# Patient Record
Sex: MALE | Race: WHITE | Employment: OTHER | URBAN - METROPOLITAN AREA
[De-identification: names, ages, dates, MRNs, and addresses within clinical notes are randomized per-mention and may not be internally consistent; named-entity substitution may affect disease eponyms.]

---

## 2017-04-07 ENCOUNTER — TELEPHONE (OUTPATIENT)
Dept: CARDIOLOGY CLINIC | Age: 82
End: 2017-04-07

## 2017-08-25 ENCOUNTER — HOSPITAL ENCOUNTER (OUTPATIENT)
Dept: MRI IMAGING | Age: 82
Discharge: HOME | End: 2017-08-25
Attending: FAMILY MEDICINE | Admitting: FAMILY MEDICINE

## 2017-08-25 DIAGNOSIS — R41.3 MEMORY LOSS: ICD-10-CM

## 2017-08-25 LAB
CREAT SERPL-MCNC: 1 MG/DL (ref 0.8–1.3)
GFR AFRICAN AMERICAN: >60
GFR NON-AFRICAN AMERICAN: >60

## 2017-09-19 ENCOUNTER — HOSPITAL ENCOUNTER (OUTPATIENT)
Dept: MRI IMAGING | Age: 82
Discharge: HOME | End: 2017-09-19
Attending: FAMILY MEDICINE | Admitting: FAMILY MEDICINE

## 2017-09-19 DIAGNOSIS — R41.3 OTHER AMNESIA: ICD-10-CM

## 2017-09-19 DIAGNOSIS — R41.3 MEMORY LOSS: ICD-10-CM

## 2017-09-22 ENCOUNTER — OFFICE VISIT (OUTPATIENT)
Dept: NEUROLOGY | Age: 82
End: 2017-09-22

## 2017-09-22 VITALS
DIASTOLIC BLOOD PRESSURE: 78 MMHG | WEIGHT: 234 LBS | HEIGHT: 74 IN | BODY MASS INDEX: 30.03 KG/M2 | HEART RATE: 58 BPM | SYSTOLIC BLOOD PRESSURE: 124 MMHG

## 2017-09-22 DIAGNOSIS — G30.1 LATE ONSET ALZHEIMER'S DISEASE WITHOUT BEHAVIORAL DISTURBANCE (HCC): Primary | ICD-10-CM

## 2017-09-22 DIAGNOSIS — F02.80 LATE ONSET ALZHEIMER'S DISEASE WITHOUT BEHAVIORAL DISTURBANCE (HCC): Primary | ICD-10-CM

## 2017-09-22 PROCEDURE — 99205 OFFICE O/P NEW HI 60 MIN: CPT | Performed by: PSYCHIATRY & NEUROLOGY

## 2017-09-22 RX ORDER — MEMANTINE HYDROCHLORIDE 5 MG/1
TABLET ORAL
Qty: 60 TABLET | Refills: 1 | Status: SHIPPED | OUTPATIENT
Start: 2017-09-22 | End: 2021-06-17

## 2018-03-18 PROBLEM — N17.9 ACUTE RENAL FAILURE (ARF) (HCC): Status: ACTIVE | Noted: 2018-03-18

## 2018-04-04 ENCOUNTER — HOSPITAL ENCOUNTER (OUTPATIENT)
Dept: SURGERY | Age: 83
Discharge: HOME | End: 2018-04-04
Attending: FAMILY MEDICINE | Admitting: FAMILY MEDICINE

## 2018-04-04 VITALS
RESPIRATION RATE: 20 BRPM | HEART RATE: 66 BPM | WEIGHT: 233.25 LBS | TEMPERATURE: 97.4 F | BODY MASS INDEX: 29.95 KG/M2 | OXYGEN SATURATION: 94 % | SYSTOLIC BLOOD PRESSURE: 152 MMHG | DIASTOLIC BLOOD PRESSURE: 85 MMHG

## 2018-04-04 LAB
ANION GAP SERPL CALCULATED.3IONS-SCNC: 10 MMOL/L (ref 3–16)
BUN BLDV-MCNC: 20 MG/DL (ref 7–20)
CALCIUM SERPL-MCNC: 9.4 MG/DL (ref 8.3–10.6)
CHLORIDE BLD-SCNC: 105 MMOL/L (ref 99–110)
CO2: 27 MMOL/L (ref 21–32)
CREAT SERPL-MCNC: 1.1 MG/DL (ref 0.8–1.3)
GFR AFRICAN AMERICAN: >60
GFR NON-AFRICAN AMERICAN: >60
GLUCOSE BLD-MCNC: 111 MG/DL (ref 70–99)
POTASSIUM SERPL-SCNC: 3.6 MMOL/L (ref 3.5–5.1)
SODIUM BLD-SCNC: 142 MMOL/L (ref 136–145)

## 2018-04-04 PROCEDURE — 93010 ELECTROCARDIOGRAM REPORT: CPT | Performed by: INTERNAL MEDICINE

## 2018-04-04 RX ORDER — DIPHENHYDRAMINE HYDROCHLORIDE 50 MG/ML
12.5 INJECTION INTRAMUSCULAR; INTRAVENOUS
Status: ACTIVE | OUTPATIENT
Start: 2018-04-04 | End: 2018-04-04

## 2018-04-04 RX ORDER — SODIUM CHLORIDE 0.9 % (FLUSH) 0.9 %
10 SYRINGE (ML) INJECTION EVERY 12 HOURS SCHEDULED
Status: CANCELLED | OUTPATIENT
Start: 2018-04-04

## 2018-04-04 RX ORDER — LIDOCAINE HYDROCHLORIDE 10 MG/ML
0.5 INJECTION, SOLUTION EPIDURAL; INFILTRATION; INTRACAUDAL; PERINEURAL ONCE
Status: DISCONTINUED | OUTPATIENT
Start: 2018-04-04 | End: 2018-04-05 | Stop reason: HOSPADM

## 2018-04-04 RX ORDER — SODIUM CHLORIDE 9 MG/ML
INJECTION, SOLUTION INTRAVENOUS CONTINUOUS
Status: CANCELLED | OUTPATIENT
Start: 2018-04-04

## 2018-04-04 RX ORDER — FENTANYL CITRATE 50 UG/ML
25 INJECTION, SOLUTION INTRAMUSCULAR; INTRAVENOUS EVERY 5 MIN PRN
Status: DISCONTINUED | OUTPATIENT
Start: 2018-04-04 | End: 2018-04-05 | Stop reason: HOSPADM

## 2018-04-04 RX ORDER — SODIUM CHLORIDE 0.9 % (FLUSH) 0.9 %
10 SYRINGE (ML) INJECTION PRN
Status: CANCELLED | OUTPATIENT
Start: 2018-04-04

## 2018-04-04 RX ORDER — CEFAZOLIN SODIUM 2 G/100ML
2 INJECTION, SOLUTION INTRAVENOUS ONCE
Status: COMPLETED | OUTPATIENT
Start: 2018-04-04 | End: 2018-04-04

## 2018-04-04 RX ORDER — LIDOCAINE HYDROCHLORIDE 10 MG/ML
1 INJECTION, SOLUTION EPIDURAL; INFILTRATION; INTRACAUDAL; PERINEURAL
Status: CANCELLED | OUTPATIENT
Start: 2018-04-04 | End: 2018-04-04

## 2018-04-04 RX ORDER — FENTANYL CITRATE 50 UG/ML
50 INJECTION, SOLUTION INTRAMUSCULAR; INTRAVENOUS EVERY 5 MIN PRN
Status: DISCONTINUED | OUTPATIENT
Start: 2018-04-04 | End: 2018-04-05 | Stop reason: HOSPADM

## 2018-04-04 RX ORDER — SODIUM CHLORIDE, SODIUM LACTATE, POTASSIUM CHLORIDE, CALCIUM CHLORIDE 600; 310; 30; 20 MG/100ML; MG/100ML; MG/100ML; MG/100ML
INJECTION, SOLUTION INTRAVENOUS CONTINUOUS
Status: DISCONTINUED | OUTPATIENT
Start: 2018-04-04 | End: 2018-04-05 | Stop reason: HOSPADM

## 2018-04-04 RX ORDER — HYDROMORPHONE HCL 110MG/55ML
0.25 PATIENT CONTROLLED ANALGESIA SYRINGE INTRAVENOUS EVERY 5 MIN PRN
Status: DISCONTINUED | OUTPATIENT
Start: 2018-04-04 | End: 2018-04-05 | Stop reason: HOSPADM

## 2018-04-04 RX ORDER — HYDROMORPHONE HCL 110MG/55ML
0.5 PATIENT CONTROLLED ANALGESIA SYRINGE INTRAVENOUS EVERY 5 MIN PRN
Status: DISCONTINUED | OUTPATIENT
Start: 2018-04-04 | End: 2018-04-05 | Stop reason: HOSPADM

## 2018-04-04 RX ORDER — MEPERIDINE HYDROCHLORIDE 25 MG/ML
12.5 INJECTION INTRAMUSCULAR; INTRAVENOUS; SUBCUTANEOUS EVERY 5 MIN PRN
Status: DISCONTINUED | OUTPATIENT
Start: 2018-04-04 | End: 2018-04-05 | Stop reason: HOSPADM

## 2018-04-04 RX ORDER — HYDROCODONE BITARTRATE AND ACETAMINOPHEN 5; 325 MG/1; MG/1
2 TABLET ORAL PRN
Status: ACTIVE | OUTPATIENT
Start: 2018-04-04 | End: 2018-04-04

## 2018-04-04 RX ORDER — PROMETHAZINE HYDROCHLORIDE 25 MG/ML
6.25 INJECTION, SOLUTION INTRAMUSCULAR; INTRAVENOUS EVERY 30 MIN PRN
Status: DISCONTINUED | OUTPATIENT
Start: 2018-04-04 | End: 2018-04-05 | Stop reason: HOSPADM

## 2018-04-04 RX ORDER — HYDROCODONE BITARTRATE AND ACETAMINOPHEN 5; 325 MG/1; MG/1
1 TABLET ORAL PRN
Status: ACTIVE | OUTPATIENT
Start: 2018-04-04 | End: 2018-04-04

## 2018-04-04 RX ADMIN — CEFAZOLIN SODIUM 2 G: 2 INJECTION, SOLUTION INTRAVENOUS at 08:09

## 2018-04-04 RX ADMIN — SODIUM CHLORIDE, SODIUM LACTATE, POTASSIUM CHLORIDE, CALCIUM CHLORIDE: 600; 310; 30; 20 INJECTION, SOLUTION INTRAVENOUS at 07:57

## 2018-04-04 ASSESSMENT — PAIN SCALES - GENERAL
PAINLEVEL_OUTOF10: 0
PAINLEVEL_OUTOF10: 0

## 2018-04-04 ASSESSMENT — PAIN - FUNCTIONAL ASSESSMENT: PAIN_FUNCTIONAL_ASSESSMENT: 0-10

## 2018-04-05 LAB
EKG ATRIAL RATE: 66 BPM
EKG DIAGNOSIS: NORMAL
EKG P AXIS: 33 DEGREES
EKG P-R INTERVAL: 140 MS
EKG Q-T INTERVAL: 440 MS
EKG QRS DURATION: 80 MS
EKG QTC CALCULATION (BAZETT): 461 MS
EKG R AXIS: -15 DEGREES
EKG T AXIS: 270 DEGREES
EKG VENTRICULAR RATE: 66 BPM

## 2018-04-26 PROBLEM — N40.1 BENIGN PROSTATIC HYPERPLASIA (BPH) WITH POST-VOID DRIBBLING: Status: ACTIVE | Noted: 2018-04-26

## 2018-04-26 PROBLEM — N39.43 BENIGN PROSTATIC HYPERPLASIA (BPH) WITH POST-VOID DRIBBLING: Status: ACTIVE | Noted: 2018-04-26

## 2019-09-24 ENCOUNTER — HOSPITAL ENCOUNTER (EMERGENCY)
Age: 84
Discharge: HOME | End: 2019-09-24
Attending: EMERGENCY MEDICINE
Payer: MEDICARE

## 2019-09-24 ENCOUNTER — APPOINTMENT (OUTPATIENT)
Dept: GENERAL RADIOLOGY | Age: 84
End: 2019-09-24
Payer: MEDICARE

## 2019-09-24 VITALS
WEIGHT: 230 LBS | HEART RATE: 61 BPM | HEIGHT: 74 IN | TEMPERATURE: 97.4 F | OXYGEN SATURATION: 95 % | SYSTOLIC BLOOD PRESSURE: 154 MMHG | RESPIRATION RATE: 20 BRPM | DIASTOLIC BLOOD PRESSURE: 67 MMHG | BODY MASS INDEX: 29.52 KG/M2

## 2019-09-24 DIAGNOSIS — I95.9 HYPOTENSION, UNSPECIFIED HYPOTENSION TYPE: ICD-10-CM

## 2019-09-24 DIAGNOSIS — E86.0 DEHYDRATION: Primary | ICD-10-CM

## 2019-09-24 LAB
ALBUMIN SERPL-MCNC: 4 G/DL (ref 3.4–5)
ALP BLD-CCNC: 105 U/L (ref 40–129)
ALT SERPL-CCNC: 11 U/L (ref 10–40)
ANION GAP SERPL CALCULATED.3IONS-SCNC: 9 MMOL/L (ref 3–16)
AST SERPL-CCNC: 17 U/L (ref 15–37)
BASOPHILS ABSOLUTE: 0.1 K/UL (ref 0–0.2)
BASOPHILS RELATIVE PERCENT: 1.1 %
BILIRUB SERPL-MCNC: 0.6 MG/DL (ref 0–1)
BILIRUBIN DIRECT: <0.2 MG/DL (ref 0–0.3)
BILIRUBIN URINE: ABNORMAL
BILIRUBIN, INDIRECT: NORMAL MG/DL (ref 0–1)
BLOOD, URINE: NEGATIVE
BUN BLDV-MCNC: 17 MG/DL (ref 7–20)
CALCIUM SERPL-MCNC: 9.3 MG/DL (ref 8.3–10.6)
CHLORIDE BLD-SCNC: 108 MMOL/L (ref 99–110)
CLARITY: CLEAR
CO2: 25 MMOL/L (ref 21–32)
COLOR: YELLOW
CREAT SERPL-MCNC: 1.5 MG/DL (ref 0.8–1.3)
EOSINOPHILS ABSOLUTE: 0.4 K/UL (ref 0–0.6)
EOSINOPHILS RELATIVE PERCENT: 4.2 %
EPITHELIAL CELLS, UA: 1 /HPF (ref 0–5)
GFR AFRICAN AMERICAN: 54
GFR NON-AFRICAN AMERICAN: 45
GLUCOSE BLD-MCNC: 132 MG/DL (ref 70–99)
GLUCOSE URINE: NEGATIVE MG/DL
HCT VFR BLD CALC: 46.3 % (ref 40.5–52.5)
HEMOGLOBIN: 15.2 G/DL (ref 13.5–17.5)
HYALINE CASTS: 12 /LPF (ref 0–8)
KETONES, URINE: NEGATIVE MG/DL
LACTIC ACID: 1.2 MMOL/L (ref 0.4–2)
LEUKOCYTE ESTERASE, URINE: ABNORMAL
LYMPHOCYTES ABSOLUTE: 2.4 K/UL (ref 1–5.1)
LYMPHOCYTES RELATIVE PERCENT: 26.2 %
MCH RBC QN AUTO: 29.9 PG (ref 26–34)
MCHC RBC AUTO-ENTMCNC: 32.7 G/DL (ref 31–36)
MCV RBC AUTO: 91.4 FL (ref 80–100)
MICROSCOPIC EXAMINATION: YES
MONOCYTES ABSOLUTE: 0.9 K/UL (ref 0–1.3)
MONOCYTES RELATIVE PERCENT: 9.4 %
NEUTROPHILS ABSOLUTE: 5.4 K/UL (ref 1.7–7.7)
NEUTROPHILS RELATIVE PERCENT: 59.1 %
NITRITE, URINE: NEGATIVE
PDW BLD-RTO: 14.5 % (ref 12.4–15.4)
PH UA: 5.5 (ref 5–8)
PLATELET # BLD: 262 K/UL (ref 135–450)
PMV BLD AUTO: 8.7 FL (ref 5–10.5)
POTASSIUM SERPL-SCNC: 3.6 MMOL/L (ref 3.5–5.1)
PROTEIN UA: 30 MG/DL
RBC # BLD: 5.07 M/UL (ref 4.2–5.9)
RBC UA: 2 /HPF (ref 0–4)
SODIUM BLD-SCNC: 142 MMOL/L (ref 136–145)
SPECIFIC GRAVITY UA: 1.02 (ref 1–1.03)
TOTAL PROTEIN: 7 G/DL (ref 6.4–8.2)
TROPONIN: <0.01 NG/ML
URINE REFLEX TO CULTURE: YES
URINE TYPE: ABNORMAL
UROBILINOGEN, URINE: 0.2 E.U./DL
WBC # BLD: 9.1 K/UL (ref 4–11)
WBC UA: 6 /HPF (ref 0–5)

## 2019-09-24 PROCEDURE — 2580000003 HC RX 258: Performed by: EMERGENCY MEDICINE

## 2019-09-24 PROCEDURE — 87086 URINE CULTURE/COLONY COUNT: CPT

## 2019-09-24 PROCEDURE — 85025 COMPLETE CBC W/AUTO DIFF WBC: CPT

## 2019-09-24 PROCEDURE — 93005 ELECTROCARDIOGRAM TRACING: CPT | Performed by: EMERGENCY MEDICINE

## 2019-09-24 PROCEDURE — 96366 THER/PROPH/DIAG IV INF ADDON: CPT

## 2019-09-24 PROCEDURE — 96365 THER/PROPH/DIAG IV INF INIT: CPT

## 2019-09-24 PROCEDURE — 99285 EMERGENCY DEPT VISIT HI MDM: CPT

## 2019-09-24 PROCEDURE — 81001 URINALYSIS AUTO W/SCOPE: CPT

## 2019-09-24 PROCEDURE — 80076 HEPATIC FUNCTION PANEL: CPT

## 2019-09-24 PROCEDURE — 84484 ASSAY OF TROPONIN QUANT: CPT

## 2019-09-24 PROCEDURE — 83605 ASSAY OF LACTIC ACID: CPT

## 2019-09-24 PROCEDURE — 80048 BASIC METABOLIC PNL TOTAL CA: CPT

## 2019-09-24 PROCEDURE — 71046 X-RAY EXAM CHEST 2 VIEWS: CPT

## 2019-09-24 RX ORDER — OMEPRAZOLE 20 MG/1
20 CAPSULE, DELAYED RELEASE ORAL DAILY
COMMUNITY
End: 2021-06-17

## 2019-09-24 RX ORDER — TEMAZEPAM 15 MG/1
15 CAPSULE ORAL NIGHTLY PRN
COMMUNITY
End: 2021-06-17

## 2019-09-24 RX ORDER — SODIUM CHLORIDE, SODIUM LACTATE, POTASSIUM CHLORIDE, CALCIUM CHLORIDE 600; 310; 30; 20 MG/100ML; MG/100ML; MG/100ML; MG/100ML
1000 INJECTION, SOLUTION INTRAVENOUS ONCE
Status: COMPLETED | OUTPATIENT
Start: 2019-09-24 | End: 2019-09-24

## 2019-09-24 RX ORDER — FINASTERIDE 5 MG/1
5 TABLET, FILM COATED ORAL DAILY
COMMUNITY
End: 2021-06-17

## 2019-09-24 RX ADMIN — SODIUM CHLORIDE, POTASSIUM CHLORIDE, SODIUM LACTATE AND CALCIUM CHLORIDE 1000 ML: 600; 310; 30; 20 INJECTION, SOLUTION INTRAVENOUS at 17:44

## 2019-09-25 LAB
EKG ATRIAL RATE: 82 BPM
EKG DIAGNOSIS: NORMAL
EKG P AXIS: 14 DEGREES
EKG P-R INTERVAL: 128 MS
EKG Q-T INTERVAL: 404 MS
EKG QRS DURATION: 68 MS
EKG QTC CALCULATION (BAZETT): 472 MS
EKG R AXIS: -23 DEGREES
EKG T AXIS: -6 DEGREES
EKG VENTRICULAR RATE: 82 BPM

## 2019-09-25 PROCEDURE — 93010 ELECTROCARDIOGRAM REPORT: CPT | Performed by: INTERNAL MEDICINE

## 2019-09-26 LAB — URINE CULTURE, ROUTINE: NORMAL

## 2020-02-25 ENCOUNTER — OFFICE VISIT (OUTPATIENT)
Dept: ORTHOPEDIC SURGERY | Age: 85
End: 2020-02-25
Payer: MEDICARE

## 2020-02-25 VITALS
HEIGHT: 74 IN | HEART RATE: 81 BPM | WEIGHT: 230 LBS | BODY MASS INDEX: 29.52 KG/M2 | SYSTOLIC BLOOD PRESSURE: 103 MMHG | DIASTOLIC BLOOD PRESSURE: 66 MMHG

## 2020-02-25 PROCEDURE — G8427 DOCREV CUR MEDS BY ELIG CLIN: HCPCS | Performed by: ORTHOPAEDIC SURGERY

## 2020-02-25 PROCEDURE — G8484 FLU IMMUNIZE NO ADMIN: HCPCS | Performed by: ORTHOPAEDIC SURGERY

## 2020-02-25 PROCEDURE — 1123F ACP DISCUSS/DSCN MKR DOCD: CPT | Performed by: ORTHOPAEDIC SURGERY

## 2020-02-25 PROCEDURE — 99203 OFFICE O/P NEW LOW 30 MIN: CPT | Performed by: ORTHOPAEDIC SURGERY

## 2020-02-25 PROCEDURE — G8417 CALC BMI ABV UP PARAM F/U: HCPCS | Performed by: ORTHOPAEDIC SURGERY

## 2020-02-25 PROCEDURE — 1036F TOBACCO NON-USER: CPT | Performed by: ORTHOPAEDIC SURGERY

## 2020-02-25 PROCEDURE — 20610 DRAIN/INJ JOINT/BURSA W/O US: CPT | Performed by: ORTHOPAEDIC SURGERY

## 2020-02-25 PROCEDURE — 4040F PNEUMOC VAC/ADMIN/RCVD: CPT | Performed by: ORTHOPAEDIC SURGERY

## 2020-02-25 RX ORDER — LIDOCAINE HYDROCHLORIDE 10 MG/ML
8 INJECTION, SOLUTION INFILTRATION; PERINEURAL ONCE
Status: COMPLETED | OUTPATIENT
Start: 2020-02-25 | End: 2020-02-25

## 2020-02-25 RX ORDER — METHYLPREDNISOLONE ACETATE 40 MG/ML
80 INJECTION, SUSPENSION INTRA-ARTICULAR; INTRALESIONAL; INTRAMUSCULAR; SOFT TISSUE ONCE
Status: COMPLETED | OUTPATIENT
Start: 2020-02-25 | End: 2020-02-25

## 2020-02-25 RX ADMIN — METHYLPREDNISOLONE ACETATE 80 MG: 40 INJECTION, SUSPENSION INTRA-ARTICULAR; INTRALESIONAL; INTRAMUSCULAR; SOFT TISSUE at 14:41

## 2020-02-25 RX ADMIN — LIDOCAINE HYDROCHLORIDE 8 ML: 10 INJECTION, SOLUTION INFILTRATION; PERINEURAL at 14:41

## 2020-02-25 NOTE — PROGRESS NOTES
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 According to his daughter who is present, he has had multiple falls during that time, the last one approximately 4 weeks ago. However, does not remember one specific fall which caused shoulder pain. In addition he played both high school football, as well as  College football for [employer] and [employer] but again had no specific injury at that time. His pain is primarily in the left proximal humerus radiating to the left elbow. It is an intermittent ache with no pain at rest but pain up to 5 especially when reaching overhead. There is no associated numbness. He has had no previous treatment for the left shoulder. Pertinent items are noted in HPI  Review of systems reviewed from Patient History Form dated on 2/25/2020 and available in the patient's chart under the Media tab. The patient's past medical history, medications, allergies, family history, social history, HPI have been reviewed, dated, and recorded in the chart. Dr. Bob Childress were primarily in the [facility] region retiring when he was about 80years of age.     /66   Pulse 81   Ht 6' 2\" (1.88 m)   Wt 230 lb (104.3 kg)   BMI 29.53 kg/m²     PHYSICAL EXAMINATION    General Appearance: no acute distress, alert, oriented x 3, appropriate mood and affect    Atrophy: No  Ecchymosis: No   Errythemia: No  Swelling: No   Deformity: No  Scapular Winging: No  Palpation/Tenderness: no  Crepitus:   Neurovascular Status: intact    Pulses (0-4)   Radial    Ulnar   Right     Left 2+      Range of Motion: Degrees   Abduction External Internal Passive Abd   Right 140 80 60 Left 115 40 10       SpecialTest:   Impingement Ridge Fish Crossover Sign Speed Juliatarun Subacromial  Injection test SLAP T    DLST   Right    neg      Left    neg        Strength Rotator Cuff:           Normal=N    Weak=W     Antalgic=A    Supraspinatus  Subscapularis  Infraspinatus  Teres Minor    Right N N N N    Left Mild W N Mild W N       X-Ray Findings taken in Office: 3 views left shoulder read by myself show marked loss of left shoulder glenohumeral space with a large osteophyte off the inferior aspect of the humeral head. Humeral acromial space is normal at approximately 12 mm. There are cystic changes at the greater tuberosity. He has moderate arthritic changes at the acromioclavicular joint. No evidence of fracture or lytic disease. No evidence of dislocation. Impression:   1. Severe glenohumeral osteoarthritis of left shoulder. 2.  Probable tear of both supraspinatus and infraspinatus tendon. (Rotator cuff arthropathy)    Plan/Treatment:   1. Injection with cortisone was recommended into  Left shoulder. After discussing potential risks and benefits, the injection site was cleaned with alcohol, and 2 cc of 40 mg Depo Medrol combined with 8 cc of 1% Lidoocaine were injected into the subdeltoid bursa. 2.  He will start in-home physical therapy for strengthening and range of motion of his left shoulder. 3.  I did discuss prognosis. Because of his arthritis he would likely not regain his full motion. 4.  Further treatment and diagnostic options were discussed with the patient. If he is not satisfied with his progress he would consider returning to the office for a diagnostic ultrasound of the shoulder. Katelynn Portillo MD  2/25/2020    This dictation was done with Dragon dictation and may contain mechanical errors related to translation.

## 2021-06-17 ENCOUNTER — APPOINTMENT (OUTPATIENT)
Dept: GENERAL RADIOLOGY | Age: 86
End: 2021-06-17
Payer: MEDICARE

## 2021-06-17 ENCOUNTER — APPOINTMENT (OUTPATIENT)
Dept: MRI IMAGING | Age: 86
End: 2021-06-17
Payer: MEDICARE

## 2021-06-17 ENCOUNTER — HOSPITAL ENCOUNTER (OUTPATIENT)
Age: 86
Setting detail: OBSERVATION
Discharge: HOME | End: 2021-06-18
Attending: EMERGENCY MEDICINE | Admitting: EMERGENCY MEDICINE
Payer: MEDICARE

## 2021-06-17 ENCOUNTER — APPOINTMENT (OUTPATIENT)
Dept: CT IMAGING | Age: 86
End: 2021-06-17
Payer: MEDICARE

## 2021-06-17 DIAGNOSIS — R55 NEAR SYNCOPE: Primary | ICD-10-CM

## 2021-06-17 DIAGNOSIS — I10 ESSENTIAL HYPERTENSION: ICD-10-CM

## 2021-06-17 LAB
A/G RATIO: 1.2 (ref 1.1–2.2)
ALBUMIN SERPL-MCNC: 3.8 G/DL (ref 3.4–5)
ALP BLD-CCNC: 90 U/L (ref 40–129)
ALT SERPL-CCNC: 7 U/L (ref 10–40)
ANION GAP SERPL CALCULATED.3IONS-SCNC: 9 MMOL/L (ref 3–16)
AST SERPL-CCNC: 18 U/L (ref 15–37)
BASOPHILS ABSOLUTE: 0.1 K/UL (ref 0–0.2)
BASOPHILS RELATIVE PERCENT: 0.6 %
BILIRUB SERPL-MCNC: 0.4 MG/DL (ref 0–1)
BILIRUBIN URINE: NEGATIVE
BLOOD, URINE: NEGATIVE
BUN BLDV-MCNC: 19 MG/DL (ref 7–20)
CALCIUM SERPL-MCNC: 9.2 MG/DL (ref 8.3–10.6)
CHLORIDE BLD-SCNC: 104 MMOL/L (ref 99–110)
CLARITY: ABNORMAL
CO2: 26 MMOL/L (ref 21–32)
COLOR: YELLOW
CREAT SERPL-MCNC: 1.2 MG/DL (ref 0.8–1.3)
CRYSTALS, UA: ABNORMAL /HPF
EKG ATRIAL RATE: 82 BPM
EKG DIAGNOSIS: NORMAL
EKG P AXIS: 25 DEGREES
EKG P-R INTERVAL: 134 MS
EKG Q-T INTERVAL: 410 MS
EKG QRS DURATION: 68 MS
EKG QTC CALCULATION (BAZETT): 479 MS
EKG R AXIS: -23 DEGREES
EKG T AXIS: -17 DEGREES
EKG VENTRICULAR RATE: 82 BPM
EOSINOPHILS ABSOLUTE: 0.6 K/UL (ref 0–0.6)
EOSINOPHILS RELATIVE PERCENT: 6 %
EPITHELIAL CELLS, UA: 1 /HPF (ref 0–5)
GFR AFRICAN AMERICAN: >60
GFR NON-AFRICAN AMERICAN: 58
GLOBULIN: 3.1 G/DL
GLUCOSE BLD-MCNC: 118 MG/DL (ref 70–99)
GLUCOSE URINE: NEGATIVE MG/DL
HCT VFR BLD CALC: 44.8 % (ref 40.5–52.5)
HEMOGLOBIN: 15.1 G/DL (ref 13.5–17.5)
HYALINE CASTS: 1 /LPF (ref 0–8)
KETONES, URINE: NEGATIVE MG/DL
LEUKOCYTE ESTERASE, URINE: NEGATIVE
LYMPHOCYTES ABSOLUTE: 2.3 K/UL (ref 1–5.1)
LYMPHOCYTES RELATIVE PERCENT: 23.6 %
MCH RBC QN AUTO: 29.3 PG (ref 26–34)
MCHC RBC AUTO-ENTMCNC: 33.6 G/DL (ref 31–36)
MCV RBC AUTO: 87.3 FL (ref 80–100)
MICROSCOPIC EXAMINATION: YES
MONOCYTES ABSOLUTE: 1.2 K/UL (ref 0–1.3)
MONOCYTES RELATIVE PERCENT: 11.9 %
NEUTROPHILS ABSOLUTE: 5.7 K/UL (ref 1.7–7.7)
NEUTROPHILS RELATIVE PERCENT: 57.9 %
NITRITE, URINE: NEGATIVE
PDW BLD-RTO: 14.1 % (ref 12.4–15.4)
PH UA: 6 (ref 5–8)
PLATELET # BLD: 274 K/UL (ref 135–450)
PMV BLD AUTO: 7.9 FL (ref 5–10.5)
POTASSIUM SERPL-SCNC: 3.6 MMOL/L (ref 3.5–5.1)
PRO-BNP: 1012 PG/ML (ref 0–449)
PROTEIN UA: NEGATIVE MG/DL
RBC # BLD: 5.13 M/UL (ref 4.2–5.9)
RBC UA: 1 /HPF (ref 0–4)
SODIUM BLD-SCNC: 139 MMOL/L (ref 136–145)
SPECIFIC GRAVITY UA: 1.02 (ref 1–1.03)
TOTAL PROTEIN: 6.9 G/DL (ref 6.4–8.2)
TROPONIN: <0.01 NG/ML
URINE REFLEX TO CULTURE: ABNORMAL
URINE TYPE: ABNORMAL
UROBILINOGEN, URINE: 1 E.U./DL
WBC # BLD: 9.8 K/UL (ref 4–11)
WBC UA: 2 /HPF (ref 0–5)

## 2021-06-17 PROCEDURE — 71045 X-RAY EXAM CHEST 1 VIEW: CPT

## 2021-06-17 PROCEDURE — G0378 HOSPITAL OBSERVATION PER HR: HCPCS

## 2021-06-17 PROCEDURE — 94760 N-INVAS EAR/PLS OXIMETRY 1: CPT

## 2021-06-17 PROCEDURE — 96372 THER/PROPH/DIAG INJ SC/IM: CPT

## 2021-06-17 PROCEDURE — 70450 CT HEAD/BRAIN W/O DYE: CPT

## 2021-06-17 PROCEDURE — 2580000003 HC RX 258: Performed by: PHYSICIAN ASSISTANT

## 2021-06-17 PROCEDURE — 6360000002 HC RX W HCPCS: Performed by: HOSPITALIST

## 2021-06-17 PROCEDURE — 93010 ELECTROCARDIOGRAM REPORT: CPT | Performed by: INTERNAL MEDICINE

## 2021-06-17 PROCEDURE — 93005 ELECTROCARDIOGRAM TRACING: CPT | Performed by: EMERGENCY MEDICINE

## 2021-06-17 PROCEDURE — 85025 COMPLETE CBC W/AUTO DIFF WBC: CPT

## 2021-06-17 PROCEDURE — 99284 EMERGENCY DEPT VISIT MOD MDM: CPT

## 2021-06-17 PROCEDURE — 36415 COLL VENOUS BLD VENIPUNCTURE: CPT

## 2021-06-17 PROCEDURE — 96361 HYDRATE IV INFUSION ADD-ON: CPT

## 2021-06-17 PROCEDURE — 6370000000 HC RX 637 (ALT 250 FOR IP): Performed by: HOSPITALIST

## 2021-06-17 PROCEDURE — 2580000003 HC RX 258: Performed by: HOSPITALIST

## 2021-06-17 PROCEDURE — 81001 URINALYSIS AUTO W/SCOPE: CPT

## 2021-06-17 PROCEDURE — 96360 HYDRATION IV INFUSION INIT: CPT

## 2021-06-17 PROCEDURE — 80053 COMPREHEN METABOLIC PANEL: CPT

## 2021-06-17 PROCEDURE — 84484 ASSAY OF TROPONIN QUANT: CPT

## 2021-06-17 PROCEDURE — 83880 ASSAY OF NATRIURETIC PEPTIDE: CPT

## 2021-06-17 RX ORDER — POLYETHYLENE GLYCOL 3350 17 G/17G
17 POWDER, FOR SOLUTION ORAL DAILY PRN
Status: DISCONTINUED | OUTPATIENT
Start: 2021-06-17 | End: 2021-06-18 | Stop reason: HOSPADM

## 2021-06-17 RX ORDER — SODIUM CHLORIDE 0.9 % (FLUSH) 0.9 %
5-40 SYRINGE (ML) INJECTION EVERY 12 HOURS SCHEDULED
Status: DISCONTINUED | OUTPATIENT
Start: 2021-06-17 | End: 2021-06-18 | Stop reason: HOSPADM

## 2021-06-17 RX ORDER — ONDANSETRON 4 MG/1
4 TABLET, ORALLY DISINTEGRATING ORAL EVERY 8 HOURS PRN
Status: DISCONTINUED | OUTPATIENT
Start: 2021-06-17 | End: 2021-06-18 | Stop reason: HOSPADM

## 2021-06-17 RX ORDER — HYDRALAZINE HYDROCHLORIDE 20 MG/ML
10 INJECTION INTRAMUSCULAR; INTRAVENOUS EVERY 6 HOURS PRN
Status: DISCONTINUED | OUTPATIENT
Start: 2021-06-17 | End: 2021-06-18 | Stop reason: HOSPADM

## 2021-06-17 RX ORDER — ATORVASTATIN CALCIUM 40 MG/1
40 TABLET, FILM COATED ORAL DAILY
Status: DISCONTINUED | OUTPATIENT
Start: 2021-06-17 | End: 2021-06-18 | Stop reason: HOSPADM

## 2021-06-17 RX ORDER — ACETAMINOPHEN 650 MG/1
650 SUPPOSITORY RECTAL EVERY 6 HOURS PRN
Status: DISCONTINUED | OUTPATIENT
Start: 2021-06-17 | End: 2021-06-18 | Stop reason: HOSPADM

## 2021-06-17 RX ORDER — SODIUM CHLORIDE 0.9 % (FLUSH) 0.9 %
5-40 SYRINGE (ML) INJECTION PRN
Status: DISCONTINUED | OUTPATIENT
Start: 2021-06-17 | End: 2021-06-18 | Stop reason: HOSPADM

## 2021-06-17 RX ORDER — CLONIDINE HYDROCHLORIDE 0.1 MG/1
0.2 TABLET ORAL ONCE
Status: COMPLETED | OUTPATIENT
Start: 2021-06-17 | End: 2021-06-17

## 2021-06-17 RX ORDER — SODIUM CHLORIDE 9 MG/ML
25 INJECTION, SOLUTION INTRAVENOUS PRN
Status: DISCONTINUED | OUTPATIENT
Start: 2021-06-17 | End: 2021-06-18 | Stop reason: HOSPADM

## 2021-06-17 RX ORDER — ACETAMINOPHEN 325 MG/1
650 TABLET ORAL EVERY 6 HOURS PRN
Status: DISCONTINUED | OUTPATIENT
Start: 2021-06-17 | End: 2021-06-18 | Stop reason: HOSPADM

## 2021-06-17 RX ORDER — ONDANSETRON 2 MG/ML
4 INJECTION INTRAMUSCULAR; INTRAVENOUS EVERY 6 HOURS PRN
Status: DISCONTINUED | OUTPATIENT
Start: 2021-06-17 | End: 2021-06-18 | Stop reason: HOSPADM

## 2021-06-17 RX ORDER — 0.9 % SODIUM CHLORIDE 0.9 %
1000 INTRAVENOUS SOLUTION INTRAVENOUS ONCE
Status: COMPLETED | OUTPATIENT
Start: 2021-06-17 | End: 2021-06-17

## 2021-06-17 RX ADMIN — Medication 10 ML: at 20:56

## 2021-06-17 RX ADMIN — ENOXAPARIN SODIUM 40 MG: 40 INJECTION SUBCUTANEOUS at 20:56

## 2021-06-17 RX ADMIN — ATORVASTATIN CALCIUM 40 MG: 40 TABLET, FILM COATED ORAL at 20:56

## 2021-06-17 RX ADMIN — CLONIDINE HYDROCHLORIDE 0.2 MG: 0.1 TABLET ORAL at 18:39

## 2021-06-17 RX ADMIN — SODIUM CHLORIDE 1000 ML: 9 INJECTION, SOLUTION INTRAVENOUS at 13:59

## 2021-06-17 ASSESSMENT — PAIN SCALES - GENERAL: PAINLEVEL_OUTOF10: 0

## 2021-06-17 ASSESSMENT — ENCOUNTER SYMPTOMS
VOMITING: 0
SHORTNESS OF BREATH: 0
NAUSEA: 0
COUGH: 0
RHINORRHEA: 0
ABDOMINAL PAIN: 0
DIARRHEA: 0

## 2021-06-17 NOTE — ED PROVIDER NOTES
[facility]        Pt Name: Cuba Renner  MRN: [id number]  [place] 1935  Date of evaluation: 6/17/2021  Provider: Parth Saab PA-C  PCP: Veronica Esposito. Henry Bolanos DO  Note Started: 4:05 PM EDT        I have seen and evaluated this patient with my supervising physician Lakhwinder Singleton MD.    [address] [place] [address]       Chief Complaint   Patient presents with    Loss of Consciousness       HISTORY OF PRESENT ILLNESS   (Location, Timing/Onset, Context/Setting, Quality, Duration, Modifying Factors, Severity, Associated Signs and Symptoms)  Note limiting factors. Cuba Renner is a 81 y.o. male who presents to the emergency department today for eval patient for a syncopal episode. The patient has a history or short-term memory loss, history of hypertension, hyperlipidemia. The patient arrives to the ED he states that he is asymptomatic, although he is unsure exactly why he is here. Per EMS report the patient had 2 episodes of syncope. With the first episode of syncope the patient was sitting down, EMS was called, he came to, EMS left, and then came back again with a syncopal episode. There are no reports of any fall, head injury. No seizure-like activity. The patient arrives to the ED he states he is asymptomatic. He has no headaches. No visual changes. No numbness tingling or weakness. He has no chest pain, shortness of breath. No abdominal pain. No nausea, vomiting or diarrhea. No recent illnesses, no other complaints. Nursing Notes were all reviewed and agreed with or any disagreements were addressed in the HPI. REVIEW OF SYSTEMS    (2-9 systems for level 4, 10 or more for level 5)     Review of Systems   Constitutional: Negative for activity change, appetite change, chills and fever. HENT: Negative for congestion and rhinorrhea. Respiratory: Negative for cough and shortness of breath.     Cardiovascular: Negative for chest pain. Gastrointestinal: Negative for abdominal pain, diarrhea, nausea and vomiting. Genitourinary: Negative for difficulty urinating, dysuria and hematuria. Neurological: Positive for syncope and light-headedness. Negative for weakness and numbness. Positives and Pertinent negatives as per HPI. Except as noted above in the ROS, all other systems were reviewed and negative. PAST MEDICAL HISTORY     Past Medical History:   Diagnosis Date    Dementia ([address]     HTN (hypertension)     Hyperlipidemia     PONV (postoperative nausea and vomiting)          SURGICAL HISTORY     Past Surgical History:   Procedure Laterality Date    COLONOSCOPY      CYSTOSCOPY  04/04/2018    FLEXIBLE CYSTOSCOPY    EYE SURGERY Right 11/2014    Cataract removal    KNEE ARTHROSCOPY      TURP  04/26/2018         CURRENTMEDICATIONS       Previous Medications    ATORVASTATIN (LIPITOR) 20 MG TABLET    Take 1 tablet by mouth daily. ALLERGIES     Patient has no known allergies. FAMILYHISTORY       Family History   Problem Relation Age of Onset    Heart Disease Neg Hx     High Blood Pressure Neg Hx     High Cholesterol Neg Hx           SOCIAL HISTORY       Social History     Tobacco Use    Smoking status: Never Smoker    Smokeless tobacco: Never Used   Vaping Use    Vaping Use: Never used   Substance Use Topics    Alcohol use: Yes     Comment: occasional glass of wine    Drug use: No       SCREENINGS   NIH Stroke Scale  NIH Stroke Scale Assessed: Yes  Interval: Baseline  Level of Consciousness (1a. ): Alert  LOC Questions (1b. ):  Answers both correctly  LOC Commands (1c. ): Performs both tasks correctly  Best Gaze (2. ): Normal  Visual (3. ): No visual loss  Facial Palsy (4. ): Normal symmetrical movement  Motor Arm, Left (5a. ): No drift  Motor Arm, Right (5b. ): No drift  Motor Leg, Left (6a. ): No drift  Motor Leg, Right (6b. ): No drift  Limb Ataxia (7. ): Absent  Sensory (8. ): Normal  Best Language (9. ): No aphasia  Dysarthria (10. ): Normal  Extinction and Inattention (11): No abnormality  Total: 0         PHYSICAL EXAM    (up to 7 for level 4, 8 or more for level 5)     ED Triage Vitals   BP Temp Temp Source Pulse Resp SpO2 Height Weight   06/17/21 1345 06/17/21 1352 06/17/21 1352 06/17/21 1345 06/17/21 1345 06/17/21 1345 -- --   (!) 166/90 98.1 °F (36.7 °C) Oral 81 16 98 %         Physical Exam  Vitals and nursing note reviewed. Constitutional:       Appearance: He is well-developed. He is not diaphoretic. HENT:      Head: Normocephalic and atraumatic. Right Ear: External ear normal.      Left Ear: External ear normal.      Nose: Nose normal.   Eyes:      General:         Right eye: No discharge. Left eye: No discharge. Neck:      Trachea: No tracheal deviation. Cardiovascular:      Rate and Rhythm: Normal rate and regular rhythm. Pulmonary:      Effort: Pulmonary effort is normal. No respiratory distress. Breath sounds: Normal breath sounds. No wheezing or rales. Abdominal:      General: Bowel sounds are normal. There is no distension. Palpations: Abdomen is soft. Tenderness: There is no abdominal tenderness. There is no guarding. Musculoskeletal:         General: Normal range of motion. Cervical back: Normal range of motion and neck supple. Skin:     General: Skin is warm and dry. Neurological:      General: No focal deficit present. Mental Status: He is alert. Mental status is at baseline. GCS: GCS eye subscore is 4. GCS verbal subscore is 5. GCS motor subscore is 6. Cranial Nerves: Cranial nerves are intact. Sensory: Sensation is intact. Motor: Motor function is intact.    Psychiatric:         Behavior: Behavior normal.         DIAGNOSTIC RESULTS   LABS:    Labs Reviewed   COMPREHENSIVE METABOLIC PANEL - Abnormal; Notable for the following components:       Result Value    Glucose 118 (*)     GFR Non-African American 58 (*)     ALT 7 (*)     All other components within normal limits    Narrative:     Performed at:  [facility]  [address]  [place], [address]   Phone 21  - Abnormal; Notable for the following components:    Pro-BNP 1,012 (*)     All other components within normal limits    Narrative:     Performed at:  [facility]  [email or web address],  [place], [address]   Phone [phone]   CBC WITH AUTO DIFFERENTIAL    Narrative:     Performed at:  [facility]  [email or web address],  [place], [address]   Phone [phone]   TROPONIN    Narrative:     Performed at:  [facility]  [email or web address]  [place], [address]   Phone [phone]   URINE RT REFLEX TO CULTURE       All other labs were within normal range or not returned as of this dictation. EKG: All EKG's are interpreted by the Emergency Department Physician in the absence of a cardiologist.  Please see their note for interpretation of EKG. RADIOLOGY:   Non-plain film images such as CT, Ultrasound and MRI are read by the radiologist. Plain radiographic images are visualized and preliminarily interpreted by the ED Provider with the below findings:        Interpretation per the Radiologist below, if available at the time of this note:    CT HEAD WO CONTRAST   Final Result   No acute intracranial abnormality. XR CHEST PORTABLE   Final Result   No radiographic evidence of acute pulmonary disease. CT HEAD WO CONTRAST    Result Date: 6/17/2021  EXAMINATION: CT OF THE HEAD WITHOUT CONTRAST  6/17/2021 2:13 pm TECHNIQUE: CT of the head was performed without the administration of intravenous contrast. Dose modulation, iterative reconstruction, and/or weight based adjustment of the mA/kV was utilized to reduce the radiation dose to as low as reasonably achievable. COMPARISON: None. HISTORY: ORDERING SYSTEM PROVIDED HISTORY: syncope TECHNOLOGIST PROVIDED HISTORY: Reason for exam:->syncope Has a \"code stroke\" or \"stroke alert\" been called? ->No Decision Support Exception - unselect if not a suspected or confirmed emergency medical condition->Emergency Medical Condition (MA) Reason for Exam: syncope Acuity: Unknown Type of Exam: Unknown FINDINGS: BRAIN/VENTRICLES: There is no acute intracranial hemorrhage, mass effect or midline shift. No abnormal extra-axial fluid collection. The gray-white differentiation is maintained without evidence of an acute infarct. There is no evidence of hydrocephalus. There is moderate atrophy. There are chronic microvascular ischemic changes. ORBITS: The visualized portion of the orbits demonstrate no acute abnormality. SINUSES: There is mucosal thickening of the right maxillary spinous, bilateral ethmoid air cells and layering secretion in the right sphenoid sinus. The bilateral mastoid air cells demonstrate no acute abnormality. SOFT TISSUES/SKULL:  No acute abnormality of the visualized skull or soft tissues. No acute intracranial abnormality. XR CHEST PORTABLE    Result Date: 6/17/2021  EXAMINATION: ONE XRAY VIEW OF THE CHEST 6/17/2021 2:01 pm COMPARISON: Chest x-ray dated 09/24/2019 HISTORY: ORDERING SYSTEM PROVIDED HISTORY: SOB TECHNOLOGIST PROVIDED HISTORY: Reason for exam:->SOB Reason for Exam: Loss of Consciousness Acuity: Acute Type of Exam: Initial FINDINGS: HEART/MEDIASTINUM: The cardiomediastinal silhouette is within normal limits. PLEURA/LUNGS: There are no focal consolidations or pleural effusions. There is no appreciable pneumothorax. BONES/SOFT TISSUE: No acute abnormality. No radiographic evidence of acute pulmonary disease.            PROCEDURES   Unless otherwise noted below, none     Procedures    CRITICAL CARE TIME   N/A    CONSULTS:  None      EMERGENCY DEPARTMENT COURSE and DIFFERENTIAL DIAGNOSIS/MDM:   Vitals:    Vitals: SA (K-DUR;KLOR-CON M) 10 MEQ TABLET    Take 1 tablet by mouth three times a week. TAMSULOSIN (FLOMAX) 0.4 MG CAPSULE    Take 0.4 mg by mouth daily    TEMAZEPAM (RESTORIL) 15 MG CAPSULE    Take 15 mg by mouth nightly as needed for Sleep. THERAPEUTIC MULTIVITAMIN-MINERALS (THERAGRAN-M) TABLET    Take 1 tablet by mouth daily.                 (Please note that portions of this note were completed with a voice recognition program.  Efforts were made to edit the dictations but occasionally words are mis-transcribed.)    Mary Steiner PA-C (electronically signed)            Mary Steiner PA-C  06/17/21 3640

## 2021-06-17 NOTE — ED PROVIDER NOTES
I independently performed a history and physical on Evelia Lau. All diagnostic, treatment, and disposition decisions were made by myself in conjunction with the advanced practice provider. I have participated in the medical decision making and directed the treatment plan and disposition of the patient. For further details of Inez Bo emergency department encounter, please see the advanced practice provider's documentation. CHIEF COMPLAINT  Chief Complaint   Patient presents with    Loss of Consciousness     Briefly, Evelia Lau is a 81 y.o. male  who presents to the ED complaining of a staring/unresponsive episode where he may have lost consciousness, EMS came and evaluated him and were leaving when it happened again (pt refused transport initially). He feels normal now. He denies headache. Felt lightheaded before the episodes happened. No chest pain/discomfort or SOB before or after. No focal numbness weakness or tingling. 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 He has a diagnosis of \"short term memory loss\" for 2 years per daughter but not actually diagnosed with dementia per family. Has been off his namenda because it wasn't working according to daughter. Not anticoagulated. FOCUSED PHYSICAL EXAMINATION  BP (!) 178/105   Pulse 77   Temp 98.1 °F (36.7 °C) (Oral)   Resp 24   SpO2 96%    Focused physical examination notable for no acute distress, well-appearing, well-nourished, normal speech and mentation without obvious facial droop, no obvious rash. No obvious cranial nerve deficits on my initial exam. Normal FNF testing bilat, no ataxia/dysmetria. No aphasia, no dysarthria, normal strength and sensation x4 extremities, neg vert skew deviation testing, EOMI without nystagmus. He knows it is 2021, at the hospital, and his family's name. Recall 0 out of 3 words at 5 minutes.     The 12 lead EKG was interpreted by me as follows:  Rate: normal with a rate of 82  Rhythm: sinus  Axis: normal  Intervals: normal [place], narrow QRS, normal QTc  ST segments: no ST elevations or depressions  T waves: no abnormal inversions  Non-specific T wave changes: present  Prior EKG comparison: EKG dated 9/24/19 is not significantly different    MDM:  Diagnostic considerations included seizure, stroke, TIA, intracranial bleed, trauma, vasovagal reaction, orthostatic reaction/dehydration, medication side effect, intoxication, metabolic/electrolyte disturbance, blood sugar disturbance, cardiac dysrhythmia    ED course was notable for 2 near syncopal events in quick succession. His work-up in the ED is actually fairly benign, nonspecific EKG changes, no margie dysrhythmia. Head CT is negative. He has an NIH stroke scale score of 0, no focal symptoms by history or exam, and symptoms are essentially resolved. Patient is working memory loss which is evident on my exam is not new per the daughter at the bedside. No obvious metabolic or infectious process on his initial work-up to explain his symptoms but based on his age and recurrent events within a few minutes of each other, admission would be warranted for further telemetry monitoring and to consider further neurologic or cardiac testing. During the patient's ED course, the patient was given:  Medications   0.9 % sodium chloride bolus (1,000 mLs Intravenous New Bag 6/17/21 1140)        CLINICAL IMPRESSION  1. Near syncope    2. Essential hypertension        DISPOSITION  Diana Davis was admitted in fair condition. The plan is to admit to the hospital at this time under the hospitalist service. Dr. Georgiana Kebede accepted the patient and will take over the patient's care. This chart was created using Dragon dictation software. Efforts were made by me to ensure accuracy, however some errors may be present due to limitations of this technology.             Latasha Rosen MD  06/17/21 2503

## 2021-06-17 NOTE — H&P
HOSPITALISTS HISTORY AND PHYSICAL    6/17/2021 3:51 PM    Patient Information:  Martha Sherman is a 81 y.o. male [id number]  PCP:  Staci Urban. Shauna Tapia DO (Tel: [phone] )    Chief complaint:    Chief Complaint   Patient presents with    Loss of Consciousness        History of Present Illness:  Lucas Mitchell is a 81 y.o. male who presented with syncopal episode. Patient probably has during unresponsive episode where he may have loss conscious apparently patient could not determine if truly lost consciousness. When EMS arrived patient had another episode where he was crying and staring. Patient was back to normal within minutes. Patient denies any focal deficit just felt lightheaded at the time. No chest pain no shortness of breath no weakness numbness or tingling. REVIEW OF SYSTEMS:   Constitutional: Negative for fever,chills or night sweats  ENT: Negative for rhinorrhea, epistaxis, hoarseness, sore throat. Respiratory: Negative for shortness of breath,wheezing  Cardiovascular: Negative for chest pain, palpitations   Gastrointestinal: Negative for nausea, vomiting, diarrhea  Genitourinary: Negative for polyuria, dysuria   Hematologic/Lymphatic: Negative for bleeding tendency, easy bruising  Musculoskeletal: Negative for myalgias and arthralgias  Neurologic: Negative for confusion,dysarthria. Skin: Negative for itching,rash, good capillary refill. Psychiatric: Negative for depression,anxiety, agitation. Endocrine: Negative for polydipsia,polyuria,heat /cold intolerance. Past Medical History:   has a past medical history of Dementia ([address] HTN (hypertension), Hyperlipidemia, and PONV (postoperative nausea and vomiting). Past Surgical History:   has a past surgical history that includes Knee arthroscopy; Colonoscopy; eye surgery (Right, 11/2014); Cystoscopy (04/04/2018); and TURP (04/26/2018).      Medications:  No current facility-administered medications on file prior to encounter. Current Outpatient Medications on File Prior to Encounter   Medication Sig Dispense Refill    atorvastatin (LIPITOR) 20 MG tablet Take 1 tablet by mouth daily. (Patient taking differently: Take 40 mg by mouth daily ) 90 tablet 3       Allergies:  No Known Allergies     Social History:   reports that he has never smoked. He has never used smokeless tobacco. He reports current alcohol use. He reports that he does not use drugs. Family History:  Noncontributory to current symptoms. Physical Exam:  BP (!) 178/105   Pulse 77   Temp 98.1 °F (36.7 °C) (Oral)   Resp 24   SpO2 96%     General appearance:  Appears comfortable. Well nourished  Eyes: Sclera clear, pupils equal  ENT: Moist mucus membranes, no thrush. Trachea midline. Cardiovascular: Regular rhythm, normal S1, S2. No murmur, gallop, rub. No edema in lower extremities  Respiratory: Clear to auscultation bilaterally, no wheeze, good inspiratory effort  Gastrointestinal: Abdomen soft, non-tender, not distended, normal bowel sounds  Musculoskeletal: No cyanosis in digits, neck supple  Neurology: Cranial nerves grossly intact. Alert and oriented in time, place and person. No speech or motor deficits  Psychiatry: Appropriate affect.  Not agitated  Skin: Warm, dry, normal turgor, no rash    Labs:  CBC:   Lab Results   Component Value Date    WBC 9.8 06/17/2021    RBC 5.13 06/17/2021    HGB 15.1 06/17/2021    HCT 44.8 06/17/2021    MCV 87.3 06/17/2021    MCH 29.3 06/17/2021    MCHC 33.6 06/17/2021    RDW 14.1 06/17/2021     06/17/2021    MPV 7.9 06/17/2021     BMP:    Lab Results   Component Value Date     06/17/2021    K 3.6 06/17/2021    K 3.6 04/27/2018     06/17/2021    CO2 26 06/17/2021    BUN 19 06/17/2021    CREATININE 1.2 06/17/2021    CALCIUM 9.2 06/17/2021    GFRAA >60 06/17/2021    GFRAA >60 12/11/2009    LABGLOM 58 06/17/2021    GLUCOSE 118 06/17/2021       Chest Xray:   EKG:    I visualized CXR images and EKG strips      Problem List  Active Problems:    Syncope, near  Resolved Problems:    * No resolved hospital problems. *        Assessment/Plan:   Syncope  -Unclear etiology.  -2 episode. -EKG CT abdomen otherwise negative.  -We will admit for further evaluation  -We will be monitoring telemetry for any arrhythmias  -Get echocardiogram and carotid ultrasound  -MRI to rule out any acute stroke.   -Orthostatic vitals    Hypertension  -Pressure is high in the ER.  -We will have permissive hypertension for now  -Monitor closely        Sherry Camacho MD    6/17/2021 3:51 PM

## 2021-06-18 VITALS
WEIGHT: 228.2 LBS | HEIGHT: 74 IN | OXYGEN SATURATION: 98 % | BODY MASS INDEX: 29.29 KG/M2 | TEMPERATURE: 98.3 F | DIASTOLIC BLOOD PRESSURE: 92 MMHG | SYSTOLIC BLOOD PRESSURE: 175 MMHG | HEART RATE: 66 BPM | RESPIRATION RATE: 18 BRPM

## 2021-06-18 LAB
LV EF: 63 %
LVEF MODALITY: NORMAL

## 2021-06-18 PROCEDURE — 2580000003 HC RX 258: Performed by: HOSPITALIST

## 2021-06-18 PROCEDURE — G0378 HOSPITAL OBSERVATION PER HR: HCPCS

## 2021-06-18 PROCEDURE — 93306 TTE W/DOPPLER COMPLETE: CPT

## 2021-06-18 PROCEDURE — 93880 EXTRACRANIAL BILAT STUDY: CPT

## 2021-06-18 PROCEDURE — 99204 OFFICE O/P NEW MOD 45 MIN: CPT | Performed by: PSYCHIATRY & NEUROLOGY

## 2021-06-18 PROCEDURE — 96374 THER/PROPH/DIAG INJ IV PUSH: CPT

## 2021-06-18 PROCEDURE — 6360000002 HC RX W HCPCS: Performed by: HOSPITALIST

## 2021-06-18 RX ORDER — ATENOLOL 25 MG/1
25 TABLET ORAL DAILY
Qty: 30 TABLET | Refills: 0 | Status: ON HOLD | OUTPATIENT
Start: 2021-06-18 | End: 2021-07-16 | Stop reason: SDUPTHER

## 2021-06-18 RX ADMIN — Medication 10 ML: at 10:10

## 2021-06-18 RX ADMIN — HYDRALAZINE HYDROCHLORIDE 10 MG: 20 INJECTION INTRAMUSCULAR; INTRAVENOUS at 12:39

## 2021-06-18 ASSESSMENT — PAIN SCALES - GENERAL
PAINLEVEL_OUTOF10: 0
PAINLEVEL_OUTOF10: 0

## 2021-06-18 NOTE — CONSULTS
In patient Neurology consult        [facility] Neurology      Ajay Alfonso MD      Bill Vieirajose m  1935    Date of Service: 6/18/2021    Referring Physician: Darby Salamanca MD      Reason for the consult and CC: Acute syncope    History was obtained from chart review and discussion with the patient's family. The patient is not a good historian. HPI:   The patient is a 81y.o.  years old male with history of dementia, hypertension hyperlipidemia who was admitted to the hospital yesterday with acute LOC. Onset was several hours prior to admission at home. Description was several minutes of staring and not responding to questions. Degree was moderate. Duration was minutes. No witnessed seizure, tongue biting or bladder incontinence or postictal state. No aura or warning. No other relieving or aggravating factors. No recent change in medications. No head trauma or fever. Patient came to the ED where he was back to his baseline. Initial CT head showed no acute findings. He was admitted. Today he denies any new symptoms. He was not able to tolerate MRI and is refusing another attempt of MRI at this point. No focal weakness or chest pain today. Other review of system was unremarkable.       Family History   Problem Relation Age of Onset    Heart Disease Neg Hx     High Blood Pressure Neg Hx     High Cholesterol Neg Hx      Past Surgical History:   Procedure Laterality Date    COLONOSCOPY      CYSTOSCOPY  04/04/2018    FLEXIBLE CYSTOSCOPY    EYE SURGERY Right 11/2014    Cataract removal    KNEE ARTHROSCOPY      TURP  04/26/2018        Past Medical History:   Diagnosis Date    Dementia ([address]     HTN (hypertension)     Hyperlipidemia     PONV (postoperative nausea and vomiting)      Social History     Tobacco Use    Smoking status: Never Smoker    Smokeless tobacco: Never Used   Vaping Use    Vaping Use: Never used   Substance Use Topics    Alcohol use: Yes     Comment: occasional glass of wine    Drug use: No     No Known Allergies  Current Facility-Administered Medications   Medication Dose Route Frequency Provider Last Rate Last Admin    atorvastatin (LIPITOR) tablet 40 mg  40 mg Oral Daily Ryann Nielsen MD   40 mg at 06/17/21 2056    sodium chloride flush 0.9 % injection 5-40 mL  5-40 mL Intravenous 2 times per day Grady Snow MD   10 mL at 06/18/21 1010    sodium chloride flush 0.9 % injection 5-40 mL  5-40 mL Intravenous PRN Ryann Nielsen MD        0.9 % sodium chloride infusion  25 mL Intravenous PRN Ryann Nielsen MD        enoxaparin (LOVENOX) injection 40 mg  40 mg Subcutaneous Nightly Ryann Nielsen MD   40 mg at 06/17/21 2056    ondansetron (ZOFRAN-ODT) disintegrating tablet 4 mg  4 mg Oral Q8H PRN Ryann Nielsen MD        Or    ondansetron (ZOFRAN) injection 4 mg  4 mg Intravenous Q6H PRN Ryann Nielsen MD        polyethylene glycol (GLYCOLAX) packet 17 g  17 g Oral Daily PRN Grady Snow MD        acetaminophen (TYLENOL) tablet 650 mg  650 mg Oral Q6H PRN Ryann Nielsen MD        Or   Airam Curl acetaminophen (TYLENOL) suppository 650 mg  650 mg Rectal Q6H PRN Ryann Nielsen MD        hydrALAZINE (APRESOLINE) injection 10 mg  10 mg Intravenous Q6H PRN Grady Snow MD           ROS : A 10-14 system review of constitutional, cardiovascular, respiratory, eyes, musculoskeletal, endocrine, GI, ENT, skin, hematological, genitourinary, psychiatric and neurologic systems was obtained and updated today and is unremarkable except as mentioned in my HPI      Exam:     Constitutional:   Vitals:    06/18/21 0015 06/18/21 0410 06/18/21 0742 06/18/21 1000   BP: (!) 169/96 (!) 162/93  (!) 174/89   Pulse: 60 65  57   Resp: 20 20 18   Temp: 98 °F (36.7 °C) 98 °F (36.7 °C)  97.8 °F (36.6 °C)   TempSrc: Oral Oral  Oral   SpO2: 96% 98%  97%   Weight:   228 lb 3.2 oz (103.5 kg)    Height:           General appearance:  Normal development and appear in no acute distress.    Eye:  Fundus of the eye: Funduscopic examination cannot be performed due to COVID19 restrictions and precautions. Neck: supple  Cardiovascular: No lower leg edema with good pulsation. Mental Status:   AAO x2  Disorientated to problem  Poor immediate recall  Intact remote memory  Poor fund of knowledge  Hard of hearing  Language is fluent  Cranial Nerves:   II: Visual fields: Full. Pupils: equal, round, reactive to light  III,IV,VI: Extra Ocular Movements are intact. No nystagmus  V: Facial sensation is intact   VII: Facial strength and movements: intact and symmetric  VIII: Hearing: I hard of hearing  IX: Palate elevation is symmetric  XI: Shoulder shrug is intact  XII: Tongue movements are normal  Musculoskeletal: 5/5 in all 4 extremities. Tone: Normal tone. Reflexes: 2+ in the upper extremity and 2+ in the lower extremity   Planters: flexor bilaterally. Coordination: No tremors or dysmetria sensation: normal to all modalities in both arms and legs. Gait/Posture: Not tested for cooperation    Data:  LABS:   Lab Results   Component Value Date     06/17/2021    K 3.6 06/17/2021    K 3.6 04/27/2018     06/17/2021    CO2 26 06/17/2021    BUN 19 06/17/2021    CREATININE 1.2 06/17/2021    GFRAA >60 06/17/2021    GFRAA >60 12/11/2009    LABGLOM 58 06/17/2021    GLUCOSE 118 06/17/2021    PHOS 2.5 03/19/2018    MG 2.00 03/19/2018    CALCIUM 9.2 06/17/2021     Lab Results   Component Value Date    WBC 9.8 06/17/2021    RBC 5.13 06/17/2021    HGB 15.1 06/17/2021    HCT 44.8 06/17/2021    MCV 87.3 06/17/2021    RDW 14.1 06/17/2021     06/17/2021   No results found for: INR, PROTIME    Neuroimaging were independently reviewed by me and discussed results with the patient and his family  Reviewed notes from different physicians  Reviewed lab and blood testing    Impression:  Acute syncope. So far no clear etiology. Less likely seizure or vascular.   Exam today is nonfocal.  No need to repeat MRI at this point as the patient is refusing another attempt. Hypertension, not controlled  Chronic cognitive impairment, dementia, severe. Hyperlipidemia    Recommendation:  Agree with vascular study  PT OT  Hydration  Telemetry  Echo  Blood pressure monitor  Resume home blood pressure medications  Statin  Aspirin  Discussed risk of fall and injury with his family today  Can be discharged once medically stable  If symptoms recur, would recommend prolonged cardiac monitoring  Nothing to add from neurology  We will sign off        Thank you for referring such patient. If you have any questions regarding my consult note, please don't hesitate to call me. Ghanshyam Acuna MD  [phone]    This dictation was generated by voice recognition computer software.  Although all attempts are made to edit the dictation for accuracy, there may be errors in the  transcription that are not intended

## 2021-06-18 NOTE — PROGRESS NOTES
After pt unable to complete MRI due to claustrophobia, pt and daughter questioning what time pt will be dc'ed today. Pt and daughter not receptive to medication to help pt through MRI.

## 2021-06-18 NOTE — DISCHARGE SUMMARY
Pt discharged to home with family. Paperwork explained and given to pt daughter. Pt signed and copy was given to them and the other placed in chart. IV and telel removed. Pt and daughter were told that a wheelchair will be brought to them and they walked out anyway. May have MRI as outpatient.

## 2021-06-18 NOTE — PLAN OF CARE
Problem: Falls - Risk of:  Goal: Will remain free from falls  Description: Will remain free from falls  6/18/2021 0512 by Sampson Vela RN  Note: Patient free from harm. ID bands on, bed in lowest position, call light in reach. Patient instructed to call for help if needed. Patient educated on ambulation and safety.     6/17/2021 1819 by Jayson Eduardo RN  Outcome: Ongoing  Goal: Absence of physical injury  Description: Absence of physical injury  6/17/2021 1819 by Jayson Eduardo RN  Outcome: Ongoing

## 2021-06-19 NOTE — DISCHARGE SUMMARY
[facility] DISCHARGE SUMMARY    Patient Demographics    Patient. Cj Murphy  Date of Birth. 1935  MRN. [id number]     Primary care provider. Buddy Bolanos. Lisha Gonzalez  (Tel: [phone])    Admit date: 6/17/2021    Discharge date (blank if same as Note Date): 6/18/2021  Note Date: 6/19/2021     Reason for Hospitalization. Chief Complaint   Patient presents with    Loss of Consciousness           [facility] Course. Syncope  Unclear etiology could be secondary to uncontrolled hypertension acute stroke was ruled out echo and carotid were negative-  -patient was resumed on his home dose medication. We will by neurology.  -Patient was discharged stable home with daughter    Consults. IP CONSULT TO NEUROLOGY    Physical examination on discharge day. BP (!) 175/92   Pulse 66   Temp 98.3 °F (36.8 °C) (Oral)   Resp 18   Ht 6' 2\" (1.88 m)   Wt 228 lb 3.2 oz (103.5 kg)   SpO2 98%   BMI 29.30 kg/m²   General appearance. Alert. Looks comfortable. HEENT. Sclera clear. Moist mucus membranes. Cardiovascular. Regular rate and rhythm, normal S1, S2. No murmur. Respiratory. Not using accessory muscles. Clear to auscultation bilaterally, no wheeze. Gastrointestinal. Abdomen soft, non-tender, not distended, normal bowel sounds  Neurology. Facial symmetry. No speech deficits. Moving all extremities equally. Extremities. No edema in lower extremities. Skin. Warm, dry, normal turgor    Condition at time of discharge stable     Medication instructions provided to patient at discharge. Medication List      CHANGE how you take these medications    atorvastatin 20 MG tablet  Commonly known as: LIPITOR  Take 1 tablet by mouth daily.   What changed: how much to take        CONTINUE taking these medications    atenolol 25 MG tablet  Commonly known as: TENORMIN  Take 1 tablet by mouth daily Where to Get Your Medications      These medications were sent to [facility] [address], [address]  [address] [place] [address] Pkw[address], [address]    Phone: [phone]   · atenolol 25 MG tablet         Discharge recommendations given to patient. Follow Up. pcp in 1 week   Disposition. home  Activity. activity as tolerated  Diet: No diet orders on file      Spent 45  minutes in discharge process.     Signed:  Darby Salamanca MD     6/19/2021 4:29 PM

## 2021-06-23 ENCOUNTER — HOSPITAL ENCOUNTER (INPATIENT)
Age: 86
LOS: 23 days | Discharge: HOME HEALTH | DRG: 056 | End: 2021-07-16
Attending: PHYSICAL MEDICINE & REHABILITATION | Admitting: PHYSICAL MEDICINE & REHABILITATION
Payer: MEDICARE

## 2021-06-23 PROBLEM — I63.9 ACUTE EMBOLIC STROKE (HCC): Status: ACTIVE | Noted: 2021-06-23

## 2021-06-23 LAB
GLUCOSE BLD-MCNC: 109 MG/DL (ref 70–99)
PERFORMED ON: ABNORMAL

## 2021-06-23 PROCEDURE — 1280000000 HC REHAB R&B

## 2021-06-23 PROCEDURE — 6370000000 HC RX 637 (ALT 250 FOR IP): Performed by: PHYSICAL MEDICINE & REHABILITATION

## 2021-06-23 RX ORDER — FINASTERIDE 5 MG/1
5 TABLET, FILM COATED ORAL DAILY
Status: DISCONTINUED | OUTPATIENT
Start: 2021-06-24 | End: 2021-07-16 | Stop reason: HOSPADM

## 2021-06-23 RX ORDER — DIPHENHYDRAMINE HCL 25 MG
25 TABLET ORAL EVERY 6 HOURS PRN
Status: DISCONTINUED | OUTPATIENT
Start: 2021-06-23 | End: 2021-07-16 | Stop reason: HOSPADM

## 2021-06-23 RX ORDER — ATENOLOL 50 MG/1
25 TABLET ORAL DAILY
Status: DISCONTINUED | OUTPATIENT
Start: 2021-06-24 | End: 2021-07-16 | Stop reason: HOSPADM

## 2021-06-23 RX ORDER — HYDRALAZINE HYDROCHLORIDE 25 MG/1
50 TABLET, FILM COATED ORAL EVERY 8 HOURS PRN
Status: DISCONTINUED | OUTPATIENT
Start: 2021-06-23 | End: 2021-07-16 | Stop reason: HOSPADM

## 2021-06-23 RX ORDER — ONDANSETRON 4 MG/1
4 TABLET, ORALLY DISINTEGRATING ORAL EVERY 8 HOURS PRN
Status: DISCONTINUED | OUTPATIENT
Start: 2021-06-23 | End: 2021-07-16 | Stop reason: HOSPADM

## 2021-06-23 RX ORDER — ACETAMINOPHEN 325 MG/1
650 TABLET ORAL EVERY 4 HOURS PRN
Status: DISCONTINUED | OUTPATIENT
Start: 2021-06-23 | End: 2021-07-16 | Stop reason: HOSPADM

## 2021-06-23 RX ORDER — TRAZODONE HYDROCHLORIDE 50 MG/1
50 TABLET ORAL NIGHTLY PRN
Status: DISCONTINUED | OUTPATIENT
Start: 2021-06-23 | End: 2021-07-16 | Stop reason: HOSPADM

## 2021-06-23 RX ORDER — ATORVASTATIN CALCIUM 40 MG/1
40 TABLET, FILM COATED ORAL NIGHTLY
Status: DISCONTINUED | OUTPATIENT
Start: 2021-06-23 | End: 2021-07-16 | Stop reason: HOSPADM

## 2021-06-23 RX ORDER — ASPIRIN 81 MG/1
81 TABLET, CHEWABLE ORAL DAILY
Status: DISCONTINUED | OUTPATIENT
Start: 2021-06-24 | End: 2021-07-16 | Stop reason: HOSPADM

## 2021-06-23 RX ORDER — CLOPIDOGREL BISULFATE 75 MG/1
75 TABLET ORAL DAILY
Status: COMPLETED | OUTPATIENT
Start: 2021-06-24 | End: 2021-07-10

## 2021-06-23 RX ORDER — TRAMADOL HYDROCHLORIDE 50 MG/1
50 TABLET ORAL EVERY 6 HOURS PRN
Status: DISCONTINUED | OUTPATIENT
Start: 2021-06-23 | End: 2021-07-16 | Stop reason: HOSPADM

## 2021-06-23 RX ORDER — TAMSULOSIN HYDROCHLORIDE 0.4 MG/1
0.4 CAPSULE ORAL DAILY
Status: DISCONTINUED | OUTPATIENT
Start: 2021-06-24 | End: 2021-07-16 | Stop reason: HOSPADM

## 2021-06-23 RX ADMIN — ATORVASTATIN CALCIUM 40 MG: 40 TABLET, FILM COATED ORAL at 21:42

## 2021-06-23 ASSESSMENT — PAIN SCALES - GENERAL
PAINLEVEL_OUTOF10: 0
PAINLEVEL_OUTOF10: 0

## 2021-06-23 NOTE — PLAN OF CARE
Pt arrived to floor to room 4910  via stretcher from [address] accompanied by EMT. Pt un  able to ambulate or out of bed until evaluated by physical therapy. Admission and assessment complete. See doc flowsheet and admission tab. POC and education initiated and reviewed with patient. Denied further questions at this time. Educated on use of call light, phone, bed and floor policies. Call light with in reach, will continue to monitor.

## 2021-06-23 NOTE — PLAN OF CARE
4 Eyes Skin Assessment     NAME:  Jayson Glover  YOB: 1935  MEDICAL RECORD NUMBER:  [id number]    The patient is being assess for  Admission    I agree that 2 RN's have performed a thorough Head to Toe Skin Assessment on the patient. ALL assessment sites listed below have been assessed. Areas assessed by both nurses:    Head, Face, Ears, Shoulders, Back, Chest, Arms, Elbows, Hands, Sacrum. Buttock, Coccyx, Ischium and Legs. Feet and Heels        Does the Patient have a Wound?  No noted wound(s)       [place] Prevention initiated:  Yes   Wound Care Orders initiated:  No    Pressure Injury (Stage 3,4, Unstageable, DTI, NWPT, and Complex wounds) if present place consult order under [de-identified] NA    New and Established Ostomies if present place consult order under : No      Nurse 1 eSignature: Electronically signed by Sowmya Carrera RN on 6/23/21 at 4:25 PM EDT    **SHARE this note so that the co-signing nurse is able to place an eSignature**    Nurse 2 eSignature: Electronically signed by Alia Mendoza RN on 6/23/21 at 6:51 PM EDT

## 2021-06-23 NOTE — PLAN OF CARE
Problem: Falls - Risk of:  Goal: Will remain free from falls  Description: Will remain free from falls  6/23/2021 1606 by Rhiannon Murrieta RN  Outcome: Ongoing  6/23/2021 1604 by Rhiannon Murrieta RN  Outcome: Ongoing  Goal: Absence of physical injury  Description: Absence of physical injury  6/23/2021 1606 by Rhiannon Murrieta RN  Outcome: Ongoing  6/23/2021 1604 by Rhiannon Murrieta RN  Outcome: Ongoing

## 2021-06-23 NOTE — PLAN OF CARE
ARU PATIENT TREATMENT PLAN  [address], [address] [place] [address] Drive  [phone]      Nila Howell    : 1935  Acct #: [de-identified]  MRN: [id number]  PHYSICIAN:  Pooja Garcia MD  Primary Problem    Patient Active Problem List   Diagnosis    Essential hypertension    Hyperlipidemia    Palpitations    Family history of premature CAD    Shoulder pain    Knee pain    Late onset Alzheimer's disease without behavioral disturbance ([address]    Acute renal failure [facility]) [facility]    Benign prostatic hyperplasia (BPH) with post-void dribbling    Near syncope    Dementia due to Alzheimer's disease ([facility]    Acute embolic stroke ([facility]       Rehabilitation Diagnosis:      Right frontal and parietal watershed infarcts: ASA, statin. Plavix for 21 days. PT/OT/SLP. Event monitor mentioned in discharge notes but no monitor noted to be with the patient today. We will follow up and ensure placement given his breathing episodes below.     Intermittent tachypnea: HR decreased during episodes. Monitor     CAD: ASA, statin      HTN: atenolol      Dementia: SLP     Urinary retention: flomax, proscar. Recently required cm placement    ADMIT DATE:2021    Patient Goals: To go home. Admitting Impairments: Stroke - Left Body Involvement (Right Brain)  Activities: Impaired Eating, Hygiene, Toileting, Bathing, Dressing, Bed Mobility, Transfers, Ambulation, Stairs, and Endurance. Participation: Prior to admission, patient was living at home with family, was independent with all mobility and activities, is an active .      CARE PLAN     NURSING:  Nila Howell while on this unit will:  [x] Be continent of bowel and bladder     [x] Have an adequate number of bowel movements  [x] Urinate with no urinary retention >300ml in bladder  [] Complete bladder protocol with cm removal  [x] Maintain O2 SATs at _93__%  [x] Have pain managed while on ARU       [] Be pain free by discharge   [x] Have no skin breakdown while on ARU  [] Have improved skin integrity via wound measurements  [] Have no signs/symptoms of infection at the wound site  [x] Be free from injury during hospitalization   [x] Complete education with patient/family with understanding demonstrated for:  [] Adjustment   [] Other:     Nursing Interventions will include:  [x] bowel/bladder training   [] education for medical assistive devices   [x] medication education   [x] O2 saturation management   [x] energy conservation   [x] stress management techniques   [x] fall prevention   [x] alarms protocol   [] seating and positioning   [] skin/wound care   [x] pressure relief instruction   [] dressing changes     [] infection protection   [x] DVT prophylaxis  [x] assistance with in room safety with transfers to bed, toilet, wheelchair, shower   [x] bathroom activities and hygiene  [] Other:    Patient/Caregiver Education for:  [x] Disease/sustained injury/management     [x] Medication Use  [] Surgical intervention  [x] Safety  [x] Body mechanics and or joint protection  [] Health maintenance     [] Other:     PHYSICAL THERAPY:  Goals:                  Short term goals  Time Frame for Short term goals: 1-2 weeks  Short term goal 1: Pt will perform all bed mobility with maxA of 1  Short term goal 2: Pt will perform SPT with maxA of 1  Short term goal 3: Pt will ambulate 8' with LRAD with maxA of 1  Short term goal 4: Pt will negotiate 1 step with LRAD and maxA of 1            Long term goals  Time Frame for Long term goals : 2-3 weeks  Long term goal 1: Pt will perform all bed mobility with CGA  Long term goal 2: Pt will perform all transfers with CGA  Long term goal 3: Pt will ambulate 48' with LRAD CGA  Long term goal 4: Pt will negotiate 12 steps with kapil  These goals were reviewed with this patient at the time of assessment and Toni Luna is in agreement.      Plan of Care: Pt to be seen 5 out of 7 days per week per ARU protocol ( 60 minutes with PT)                  Current Treatment Recommendations: Strengthening, Transfer Training, Endurance Training, Neuromuscular Re-education, Patient/Caregiver Education & Training, ROM, Wheelchair Mobility Training, Manual Therapy - Soft Tissue Mobilization, Equipment Evaluation, Education, & procurement, Balance Training, Gait Training, Functional Mobility Training, Stair training, Safety Education & Training, Positioning    OCCUPATIONAL THERAPY:  Goals:             Short term goals  Time Frame for Short term goals: 1-2 weeks  Short term goal 1: Functional transfer for ADL completion w/ assist x1  Short term goal 2: Bathing w/ assist x1  Short term goal 3: LB dressing w/ assist x1  Short term goal 4: Toileting w/ assist x1  Short term goal 5: UB dressing w/ Min A :  Long term goals  Time Frame for Long term goals : 3-4 weeks  Long term goal 1: Functional transfer for ADL completion w/ supervision  Long term goal 2: Bathing w/ supervision  Long term goal 3: LB dressing w/ supervision  Long term goal 4: Toileting w/ supervision  Long term goal 5: UB dressing w/ supervision : These goals were reviewed with this patient at the time of assessment and Evelia Lau is in agreement    Plan of Care:  Pt to be seen 5 out of 7 days per week per ARU protocol (60 minutes with OT)  Patient Education: Pt is not an independent learner    SPEECH THERAPY: Goals will be left blank if speech is not following this patient. Goals:             Short-term Goals  Goal 1: Pt will tolerate recommended diet and advanced trials with use of compensatory swallow strategies provided with < mod cues with no overt clinical s/s of aspiration or noted difficulty. Short-term Goals  Goal 1: Pt will respond and/or initiate action upon verbal prompt within 3 seconds in 4 out of 5 opportunities without repetition.   Goal 2: Pt will use visual aids/ strategies to state orientation to time, place, situation with 100% accuracy across 3 consecutive sessions. Goal 3: Pt will improve oral motor function, articulatory precision and breath support in connected speech via graded tasks to >80% acc with use of speech strategies. Goal 4: Pt will improve verbal initiation and thought organization for extended utterances (i.e. related to personal hx/ recall of recent events, etc.) >75% acc. Goal 5: Patient will complete fx problem solving tasks and demonstrate insight into limitations and impact on daily functioning with >75% acc, min cues. Plan of Care:  Pt to be seen 5 out of 7 days per week per ARU protocol (60 minutes with SLP)    Therapy Treatments will include:  [x]  therapeutic exercises    [x]  gait training     [x]  neuromuscular re-ed                            [x]  transfer training             [x] community reintegration    [x] bed mobility                          [x]  w/c mobility and training  [x]  self care    [x]home mgmt    [x]  cognitive training            []  energy conservation        [x]  dysphagia tx    [x]  speech/language/communication therapy   [x]  group therapy    [x]  patient/family education    [] Other:    CASE MANAGEMENT:  Goals:   Assist patient/family with discharge planning, patient/family counseling, and coordination with insurance during ARU stay. Baron Russo will be seen a minimum of 3 hours of therapy per day, a minimum of 5 out of 7 days per week  (please see above for specific treatment plan per PT/OT/SLP). [] In this rare instance due to the nature of this patient's medical involvement, this patient will be seen 15 hours per week (900 minutes within a 7 day period). In addition, dietician/nutritionist may monitor calorie count as well as intake and collaboratively work with SLP on dietary upgrades. Neuropsychology/Psychology may evaluate and provide necessary support.     Medical issues being managed closely and that require 24 hour availability of a

## 2021-06-23 NOTE — PLAN OF CARE
Patient encourage to use call light with any needs. Patient states understanding, call light in reach, bed alarm on. Will continue to monitor.  Pain meds will be given for c/o pain and pt will be reassessed for decrease level of pain

## 2021-06-24 LAB
ANION GAP SERPL CALCULATED.3IONS-SCNC: 10 MMOL/L (ref 3–16)
BUN BLDV-MCNC: 21 MG/DL (ref 7–20)
CALCIUM SERPL-MCNC: 9 MG/DL (ref 8.3–10.6)
CHLORIDE BLD-SCNC: 105 MMOL/L (ref 99–110)
CO2: 24 MMOL/L (ref 21–32)
CREAT SERPL-MCNC: 1 MG/DL (ref 0.8–1.3)
GFR AFRICAN AMERICAN: >60
GFR NON-AFRICAN AMERICAN: >60
GLUCOSE BLD-MCNC: 115 MG/DL (ref 70–99)
HCT VFR BLD CALC: 40.6 % (ref 40.5–52.5)
HEMOGLOBIN: 13.6 G/DL (ref 13.5–17.5)
MCH RBC QN AUTO: 29.3 PG (ref 26–34)
MCHC RBC AUTO-ENTMCNC: 33.4 G/DL (ref 31–36)
MCV RBC AUTO: 87.7 FL (ref 80–100)
PDW BLD-RTO: 14.3 % (ref 12.4–15.4)
PLATELET # BLD: 271 K/UL (ref 135–450)
PMV BLD AUTO: 8.9 FL (ref 5–10.5)
POTASSIUM SERPL-SCNC: 3.8 MMOL/L (ref 3.5–5.1)
RBC # BLD: 4.63 M/UL (ref 4.2–5.9)
SODIUM BLD-SCNC: 139 MMOL/L (ref 136–145)
WBC # BLD: 12.5 K/UL (ref 4–11)

## 2021-06-24 PROCEDURE — 97530 THERAPEUTIC ACTIVITIES: CPT

## 2021-06-24 PROCEDURE — 36415 COLL VENOUS BLD VENIPUNCTURE: CPT

## 2021-06-24 PROCEDURE — 92523 SPEECH SOUND LANG COMPREHEN: CPT

## 2021-06-24 PROCEDURE — 6370000000 HC RX 637 (ALT 250 FOR IP): Performed by: PHYSICAL MEDICINE & REHABILITATION

## 2021-06-24 PROCEDURE — 92610 EVALUATE SWALLOWING FUNCTION: CPT

## 2021-06-24 PROCEDURE — 97535 SELF CARE MNGMENT TRAINING: CPT

## 2021-06-24 PROCEDURE — 1280000000 HC REHAB R&B

## 2021-06-24 PROCEDURE — 92526 ORAL FUNCTION THERAPY: CPT

## 2021-06-24 PROCEDURE — 6360000002 HC RX W HCPCS: Performed by: PHYSICAL MEDICINE & REHABILITATION

## 2021-06-24 PROCEDURE — 85027 COMPLETE CBC AUTOMATED: CPT

## 2021-06-24 PROCEDURE — 97162 PT EVAL MOD COMPLEX 30 MIN: CPT

## 2021-06-24 PROCEDURE — 97167 OT EVAL HIGH COMPLEX 60 MIN: CPT

## 2021-06-24 PROCEDURE — 80048 BASIC METABOLIC PNL TOTAL CA: CPT

## 2021-06-24 RX ADMIN — ASPIRIN 81 MG: 81 TABLET, CHEWABLE ORAL at 10:06

## 2021-06-24 RX ADMIN — ENOXAPARIN SODIUM 40 MG: 40 INJECTION SUBCUTANEOUS at 10:08

## 2021-06-24 RX ADMIN — CLOPIDOGREL BISULFATE 75 MG: 75 TABLET ORAL at 10:06

## 2021-06-24 RX ADMIN — ATENOLOL 25 MG: 50 TABLET ORAL at 10:06

## 2021-06-24 RX ADMIN — ATORVASTATIN CALCIUM 40 MG: 40 TABLET, FILM COATED ORAL at 20:16

## 2021-06-24 RX ADMIN — FINASTERIDE 5 MG: 5 TABLET, FILM COATED ORAL at 10:06

## 2021-06-24 RX ADMIN — TAMSULOSIN HYDROCHLORIDE 0.4 MG: 0.4 CAPSULE ORAL at 10:07

## 2021-06-24 ASSESSMENT — PAIN SCALES - GENERAL: PAINLEVEL_OUTOF10: 0

## 2021-06-24 NOTE — PLAN OF CARE
Problem: Falls - Risk of:  Goal: Will remain free from falls  Description: Will remain free from falls  6/24/2021 1241 by Mikael Gould RN  Outcome: Ongoing  Note: Pt will be free from falls , pt aware of ARU policy on falls, will call for needs/assistance. Fall risk precautions in place, call light in reach, bed in lowest position, 2/2 bed rails up, bed wheels locked, bed side table within reach, bed alarm on, will continue to monitor. Problem: Falls - Risk of:  Goal: Absence of physical injury  Description: Absence of physical injury  Outcome: Ongoing  Note: No injuries this shift. Will continue to assess and monitor. Problem: Skin Integrity:  Goal: Absence of new skin breakdown  Description: Absence of new skin breakdown  Outcome: Ongoing  Note: No new skin breakdown identified. Problem: Injury - Risk of, Physical Injury:  Goal: Will remain free from falls  Description: Will remain free from falls  6/24/2021 1241 by Mikael Gould RN  Outcome: Ongoing  Note: Pt will be free from falls , pt aware of ARU policy on falls, will call for needs/assistance. Fall risk precautions in place, call light in reach, bed in lowest position, 2/2 bed rails up, bed wheels locked, bed side table within reach, bed alarm on, will continue to monitor. Problem: Injury - Risk of, Physical Injury:  Goal: Absence of physical injury  Description: Absence of physical injury  Outcome: Ongoing  Note: No injuries this shift. Will continue to assess and monitor. Problem: Mood - Altered:  Goal: Mood stable  Description: Mood stable  Outcome: Ongoing  Note: Patient is calm and cooperative. Problem: IP BOWEL ELIMINATION  Goal: LTG - patient will have regular and routine bowel evacuation  Outcome: Ongoing  Note: Patient has ileostomy.

## 2021-06-24 NOTE — DISCHARGE INSTR - COC
Continuity of Care Form    Patient Name: Anirudh Rinaldi   :  1935  MRN:  [id number]    Admit date:  2021  Discharge date:  21    Code Status Order: Full Code   Advance Directives:   Advance Care Flowsheet Documentation       Date/Time Healthcare Directive Type of Healthcare Directive Copy in [address] Agent's Name Healthcare Agent's Phone Number    [phone]  Yes, patient has an advance directive for healthcare treatment -- -- -- -- --            Admitting Physician:  Florencio Ramirez MD  PCP: Javed Morfin DO    Discharging Nurse:  ERROL Thomas, [facility] Unit/Room#: UKL-4910/7642-89  Discharging Unit Phone Number: [phone]    Emergency Contact:   Extended Emergency Contact Information  Primary Emergency Contact: Mae Strong PREBEKAH [address] Phone: [phone]  Relation: Child    Past Surgical History:  Past Surgical History:   Procedure Laterality Date    COLONOSCOPY      CYSTOSCOPY  2018    FLEXIBLE CYSTOSCOPY    EYE SURGERY Right 2014    Cataract removal    KNEE ARTHROSCOPY      TURP  2018       Immunization History:   Immunization History   Administered Date(s) Administered    Influenza, MDCK Quadv, IM, PF (Flucelvax 4 yrs and older) 2018       Active Problems:  Patient Active Problem List   Diagnosis Code    Essential hypertension I10    Hyperlipidemia E78.5    Palpitations R00.2    Family history of premature CAD Z82.49    Shoulder pain M25.519    Knee pain M25.569    Late onset Alzheimer's disease without behavioral disturbance ([address] G30.1, F02.80    Acute renal failure (ARF) ([address] N17.9    Benign prostatic hyperplasia (BPH) with post-void dribbling N40.1, N39.43    Near syncope R55    Dementia due to Alzheimer's disease ([address] G30.9, N51.14    Acute embolic stroke ([address] O49.9       Isolation/Infection:   Isolation            No Isolation          Patient Infection Status       None to display Nurse Assessment:  Last Vital Signs: /75   Pulse 73   Temp 99.2 °F (37.3 °C) (Oral)   Resp 18   Ht 6' 2\" (1.88 m)   Wt 226 lb 8 oz (102.7 kg)   SpO2 94%   BMI 29.08 kg/m²     Last documented pain score (0-10 scale): Pain Level: 0  Last Weight:   Wt Readings from Last 1 Encounters:   06/23/21 226 lb 8 oz (102.7 kg)     Mental Status:  alert and simple answers appropriately when ask.  poor initiation, poor insight, poor problemsolving    IV Access:  - None    Nursing Mobility/ADLs:  Walking   Dependent  Transfer  Dependent  Bathing  Dependent  Dressing  Dependent  Toileting  Dependent  Feeding  Assisted  Med Admin  Assisted  Med Delivery   whole and with puree    Wound Care Documentation and Therapy:        Elimination:  Continence:   · Bowel: Yes. Incontinent at times. · Bladder: No.  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 1:1 supervision with meal.    Routes of Feeding: Oral  Liquids: Nectar Thick Liquids  Daily Fluid Restriction: no  Last Modified Barium Swallow with Video (Video Swallowing Test): done on 6/28/2021/. Treatments at the Time of Hospital Discharge:   Respiratory Treatments: none  Oxygen Therapy:  is not on home oxygen therapy.   Ventilator:    - No ventilator support    Rehab Therapies: Physical Therapy, Occupational Therapy and Speech/Language Therapy  Weight Bearing Status/Restrictions: No weight bearing restirctions  Other Medical Equipment (for information only, NOT a DME order):  See MD order  Other Treatments: no    Patient's personal belongings (please select all that are sent with patient):  None    RN SIGNATURE:  Electronically signed by Miky Walsh RN on 7/16/21 at 10:18 AM EDT    CASE MANAGEMENT/SOCIAL WORK SECTION    Inpatient Status Date: 6/23/2021    Readmission Risk Assessment Score:  Readmission Risk              Risk of Unplanned Readmission:  9           Discharging to Facility/ Agency   · Name: [facility]  · Phone: [phone]  · Fax: [phone]       / signature: Electronically signed by WING Galloway on 7/15/2021 at 4:41 PM    PHYSICIAN SECTION    Prognosis: Fair    Condition at Discharge: Stable    Rehab Potential (if transferring to Rehab): Fair    Recommended Labs or Other Treatments After Discharge: PT/OT/SLP/RN/aide/MSW    Physician Certification: I certify the above information and transfer of Liana Postal  is necessary for the continuing treatment of the diagnosis listed and that he requires Home Care for greater 30 days.      Update Admission H&P: No change in H&P    PHYSICIAN SIGNATURE:  Electronically signed by Joyce Urbina MD on 7/16/21 at 8:19 AM EDT

## 2021-06-24 NOTE — PROGRESS NOTES
Physical Therapy    Facility/Department: [facility] ACUTE REHAB UNIT  Initial Assessment    NAME: Jacinda Chahal  : 1935  MRN: [id number]    Date of Service: 2021    Discharge Recommendations:  24 hour supervision or assist, Home with Home health PT, S Level 3   PT Equipment Recommendations  Equipment Needed: Yes  Other: TBD with progress    Assessment   Body structures, Functions, Activity limitations: Decreased functional mobility ; Decreased balance;Decreased coordination;Decreased endurance;Decreased strength;Decreased posture  Assessment: Pt presents with decreased balance, proprioception, coordination, and overall functional mobility related to recent CVA. Pt will benefit from skilled PT services to address the above stated deficits and promote safe return to the home environment. Treatment Diagnosis: decreased balance, coordination, and functional mobiilty  Prognosis: Fair  Decision Making: Medium Complexity  PT Education: Goals;PT Role;Plan of Care;Transfer Training;Orientation  Patient Education: Pt will require reinforcement  Barriers to Learning: cognition  REQUIRES PT FOLLOW UP: Yes  Activity Tolerance  Activity Tolerance: Patient limited by fatigue;Patient limited by endurance; Patient limited by cognitive status       Patient Diagnosis(es): CVA. has a past medical history of Dementia ([address] HTN (hypertension), Hyperlipidemia, and PONV (postoperative nausea and vomiting). has a past surgical history that includes Knee arthroscopy; Colonoscopy; eye surgery (Right, 2014); Cystoscopy (2018); and TURP (2018). Restrictions  Restrictions/Precautions  Restrictions/Precautions: Fall Risk, Modified Diet (soft and bite sized, no straws, thins)  Required Braces or Orthoses?: No  Position Activity Restriction  Other position/activity restrictions: 81year old male with a history of CAD, dementia, PVD who was admitted to [facility] on  with left-sided weakness.   He was recently evaluated at this facility for an episode of syncope. Work-up revealed a right watershed ischemic stroke in the right frontal and parietal regions. He was initiated on Plavix in addition to aspirin and statin. A1c 5.3. LDL 88. Echo was unremarkable for an embolic source. He was evaluated by therapy and suggested to continue in an inpatient setting prior to returning home. He has no current complaints today. He is currently sitting up with therapy and having intermittent episodes of increased respirations with decreased heart rate. Vision/Hearing  Vision: Within Functional Limits  Hearing: Within functional limits     Subjective  General  Chart Reviewed: Yes  Patient assessed for rehabilitation services?: Yes  Additional Pertinent Hx: dementia, HTN, DM  Response To Previous Treatment: Not applicable  Family / Caregiver Present: No  Referring Practitioner: Dr Juana Del Angel  Referral Date : 06/23/21  Diagnosis: CVA  Follows Commands: Impaired  Subjective  Subjective: Pt in bed on arrival.  Agreeable to therapy session. Pain Screening  Patient Currently in Pain: Denies  Vital Signs  Patient Currently in Pain: Denies       Orientation  Orientation  Overall Orientation Status: Impaired  Orientation Level: Disoriented to situation;Oriented to time;Oriented to place; Disoriented to person (able to state hospital, but unaware of which one)  Social/Functional History  Social/Functional History  Lives With: Family (per OSH chart, pt lives with grandson)  Type of Home: House  Home Layout: Two level, Performs ADL's on one level  Home Access: Stairs to enter with rails  Entrance Stairs - Number of Steps: 7 RYAN, pt's family considering installing a ramp  Bathroom Shower/Tub: Walk-in shower  Bathroom Toilet: Standard  Bathroom Equipment: Shower chair  Receives Help From: Family  ADL Assistance: Independent  Homemaking Assistance: Needs assistance (daughter completes cooking and cleaning)  Homemaking Responsibilities: No  Ambulation Assistance: Independent  Transfer Assistance: Independent  Active : Yes  Mode of Transportation: Car  Occupation: Retired  Type of occupation: orthopedic surgeon  Leisure & Hobbies: woodworking  IADL Comments: Family reports that pt is able to heat up meals in microwave-family checks in on him periodically throughout the day. Family manages cleaning, laundry, finances. Intermittent confusion at baseline  Additional Comments: Pt's daughter reports that at baseline he ambulates w/o device. Pt is able to navigate 14 stairs w/ a rail independently. Pt does not require assistance w/ dressing or bathing. Pt has assistance for iADL tasks d/t dementia at baseline. Cognition   Cognition  Overall Cognitive Status: Exceptions  Arousal/Alertness: Delayed responses to stimuli  Following Commands: Inconsistently follows commands  Attention Span: Difficulty dividing attention  Memory: Decreased recall of biographical Information;Decreased short term memory;Decreased long term memory;Decreased recall of recent events  Safety Judgement: Decreased awareness of need for safety;Decreased awareness of need for assistance  Problem Solving: Decreased awareness of errors;Assistance required to identify errors made;Assistance required to correct errors made  Insights: Not aware of deficits  Initiation: Requires cues for all  Sequencing: Requires cues for all    Objective     Observation/Palpation  Posture: Poor  Body Mechanics: Forward flexed posture, rounded shoulders.  Heavy posterior lean, L lateral push    PROM RLE (degrees)  RLE PROM: WFL  AROM RLE (degrees)  RLE AROM: WFL  PROM LLE (degrees)  LLE PROM: WFL  AROM LLE (degrees)  LLE AROM : WFL  Strength RLE  Comment: unable to formally assess due to cognition  Strength LLE  Comment: unable to formally assess due to cognition  Tone RLE  RLE Tone:  (resistance to movement, but able to break for full ROM)  Tone LLE  LLE Tone:  (resistance to movement, but able to break for full ROM)  Motor Control  Gross Motor?: Exceptions  Comments: unable to formally assess due to decreased cognition  Sensation  Overall Sensation Status:  (unable to assess due to cognition)  Bed mobility  Rolling to Left: Dependent/Total (maxA of 2)  Rolling to Right: Dependent/Total (maxA of 2)  Supine to Sit: Dependent/Total (maxA of 2)  Sit to Supine: Dependent/Total (maxA of 3)  Transfers  Sit to Stand: Dependent/Total (maxA of 2)  Stand to sit: Dependent/Total (maxA of 2)  Bed to Chair: Dependent/Total (maxi-move)  Lateral Transfers: Dependent/Total (maxi-move)  Car Transfer: Unable to assess (not safe at this time)  Ambulation  Ambulation?: No (not safe at this time)  Stairs/Curb  Stairs?: No (not safe at this time)     Balance  Posture: Poor  Sitting - Static: Poor  Sitting - Dynamic: Poor  Standing - Static: Poor  Standing - Dynamic: Poor  Comments: Pt required maxA to sit EOB with L lateral lean as well as L lateral push noted as well as posterior lean with cervical flexion and rounded shoulders. Pt was able to initiate cervical extension, but unable to maintain. Plan   Plan  Times per week: 60 minutes a day 5 days a week  Current Treatment Recommendations: Strengthening, Transfer Training, Endurance Training, Neuromuscular Re-education, Patient/Caregiver Education & Training, ROM, Wheelchair Mobility Training, Manual Therapy - Soft Tissue Mobilization, Equipment Evaluation, Education, & procurement, Balance Training, Gait Training, Functional Mobility Training, Stair training, Safety Education & Training, Positioning  Safety Devices  Type of devices:  All fall risk precautions in place, Call light within reach, Chair alarm in place, Left in chair    G-Code       OutComes Score                                                  AM-PAC Score             Goals  Short term goals  Time Frame for Short term goals: 1-2 weeks  Short term goal 1: Pt will perform all bed mobility with maxA of 1  Short term goal 2: Pt will perform SPT with maxA of 1  Short term goal 3: Pt will ambulate 8' with LRAD with maxA of 1  Short term goal 4: Pt will negotiate 1 step with LRAD and maxA of 1  Long term goals  Time Frame for Long term goals : 2-3 weeks  Long term goal 1: Pt will perform all bed mobility with CGA  Long term goal 2: Pt will perform all transfers with CGA  Long term goal 3: Pt will ambulate 48' with LRAD CGA  Long term goal 4: Pt will negotiate 12 steps with kapil  Patient Goals   Patient goals : pt unable to assess       Therapy Time   Individual Concurrent Group Co-treatment   Time In       0730   Time Out       0830   Minutes       60        Timed Code Treatment Minutes:  45    Total Treatment Minutes:  [address] [place] [id number]

## 2021-06-24 NOTE — PROGRESS NOTES
Admission Period/Goal QM Codes for Chiquis Hopson. QM Admit Code Goal Code   Eating 3 6   Oral Hygiene 1 6   Toileting Hygiene 1 4   Shower/Bathing 1 4   UB Dressing 2 6   LB Dressing 1 5   Putting on/off Footwear 1 5   Rolling Left and Right 1 4   Sit To Lying 1 4   Lying to Sitting on Bedside 1 4   Sit to Stand 1 4   Chair/Bed to Chair Transfer 1 4   Toilet Transfers 1 -   Car Transfers 88 4   Walk 10 Feet 88 4   Walk 50 Feet with Two Turns 88 4   Walk 150 Feet 88 4   Walk 10 Feet on Uneven Surfaces 88 4   1 Step (Curb) 88 4   4 Steps 88 4   12 Steps 88 4   Picking up Object from Floor 88 4   Wheel 50 Feet with 2 Turns 9 -   Type - -   Wheel 150 Feet 9 -   Type - -       Following discussion at the Quality Measure Huddle, the above codes were determined to be the patient's usual performance at admission.     OT:  Luis River OTR/L, MOT #[id number], 6/29/21, 1139     PT:  NICOLE ZuñigaT [id number] 6/28/2021 1241     RN:  Alxe Cobian RN BSN 6/26/21 @ 1708     ST:  Quoc Malone [facility]-SLP 6/28/2021 1150     :  Padmini Mora PT, [place] [id number]  6/29/21  2:59 PM

## 2021-06-24 NOTE — PLAN OF CARE
Problem: Falls - Risk of:  Goal: Will remain free from falls  Description: Will remain free from falls  6/23/2021 2258 by Jayla Fuller RN  Outcome: Ongoing   Education Provided as followed:  \"Family/Patient made aware of the tailored interventions falls plan using the TIPS TOOL.    Problem: Injury - Risk of, Physical Injury:  Goal: Will remain free from falls  Description: Will remain free from falls  6/23/2021 2258 by Jayla Fuller RN  Outcome: Ongoing

## 2021-06-24 NOTE — PROGRESS NOTES
Occupational Therapy   Occupational Therapy Initial Assessment  Date: 2021   Patient Name: Eloisa Issa  MRN: [id number]     : 1935    Date of Service: 2021    Discharge Recommendations:  Continue to assess pending progress, Patient would benefit from continued therapy after discharge  OT Equipment Recommendations  Equipment Needed: No  Other: Continue to assess pending pt progress    Assessment   Performance deficits / Impairments: Decreased functional mobility ; Decreased endurance;Decreased coordination;Decreased posture;Decreased ADL status; Decreased ROM; Decreased strength;Decreased balance;Decreased vision/visual deficit; Decreased high-level IADLs;Decreased safe awareness;Decreased cognition;Decreased fine motor control  Assessment: 81 y.o. male presents w/ the above deficits which are impacting his occupational performance. Pt would benefit from continued therapy during inpatient stay in order to maximize safety and independence upon discharge. Treatment Diagnosis: Multiple performance deficits s/p CVA  Prognosis: Fair  Decision Making: High Complexity  OT Education: OT Role;Plan of Care;ADL Adaptive Strategies;Transfer Training  Patient Education: Pt is not an independent learner  Barriers to Learning: Cognition, lethargy  REQUIRES OT FOLLOW UP: Yes  Activity Tolerance  Activity Tolerance: Patient Tolerated treatment well;Treatment limited secondary to decreased cognition  Safety Devices  Safety Devices in place: Yes  Type of devices: Nurse notified; Left in chair;Chair alarm in place;Call light within reach           Patient Diagnosis(es): CVA   has a past medical history of Dementia ([facility] HTN (hypertension), Hyperlipidemia, and PONV (postoperative nausea and vomiting). has a past surgical history that includes Knee arthroscopy; Colonoscopy; eye surgery (Right, 2014); Cystoscopy (2018); and TURP (2018).     Treatment Diagnosis: Multiple performance deficits s/p cleaning)  Homemaking Responsibilities: No  Ambulation Assistance: Independent  Transfer Assistance: Independent  Active : Yes  Mode of Transportation: Car  Occupation: Retired  Type of occupation: orthopedic surgeon  Leisure & Hobbies: woodworking  IADL Comments: Family reports that pt is able to heat up meals in microwave-family checks in on him periodically throughout the day. Family manages cleaning, laundry, finances. Intermittent confusion at baseline  Additional Comments: Pt's daughter reports that at baseline he ambulates w/o device. Pt is able to navigate 14 stairs w/ a rail independently. Pt does not require assistance w/ dressing or bathing. Pt has assistance for iADL tasks d/t dementia at baseline. Objective   Vision: Within Functional Limits  Hearing: Within functional limits    Orientation  Overall Orientation Status: Impaired  Orientation Level: Oriented to place;Oriented to person;Disoriented to time;Disoriented to situation  Observation/Palpation  Posture: Poor  Body Mechanics: Forward flexed posture, rounded shoulders. Heavy posterior lean, L lateral push  Balance  Sitting Balance: Maximum assistance (Posterior lean/L lateral push)  Standing Balance: Dependent/Total  Standing Balance  Comment: Unable to achieve full stance w/ Max A X2  ADL  Grooming: Setup;Minimal assistance (oral care while seated EOB)  UE Bathing: Setup;Maximum assistance  LE Bathing: Setup;Maximum assistance  UE Dressing: Setup;Maximum assistance  LE Dressing: Setup;Dependent/Total  Toileting: Setup;Dependent/Total  Additional Comments: Pt incontinent of urine upon arrival. Doffed soiled brief/donned pants/underwear while rolling L/R in bed. Completed sponge bathing seated EOB. Second person assist present for sitting balance (pt w/ significant posterior lean/L push).  Use of sukhwinder to transfer pt to chair  Coordination  Movements Are Fluid And Coordinated: No  Coordination and Movement description: Right UE;Left UE;Decreased accuracy; Decreased speed;Gross motor impairments; Fine motor impairments  Quality of Movement Other  Comment: Tone versus motor planning. Pt resistive w/ some movement.  Intermittently able to follow commands to use LUE-appears to be difficulty w/ directions/motor planning versus hemiparesis     Bed mobility  Rolling to Left: Dependent/Total  Rolling to Right: Dependent/Total  Supine to Sit: Dependent/Total  Sit to Supine: Dependent/Total  Transfers  Sit to stand: Dependent/Total  Stand to sit: Dependent/Total  Transfer Comments: Unable to achieve full stance w/ Max A x2     Cognition  Overall Cognitive Status: Exceptions  Arousal/Alertness: Delayed responses to stimuli  Following Commands: Inconsistently follows commands  Attention Span: Difficulty dividing attention  Memory: Decreased recall of biographical Information;Decreased short term memory;Decreased long term memory;Decreased recall of recent events  Safety Judgement: Decreased awareness of need for safety;Decreased awareness of need for assistance  Problem Solving: Decreased awareness of errors;Assistance required to identify errors made;Assistance required to correct errors made  Insights: Not aware of deficits  Initiation: Requires cues for all  Sequencing: Requires cues for all  Cognition Comment: Increased processing time for verbal commands, increased lethargy this date  Perception  Overall Perceptual Status: Impaired  Unilateral Attention: Cues to maintain midline in sitting;Cues to attend to left side of body  Initiation: Hand over hand to initiate tasks  Motor Planning: Hand over hand to sequence tasks  Perseveration: Perseverates during ADLs     Sensation  Overall Sensation Status:  (unable to assess due to cognition)        LUE AROM (degrees)  LUE AROM : WFL  RUE AROM (degrees)  RUE AROM : WFL  LUE Strength  Gross LUE Strength: WFL  RUE Strength  Gross RUE Strength: WFL      Plan   Plan  Times per week: 60 min; 5-7x  Times per day: Daily  Current Treatment Recommendations: Strengthening, Patient/Caregiver Education & Training, Home Management Training, Equipment Evaluation, Education, & procurement, Balance Training, Neuromuscular Re-education, Functional Mobility Training, Endurance Training, Safety Education & Training, Self-Care / ADL      Goals  Short term goals  Time Frame for Short term goals: 1-2 weeks  Short term goal 1: Functional transfer for ADL completion w/ assist x1  Short term goal 2: Bathing w/ assist x1  Short term goal 3: LB dressing w/ assist x1  Short term goal 4: Toileting w/ assist x1  Short term goal 5: UB dressing w/ Min A  Long term goals  Time Frame for Long term goals : 3-4 weeks  Long term goal 1: Functional transfer for ADL completion w/ supervision  Long term goal 2: Bathing w/ supervision  Long term goal 3: LB dressing w/ supervision  Long term goal 4: Toileting w/ supervision  Long term goal 5: UB dressing w/ supervision  Patient Goals   Patient goals :  To go home       Therapy Time   Individual Concurrent Group Co-treatment   Time In 5504     374   Time Out 1100     830   Minutes 15     60        Timed Code Treatment Minutes:   60    Total Treatment Minutes:  [address] [facility], OT   [place] OTR/[place], [place] #[id number]

## 2021-06-24 NOTE — H&P
Patient: Jack Poe  [id number]  Date: 6/24/2021      Chief Complaint: Left sided weakness    History of Present Illness/Hospital Course:  81year old male with a history of CAD, dementia, PVD who was admitted to [facility] on 6/19 with left-sided weakness. He was recently evaluated at this facility for an episode of syncope. Work-up revealed a right watershed ischemic stroke in the right frontal and parietal regions. He was initiated on Plavix in addition to aspirin and statin. A1c 5.3. LDL 88. Echo was unremarkable for an embolic source. He was evaluated by therapy and suggested to continue in an inpatient setting prior to returning home. He has no current complaints today. He is currently sitting up with therapy and having intermittent episodes of increased respirations with decreased heart rate. Prior Level of Function:  Independent    Current Level of Function:  Pending     has a past medical history of Dementia ([facility] [address] HTN (hypertension), Hyperlipidemia, and PONV (postoperative nausea and vomiting). has a past surgical history that includes Knee arthroscopy; Colonoscopy; eye surgery (Right, 11/2014); Cystoscopy (04/04/2018); and TURP (04/26/2018). reports that he has never smoked. He has never used smokeless tobacco. He reports current alcohol use. He reports that he does not use drugs. family history is not contributory    Allergies: Patient has no known allergies.     Current Facility-Administered Medications   Medication Dose Route Frequency Provider Last Rate Last Admin    acetaminophen (TYLENOL) tablet 650 mg  650 mg Oral Q4H PRN Jc Bowen MD        enoxaparin (LOVENOX) injection 40 mg  40 mg Subcutaneous Daily Jc Bowen MD        aspirin chewable tablet 81 mg  81 mg Oral Daily Jc Bowen MD        atenolol (TENORMIN) tablet 25 mg  25 mg Oral Daily Jc Bowen MD        atorvastatin (LIPITOR) tablet 40 mg  40 mg Oral [facility]: negative for hallucinations, behavioral problems, confusion and agitation. All pertinent positives are noted in the HPI. Physical Examination:  Vitals:   Patient Vitals for the past 24 hrs:   BP Temp Temp src Pulse Resp SpO2 Height Weight   06/23/21 2000 113/75 99.2 °F (37.3 °C) Oral 73 18 94 %     06/23/21 1510 135/70 98.1 °F (36.7 °C) Oral 68 18  6' 2\" (1.88 m) 226 lb 8 oz (102.7 kg)   06/23/21 1456 135/70   68       06/23/21 1435    68       06/23/21 1400 135/70 98.1 °F (36.7 °C) Oral 68 18        Psych: Stable mood, normal judgement, normal affect   Const: No distress  Eyes: Conjunctiva noninjected, no icterus noted; pupils equal, round. HENT: Atraumatic, normocephalic; Oral mucosa moist  Neck: Trachea midline, neck supple. No thyromegaly noted  CV: Regular rate and Irregular rhythm, no murmur rub or gallop noted  Resp: Lungs clear to auscultation bilaterally, no rales wheezes or ronchi, no retractions. Respirations intermittently labored and then resolved. GI: Soft, nontender, nondistended. Normal bowel sounds. No palpable masses. Neuro: Alert, oriented to person, appropriate. Higher level cognitive deficits. No cranial nerve deficits appreciated. Sensation intact to light touch. Motor examination reveals: . Reflexes normal and symmetric. Skin: Normal temperature and turgor  MSK: No joint abnormalities noted. Ext: No significant edema appreciated. No varicosities.     Lab Results   Component Value Date    WBC 12.5 (H) 06/24/2021    HGB 13.6 06/24/2021    HCT 40.6 06/24/2021    MCV 87.7 06/24/2021     06/24/2021     No results found for: INR, PROTIME  Lab Results   Component Value Date    CREATININE 1.0 06/24/2021    BUN 21 (H) 06/24/2021     06/24/2021    K 3.8 06/24/2021     06/24/2021    CO2 24 06/24/2021     Lab Results   Component Value Date    ALT 7 (L) 06/17/2021    AST 18 06/17/2021    ALKPHOS 90 06/17/2021    BILITOT 0.4 2021       Most recent echocardiogram revealed   Transesophageal Echocardiogram Report  Name: Kj Johnson MD  : 1935 Age: 81 yrs  EPI: [id number] Gender: Male  Accession Number: [id number]  Order Number: [id number]  Account Number: [[id number]  Patient Location: [facility]  Study Date: 2021 10:35 AM    Summary:    The left ventricular wall motion is normal.  Left ventricular systolic function is normal. (LVEF>/=55%)  Overall left ventricular ejection fraction is estimated to be 55-60%. No left atrial clot detected. The mitral valve leaflets are mildly thickened in appearance. There is mild mitral regurgitation. The Aortic Valve leaflets appear sclerotic. Moderate atherosclerotic plaque(s) in the ascending aorta. A bubble study was performed. Reason for Study: Z01.89 Evaluate for Cardio-Embolic Source. Most recent imaging studies revealed   2021 11:39 AM EDT    Formatting of this note might be different from the original.  HISTORY: Neuro deficit(s), subacute LEFT LEG/ ARM WEAKNESS  COMPARISON: 2021   TECHNIQUE: Multiplanar multisequence MR images of the brain  NOTE: If there are questions about the content of this report, please contact [address] [place] [address] radiology by calling [phone]    FINDINGS:  BRAIN PARENCHYMA: Acute infarct is seen in a watershed distribution in the right frontal and parietal convexities. Moderate periventricular T2 hyperintensity is present. VENTRICLES/SULCI: Unremarkable. No hydrocephalus or midline shift  EXTRA-AXIAL SPACES: Unremarkable  FLOW VOIDS: Unremarkable. Normal signal flow voids in the larger intracranial vessels  PITUITARY: Unremarkable   PARANASAL SINUSES/MASTOIDS: Mild pansinus mucosal thickening  BONES: Unremarkable  OTHER: None     IMPRESSION      Acute right hemispheric watershed infarcts  Moderate Periventricular Chronic Small Vessel Ischemic Change     The above laboratory data have been reviewed.      The above imaging data have been reviewed. The above medical testing have been reviewed. Body mass index is 29.08 kg/m². Barriers to Discharge: Decreased cognition, left hemiparesis, decreased safety, decreased endurance, ADLs    POST ADMISSION PHYSICIAN EVALUATION  The patient has agreed to being admitted to our comprehensive inpatient  rehabilitation facility consisting of at least 180 minutes of therapy a day,  5 out of 7 days a week. The patient/family has a good understanding of our discharge process. The  patient has potential to make improvement and is in need of at least two of  the following multidisciplinary therapies including but not limited to  physical, occupational, respiratory, and speech, nutritional services, wound care, and prosthetics and orthotics. Given the patients complex condition  and risk of further medical complications, rehabilitation services cannot be  safely provided at a lower level of care such as a skilled nursing facility. I have compared the patients medical and functional status at the time of the  preadmission screening and the same on this date, and there are no significant changes except as documented below in the assessment and plan. By signing this document, I acknowledge that I have personally performed a  full physical examination on this patient within 24 hours of admission to  this inpatient rehabilitation facility and have determined the patient to be  able to tolerate the above course of treatment at an intensive level for a  reasonable period of time. I will be completing a detailed individualized  Plan of Care for this patient by day four of the patients stay based upon the  Preadmission Screen, this Post-Admission Evaluation, and the therapy  evaluations. Assessment and Plan:  Right frontal and parietal watershed infarcts: ASA, statin. Plavix for 21 days. PT/OT/SLP. Event monitor mentioned in discharge notes but no monitor noted to be with the patient today.   We will follow up and ensure placement given his breathing episodes below. Intermittent tachypnea: HR decreased during episodes. Monitor    CAD: ASA, statin     HTN: atenolol     Dementia: SLP    Urinary retention: flomax, proscar. Recently required cm placement    Bowels: Per protocol  Bladder: Per protocol   Sleep: Trazodone provided prn. Pain: tylenol, tramadol   DVT PPx: Lovenox   ELOS: 17-21    Quinten Jacome MD 6/24/2021, 8:34 AM     * This document was created using dictation software. While all precautions were taken to ensure accuracy, errors may have occurred. Please disregard any typographical errors.

## 2021-06-24 NOTE — PROGRESS NOTES
Facility/Department: [facility] ACUTE REHAB UNIT  Initial Assessment    Patient: Alex Payne   : 1935   MRN: [id number]      Evaluation Date: 2021      Medical Diagnosis Information:  has Essential hypertension; Hyperlipidemia; Palpitations; Family history of premature CAD; Shoulder pain; Knee pain; Late onset Alzheimer's disease without behavioral disturbance ([address] Acute renal failure (ARF) ([facility] Benign prostatic hyperplasia (BPH) with post-void dribbling; Near syncope; Dementia due to Alzheimer's disease ([facility] and Acute embolic stroke ([facility] on their problem list.                                      ARU H&P: 81year old male with a history of CAD, dementia, PVD who was admitted to [facility] on  with left-sided weakness. He was recently evaluated at this facility for an episode of syncope. Work-up revealed a right watershed ischemic stroke in the right frontal and parietal regions. He was initiated on Plavix in addition to aspirin and statin. A1c 5.3. LDL 88. Echo was unremarkable for an embolic source. He was evaluated by therapy and suggested to continue in an inpatient setting prior to returning home. He has no current complaints today. He is currently sitting up with therapy and having intermittent episodes of increased respirations with decreased heart rate. Recent Chest xray completed  OSH: No significant abnormality      Recent MRI Brain completed 21 at OSH: Acute right hemispheric watershed infarcts    Moderate Periventricular Chronic Small Vessel Ischemic Change       History/Prior Level of Function:   Living Status: Per pt's daughter Omid Carson) pt lives with his grandson (22years old). Esmer Li reports plan is for her and her family to move into his place to provide more assistance.    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 Vs reduced processing speed []Hearing aids  R/L  Vision:    [] WFL       []Visual deficits      [] Glasses  - Pt denies visual deficits or using readers     PAIN:  Pain Scale: Pt denied   Type: []Constant   []Intermittent  []Radiating []Localized []other:  Pain Intervention:                DYSPHAGIA BEDSIDE SWALLOW EVALUATION     Prior Dysphagia History: Per pt's daughter report Ean His) pt has been more choked up with eating/ drinking since CVA. Pt's daughter does not remember speech therapy seeing him in at OSH.  Denied any previous swallowing difficulties     Patient Complaint: Pt denies difficulty     Patient Positioning: Upright in chair    Respiratory Status:   [x]Room Air (due to tachypnea took pt's O2 stats during evaluation -- pt's O2 stats 96% with eating and 94% SPO2 following meal and at end of evaluation with resting HR between 79-84/min    []O2 via nasal cannula   []Other:    Current Diet:   Current Liquid:     [x]Regular    []Dysphagia III/Soft & Bite-Sized     []Dysphagia II/Minced & Moist    []Dysphagia I/Pureed  []NPO   [x]Thin  []Nectar thick/Mildly thick   []Honey thick/Mildly thick   []NPO  []Butler Water  []Other:       Dentition:  [x]Adequate  []Dentures Top  []Dentures Bottom  []Missing Many Teeth  []Edentulous  []Other:    Baseline Vocal Quality:  []Normal  [x]Dysphonic   []Aphonic   [x]Hoarse  [x]Wet  []Weak  []Other:    Volitional Cough:  []Strong  []Weak  []Wet  []Absent  []Congested  [x]Other: Did not follow directions to elicit     Volitional Swallow:   []Absent   []Delayed     []Adequate [x]Required use of drink  / pt not consistently following directions (unable to determine if due to pt being unable to produce vs inability to comprehend and initiate)     Oral Mechanism Exam:  []WFL [x]Mild   [x] Moderate  []Severe  [x]To be assessed -- unable to fully be assessed due to pt's inability to consistently follow commands and minimal/ reduced effort in completing directions    []Labial ROM   []Labial Symmetry   []Labial Strength   []Labial Sensation   []Labial Coordination    []Lingual ROM  []Lingual Symmetry  [x]Lingual Strength reduced on both sides    []Lingual Sensation    []Lingual Coordination    []Velum    []Mandible  []Gag    Oral Phase: []WFL [x]Mild   [x] Moderate  []Severe  []To be assessed   [x]Impaired Mastication: prolonged/ reduced mastication    []Spillage Left:   []Spillage Right:  []Pocketing Left:   []Pocketing Right:   [x]Decreased Anterior to Posterior Transit:   [x]Suspected Premature Bolus Loss:   []Lingual/Palatal Residue:   []Other:     Pharyngeal Phase: (suspected) []WFL [x]Mild   [x] Moderate  []Severe  [x]To be assessed -- would benefit from a completion of an instrumental assessment    [x]Delayed Swallow:   [x]Decreased Laryngeal Elevation: via manual palpation    []Absent Swallow:  [x]Wet Vocal Quality: ongoing with and without po (did not clear with cued coughs/ reswallows)    []Throat Clearing-Immediate:   [x]Throat Clearing-Delayed: following thins via straw in succession with pt producing wet vocal quality and delayed throat clear/ coughing with cued phonation    []Cough-Immediate:   [x]Cough-Delayed: following thins via straw in succession with pt producing wet vocal quality and delayed coughing with cued phonation   []Change in Vital Signs:  [x]Other: intermittent post belching noted following trials       Dysphagia Impressions:  SLP assisted pt with breakfast meal set up due to L hemiparesis.  Pt impulsive with taking large consecutive bites of food prior to clearing oral cavity requiring verbal/ tactile cues (of SLP placing fork down in between bites). Pt continued with increased rate but was able to improve self monitoring with ongoing verbal cues. Pt's vocal quality rough, gravely, wet at baseline without po. Unable to assess change in vocal quality throughout assessment due to baseline dysphonic voice. Pt presents with mild-moderate oropharyngeal dysphagia characterized by reduced bolus control, prolonged mastication, suspected premature bolus loss to pharynx, varying delays of swallow initiation with reduced hyolaryngeal excursion via manual palpation. Pt with tachypnea throughout assessment with intermittent rapid breathing prior to, during and following po solids. Pt's SPO2 remained at mid-upper 90's throughout assessment. Thins via straw in succession revealed increase in swallow delay with pt producing further wet vocal quality and delayed throat clear and cough with cued phonation. Pt appeared with improved tolerance of thins via cup (consumed 4+ oz of cold/ hot liquids via cup) in combination with other textures with no overt clinical s/s of aspiration, however, silent aspiration cannot conclusively be ruled out at bedside. Recommend diet downgrade at this time with 1:1 feed to encourage use of compensatory swallow strategies and closely monitor diet and to hold NPO pending swallow re-evaluation/ completion of MBS if difficulty is noted . Pt would benefit from completion of instrumental assessment to further assess the pharyngeal phase of the swallow in the future pending ongoing evaluation. Addend Note: Following lunch meal PT reported increased coughing coming from pt's room. RN reported pt with occasional coughing throughout the day. Attempted to see pt to further assess swallow fx at bedside, however, pt politely declined any offered po.  Notifed RN to keep a close eye on pt during dinner meal/ make pt a 1:1 feed and to hold pt NPO if difficulty is noted pending swallow reassessment. Eating Assistance:  []Independent  []Setup or clean-up assistance   [] Supervision or touching assistance   [] Partial or moderate assistance   [x] Substantial or maximal assistance (for safety with compensatory swallow strategies)  [] Dependent     Recommended Diet and Intervention:  Diet Solids Recommendation:   Liquid Consistency Recommendation:   Recommended form of Meds:   []Regular    [x]Dysphagia III/Soft & Bite-Sized     []Dysphagia II/Minced & Moist    []Dysphagia I/Pureed  []NPO   [x]Thin (NO STRAWS)   []Nectar thick/Mildly thick   []Honey thick/Mildly thick   []NPO  []Butler Water  []Other:   []Whole in water  [x]Meds in puree  []Meds crushed in puree  []Via alternative means of nutrition  []Other:       Dysphagia Therapeutic Intervention:  []  Bolus control Exercises  []  Oral Motor Exercises  []  Killian Gauss Water Protocol  []  Thermal Stimulation  []  Oral Care    []  Vital Stim/NMES  []  Laryngeal Exercises  []  Patient/Family Education  []  Pharyngeal Exercises  []  Therapeutic PO trials with SLP  []  Diet tolerance monitoring  []  Other:     Compensatory Swallowing Strategies:  []  Alternate Solids/Liquids  []  No straws   [] Lingual sweeps  []  Check for pocketing of food L []  Assist feed  [] Eat/feed Slowly  []  Check for pocketing of food R []  Chin tuck   [] Remain upright 30-45 min  []  Effortful Swallows   []  External pacing  [] Total feed  []  Liquids by spoon only  []  Small bites/sips  [] Other:   []  Upright as possible with all PO []  Swallow 2 times per bite/sip    SHORT TERM DYSPHAGIA GOALS  Short-term Goals  Goal 1: Pt will tolerate recommended diet and advanced trials with use of compensatory swallow strategies provided with < mod cues with no overt clinical s/s of aspiration or noted difficulty.     SPEECH LANGUAGE COGNITIVE ASSESSMENT:     Patient complaint: When asked what the stroke impacted pt stated \"the back of my brain\" when asked to determine what he was having difficulty with he said coming up with certain words. Denied changes in speech. Hx provided by Family: All pt's hx provided by daughter Carlos Fair). Pt's daughter reported pt was not formally dx with dementia but short term memory loss ~ 3 years ago. Reports he was taking medication for his memory but they stopped it due to lethargy and hallucinations. Pt's daughter reports pt's memory has gotten worse in the last 1.5 years and they would have to repeat a lot of information to him. She reported he stopped driving at that time due to getting lost. Pt's daughter reports he has leveled off and hasn't gotten any worse. Pt's daughter reports his voice/ memory/ cognition seemed about the same (maybe a little worse since the CVA) but it was hard to tell because he had intermittent reduced alertness in acute hospital. Pt's other daughter that was visiting today reported change in mental status from a few days ago to now (MD aware).      Oral Mechanism Exam:  []WFL [x]Mild   [x] Moderate  []Severe  [x]To be assessed -- unable to fully be assessed due to pt's inability to consistently follow commands and minimal/ reduced effort in completing directions    []Labial ROM   []Labial Symmetry   []Labial Strength   []Labial Sensation   []Labial Coordination    []Lingual ROM  []Lingual Symmetry  [x]Lingual Strength reduced on both sides    []Lingual Sensation    []Lingual Coordination    []Velum    []Mandible  []Gag    COMPREHENSION  Auditory Comprehension: []WFL []Mild   [x] Moderate  [x]Severe  []To be assessed   []Yes/No Questions   []Basic questions   []Complex Questions   []One Step Commands   [x]Two Step Commands   [x]Multi-Step Commands   [x]Complex/Abstract Commands    []Common Pictures/Objects   []L/R Discrimination  [x]Conversation  []Other: significantly delayed processing speed/ required ongoing repetition of basic questions/ commands     Reading Comprehension: [x]WFL []Mild   [] Moderate []Severe  [x]To be assessed   []Not assessed / pt does not participate in functional reading   []Scanning/Tracking Impairment   []Words Impairment:  [x]Phrase Impairment: WFL for 3 functional sentences / will continue to further assess   []Paragraph Impairment:     EXPRESSION  Primary Mode of Expression: Verbal   Verbal Expression: []WFL []Mild   [x] Moderate  []Severe  []To be assessed   [x]Initiation   []Repetition   []Automatic Speech   []Confrontation   []Convergent   []Divergent   []Responsive   [x]Conversation -- minimal during evaluation   [x]Other: requires further assessment / limited due to minimal verbal output during evaluation      Written Expression: []WFL []Mild   [] Moderate  []Severe  [x]To be assessed   []Not assessed; pt does not participate in functional writing  Dominant Hand: []Left   [x] Right   []Legibility Impairment:    []Dictation Impairment:    []Trace Impairment:         []Copy Impairment:        []Self-formulation Impairment:         Pragmatics/Social Functioning: []WFL []Mild   [x] Moderate  []Severe  []To be assessed   [x]Eye Contact    []Topic Maintenance    []Turn Taking   [x]Affect - flat      [x]Monotone  []Emotionally labile   []Other:      MOTOR SPEECH  Motor Speech: []WFL []Mild   [x] Moderate  []Severe  []To be assessed   []Apraxia   [x]Dysarthria   [x]Intelligibility: at the short phrase / sentence level      VOICE  Voice: []WFL []Mild   [x] Moderate  []Severe  []To be assessed   [x]Breath Support   []Aphonic   []Breathy   [x]Harsh     [x]Hoarse  []High pitched for age and sex   []Hypernasal Resonance   []Hyponasal Resonance   []Low pitch for age and sex  []Spastic   []Weak   [x]Dysphonic - wet vocal quality    [x]Vocal Intensity - low/ gravely    Maximum Phonation Time: < 1 second hold    Conversational Loudness: Reduced with tachypnea    - Reduced breath support - counted to 10 on one breath (rapid rate with reduced precision)   - Reading fx phrases (3 short phrases) - poor Situations   []Routine Tasks   [x]Unable to self-monitor and self-correct Consistently    [x]Insight   [x]Impulsive   [x]Flexibility of Thought  []Comment:    ADDITIONAL ASSESSMENT:  Attempted to complete [facility] Mental Status (SLUMS) Examination with pt (unable to complete due to reduced initiation/ poor verbal responsiveness/ difficulty and time constraints). Will complete in its entirety as pt is able to tolerate. Orientation 0/3 ABHINAV > 1 day, year did not improve with f/2, disoriented to state [facility])    Working Memory 0/5 x3 Unable to say after provided list of 5, required segmentation of each word    Basic Calculations 1/3 Required repetition of the word problems    Divergent Naming 0/3 Pt named 2/15+ animals in 1 minute   Short term memory DNT    Mental Manipulation DNT    Executive function task (clock drawing) DNT    Auditory comprehension of 1 step directions DNT    Auditory comprehension of short detailed paragraph DNT      Unable to complete scoring due to not completing entirety of cognitive screen. - 2 step body commands - 1/2   - 3 step body commands - 0/2     Impressions   Speech Therapy Diagnosis  Cognitive Diagnosis: Pt presents with severe cognitive linguistic deficits due to reduced orienation, reduced processing speed, reduced insight into current situation, reduced immediate/ working/ short term memory with fx problem solving difficulty. Per chart review pt with baseline dementia, did not complete IADLs at home prior to CVA but was able to be safely left alone with daily check ins from family. Aphasia Diagnosis: Significantly reduced processing speed/ difficulty following multi-step commands. Unable to discern if due to pt being [place] vs reduced auditory comprehension/ initiation, requires further assessment. Pt c/o word finding diffiuclty, pt with minimal verbal output during evaluation, requires ongoing assessment for goal generation.   Speech Diagnosis: Moderate dysarthria/ dysphonia, pt with intermittent periods of tachypnea and reduced breath support impacting intelligibility at the short phrase/ sentence level with reduced articulatory precision, pt with hoarse, gravely wet vocal quality     Treatment Diagnosis:  Speech-Language  []Expressive Aphasia  []Mild   [] Moderate  []Severe    []Receptive Aphasia  []Mild   [] Moderate  []Severe    [x]Word Finding Difficulty []Mild   [] Moderate  []Severe   - Requires further assessment   [x]Dysarthria   []Mild   [x] Moderate  []Severe   - Due to reduced breath support/ tachypnea   []Apraxia of Speech  []Mild   [] Moderate  []Severe   []Dysfluency   []Mild   [] Moderate  []Severe   []Other:   []Mild   [] Moderate  []Severe    Voice  [x]Dysphonia   []Mild   [x] Moderate  []Severe    []Aphonia   []Mild   [] Moderate  []Severe    []Hypophonia   []Mild   [] Moderate  []Severe    []Other:   []Mild   [] Moderate  []Severe    Cognitive-Linguistic  [x]Cognitive-Linguistic Deficits []Mild   [] Moderate  []Severe     []Suspected; unable to fully assess at this time   Dysphagia  [x]Oral Dysphagia  [x]Mild   [x] Moderate  []Severe    [x]Pharyngeal Dysphagia [x]Mild   [x] Moderate  []Severe    []Other:   []Mild   [] Moderate  []Severe      Prognosis/Rehab Potential:      []Excellent   [x]Good    [x]Fair   []Poor    Tolerance of evaluation/treatment:    []Excellent   []Good    [x]Fair   []Poor    PLAN:    SLP treatment warranted:    [x] Yes  [] No      Frequency/Duration: 60 minutes/day; 5 days per week, as tolerated, until goals met, or discharged from ARU:    Barriers to/and or personal factors that will affect rehab potential:              []Age              []Motivation/Lack of Motivation                        [x]Co-Morbidities              [x]Cognitive Function, education/learning barriers              []Environmental, home barriers              [x]Physical limitations   []past ST/medical experience  []Pain   [x]other: Reduced initiation Discharge Recommendations:   [] Home  [] Home with 24/7 supervision/assistance  [] Home with intermittent supervision   [] Timothy Royal   [x] TBD     EDUCATION:   Provided education regarding role of SLP, results of assessment, recommendations and general speech pathology plan of care. [] Pt verbalized understanding and agreement   [] Pt requires ongoing learning   [x] No evidence of comprehension     GOALS:    Short Term Speech/Language/Cognitive Goals:   Short-term Goals  Goal 1: Pt will respond and/or initiate action upon verbal prompt within 3 seconds in 4 out of 5 opportunities without repetition. Goal 2: Pt will use visual aids/ strategies to state orientation to time, place, situation with 100% accuracy across 3 consecutive sessions. Goal 3: Pt will improve oral motor function, articulatory precision and breath support in connected speech via graded tasks to >80% acc with use of speech strategies. Goal 4: Pt will improve verbal initiation and thought organization for extended utterances (i.e. related to personal hx/ recall of recent events, etc.) >75% acc. Goal 5: Patient will complete fx problem solving tasks and demonstrate insight into limitations and impact on daily functioning with >75% acc, min cues. If patient discharges prior to next visit, this note will serve as discharge. Time:   Time In 0830   Time Out  0930   Time Code 0   Total Minutes 60       Electronically signed by:    Avni Isaac [facility] #[id number] 6/24/2021 3:13 PM  Speech-Language Pathologist

## 2021-06-24 NOTE — PROGRESS NOTES
Nutrition Assessment     Type and Reason for Visit: Initial, Consult (New ARU admission)    Nutrition Recommendations/Plan:   Diet consistency per SLP     Nutrition Assessment:  Pt admitted s/p stroke. Currently receives Cardiac diet with po intake 51-75% of meals consumed thus far. SLP evaluating for swallowing concerns. Pt has hx dementia & is unable to recall any wt changes. No wt hx available. Skin is intact. Pt is at low risk for nutrition compromise @ this time. Malnutrition Assessment:  Malnutrition Status: No malnutrition    Nutrition Related Findings: LBM 6/23; no edema noted      Current Nutrition Therapies:    ADULT DIET; Regular; Low Fat/Low Chol/High Fiber/AMIE    Anthropometric Measures:  · Height: 6' 2\" (188 cm)  · Current Body Wt: 226 lb (102.5 kg)   · BMI: 29    Nutrition Diagnosis:   No nutrition diagnosis at this time    Nutrition Interventions:   Food and/or Nutrient Delivery:  Continue Current Diet  Nutrition Education/Counseling:  Education not indicated   Coordination of Nutrition Care:  Continue to monitor while inpatient, Swallow Evaluation    Goals:  po intake greater than 50% of meals       Nutrition Monitoring and Evaluation:   Behavioral-Environmental Outcomes:  None Identified   Food/Nutrient Intake Outcomes:  Food and Nutrient Intake  Physical Signs/Symptoms Outcomes:  Chewing or Swallowing, Weight     Discharge Planning:     Too soon to determine     Electronically signed by Jake Velez RD, LD on 6/24/21 at 8:59 AM EDT    Contact: [phone]

## 2021-06-25 PROCEDURE — 6370000000 HC RX 637 (ALT 250 FOR IP): Performed by: PHYSICAL MEDICINE & REHABILITATION

## 2021-06-25 PROCEDURE — 6360000002 HC RX W HCPCS: Performed by: PHYSICAL MEDICINE & REHABILITATION

## 2021-06-25 PROCEDURE — 92526 ORAL FUNCTION THERAPY: CPT

## 2021-06-25 PROCEDURE — 97530 THERAPEUTIC ACTIVITIES: CPT

## 2021-06-25 PROCEDURE — 1280000000 HC REHAB R&B

## 2021-06-25 PROCEDURE — 51798 US URINE CAPACITY MEASURE: CPT

## 2021-06-25 PROCEDURE — 97129 THER IVNTJ 1ST 15 MIN: CPT

## 2021-06-25 PROCEDURE — 97130 THER IVNTJ EA ADDL 15 MIN: CPT

## 2021-06-25 RX ADMIN — FINASTERIDE 5 MG: 5 TABLET, FILM COATED ORAL at 10:30

## 2021-06-25 RX ADMIN — TAMSULOSIN HYDROCHLORIDE 0.4 MG: 0.4 CAPSULE ORAL at 10:30

## 2021-06-25 RX ADMIN — TRAMADOL HYDROCHLORIDE 50 MG: 50 TABLET, FILM COATED ORAL at 21:14

## 2021-06-25 RX ADMIN — ASPIRIN 81 MG: 81 TABLET, CHEWABLE ORAL at 10:29

## 2021-06-25 RX ADMIN — TRAZODONE HYDROCHLORIDE 50 MG: 50 TABLET ORAL at 21:13

## 2021-06-25 RX ADMIN — CLOPIDOGREL BISULFATE 75 MG: 75 TABLET ORAL at 10:29

## 2021-06-25 RX ADMIN — ATORVASTATIN CALCIUM 40 MG: 40 TABLET, FILM COATED ORAL at 21:01

## 2021-06-25 RX ADMIN — ENOXAPARIN SODIUM 40 MG: 40 INJECTION SUBCUTANEOUS at 10:31

## 2021-06-25 RX ADMIN — ATENOLOL 25 MG: 50 TABLET ORAL at 10:30

## 2021-06-25 ASSESSMENT — PAIN SCALES - GENERAL
PAINLEVEL_OUTOF10: 0
PAINLEVEL_OUTOF10: 0
PAINLEVEL_OUTOF10: 7
PAINLEVEL_OUTOF10: 0

## 2021-06-25 ASSESSMENT — PAIN DESCRIPTION - LOCATION: LOCATION: PENIS

## 2021-06-25 ASSESSMENT — PAIN DESCRIPTION - PAIN TYPE: TYPE: ACUTE PAIN

## 2021-06-25 ASSESSMENT — PAIN DESCRIPTION - ONSET: ONSET: SUDDEN

## 2021-06-25 NOTE — CARE COORDINATION
Social Work Admission Assessment    Objective:  Spoke with patient's daughter and patient's sister to complete initial assessment and review role of  in rehab process. Patient was asleep. Oriented family to unit. Family states understanding of this. Current Home Situation:  Patient lives at home and adult grandson lives with patient. Patient's daughter RACHNA and her family ( and 4 children) are in the process of moving in with the patient to give patient support and care. Patient's daughter is a [address]. Pt's plans re:  Return to work/school/volunteer:  Patient is a retired orthopedic surgeon. Accessibility to community resources/transportation:  None. Patient was independent with ADLs and IADLs prior to hospitalization. Patient would like [facility] to be his provider. /Patient's family will provide transportation at discharge. Has pt experienced a recent loss or signigicant life event that would impact their care or ability to participate? _x_No  __Yes - Explain    Has pt ever been treated for emotional disorders? _x_No  __Yes--How does that affect current situation:    How does pt and family cope with stressful events and this hospitalization? Usually kelsey well. Special Problem Areas:  Patient will need a ramp when discharged. Discharge Plan: To home with needed supports. · Patient's PCP is Fuad Gomez. Patient last saw PCP 4 months ago. · Patient's uses [facility] mail order pharmacy and [facility]. · Patient has not had the COVID-19 Vaccine and does not want it. Impression/Plan:  Kandis Ahn (patient )is a 81year old male that has been admitted to ARU. Provided patient's daughter with this SW's contact information and made daughter aware of the team conference process. SW will continue to follow for support and discharge planning.      Electronically signed by WING Castañeda on 6/25/2021 at 4:05 PM

## 2021-06-25 NOTE — PROGRESS NOTES
Per OT, pt unable to move left side as well as yesterday and leaning to left more today. MD notified, orders received for UA.

## 2021-06-25 NOTE — PROGRESS NOTES
ACUTE REHAB UNIT  SPEECH/LANGUAGE PATHOLOGY      [x] Daily  [] Weekly Care Conference Note  [] Discharge    Patient:Reno Blackwell     :1935  BFS:[id number]  Room #: RWV-1576/1465-03   Rehab Dx/Hx: Acute embolic stroke [facility]) [M03.3]  Date of Admit: 2021      Precautions: falls and aspirations  Home situation: Per pt's daughter Zach Baron) pt lives with his grandson (22years old). Marilin Alonzo reports plan is for her and her family to move into his place to provide more assistance. ST Dx: Mild-moderate oropharyngeal dysphagia; Severe cognitive linguistic deficits; Moderate dysarthria / dysphonia    Daily Treatment Info:   Date: 2021     Tx session 1  Martínez Terrell M.S. [id number] [facility] session 2  Ziggy Purvis MA CCC-SLP   Pain Denied  None reported     Subjective     Pt initially in bed upon SLP entry to room. RN/PCA assisted to transfer pt to chair. Pt was alert throughout session but demonstrated minimal verbal responses. Reduced initiation noted. Pt sitting upright in chair at bedside. Inconsistent DRE during session, benefited from min verbal cues to maintain DRE. Mod-max cues for verbal responsiveness. Objective:  Goals     Pt will tolerate recommended diet and advanced trials with use of compensatory swallow strategies provided with < mod cues with no overt clinical s/s of aspiration or noted difficulty. Pt seen sitting upright in chair with breakfast meal (chopped sausage links, scrambled eggs, yogurt, and thin liquids via cup). SLP initially fed pt, but pt was then able to feed self when prompted. Pt demonstrates impulsive feeding behaviors--taking a bite of food in his mouth immediately after another bite, not clearing mouth between bites, gulping successive drinks of liquids, and taking large size bites. Pt was minimally responsive to verbal cues to reduce impulsivity and to utilize swallow strategies/precautions.  Despite impulsivity and large bite sizes, pt was able to [facility] use of clock, pt able to state ABHIANV, month, date, and year given verbal/visual cues. SLP provided education for use of external orientation aids, including clock and white board. - Pt verbalized ABHINAV given f:2  - Pt stated Elly Gregorio as December, did not improve given f:2  - Pt verbalized hospital name given visual cue from hospital handout  - When prompted to verbalize situation, pt did not respond despite choice and yes/no cues     Pt will improve oral motor function, articulatory precision and breath support in connected speech via graded tasks to >80% acc with use of speech strategies. Goal not targeted this session. Did not target this session, limited verbal expression. Pt will improve verbal initiation and thought organization for extended utterances (i.e. related to personal hx/ recall of recent events, etc.) >75% acc. Pt able to state name and  independently when asked without need for initiation cues. Pt responded to 2/3 [facility] questions re: education/career given one repetition of question. Verbalization of personal history with multimodality cues and question format hierarchy (open-ended, choice, yes/no):  - Pt with no response re: family  - Pt with no response re: former profession  - Pt verbalized \"[place]\" during discussion of living situation     Patient will complete fx problem solving tasks and demonstrate insight into limitations and impact on daily functioning with >75% acc, min cues. Goal not targeted this session.    Functional sorting and sequencing using playing cards:  - Pt correctly sorted cards by color given MOD initiation cues with extended response time  - Pt sequenced cards in chronological order given MOD initiation cues fading to MIN     Other areas targeted:  SLP provided MOD cues and environmental modifications (e.g., turning off TV, clearing bedside table, closing door) for improved sustained and selective attention     Education:   Educated pt re: role of SLP, rationale 7/16/21    Type of Total Treatment Minutes   Session 1   Session 2   Time In 0800 0900   Time Out 0830 0930   Timed Code Minutes  15 30   Individual Treatment Minutes  30 30   Co-Treatment Minutes      Group Treatment Minutes      Concurrent Treatment Minutes        TOTAL DAILY MINUTES:  60    Electronically Signed by     Treatment session #1:  HONG Perea Pathologist  6/25/2021 1:41 PM     Treatment session #2:  Guerline Munson Pathologist  Ileana [phone]

## 2021-06-25 NOTE — PROGRESS NOTES
Occupational Therapy  Facility/Department: [facility] ACUTE REHAB UNIT  Daily Treatment Note  NAME: Kaylee Rodgers  : 1935  MRN: [id number]    Date of Service: 2021    Discharge Recommendations:  Continue to assess pending progress, Patient would benefit from continued therapy after discharge  OT Equipment Recommendations  Equipment Needed: No  Other: Continue to assess pending pt progress    Assessment   Performance deficits / Impairments: Decreased functional mobility ; Decreased endurance;Decreased coordination;Decreased posture;Decreased ADL status; Decreased ROM; Decreased strength;Decreased balance;Decreased vision/visual deficit; Decreased high-level IADLs;Decreased safe awareness;Decreased cognition;Decreased fine motor control  Assessment: 81 y.o. male presents w/ the above deficits which are impacting his occupational performance. Pt would benefit from continued therapy during inpatient stay in order to maximize safety and independence upon discharge. Treatment Diagnosis: Multiple performance deficits s/p CVA  OT Education: OT Role;Plan of Care;ADL Adaptive Strategies;Transfer Training  Patient Education: Pt is not an independent learner  Barriers to Learning: Cognition, lethargy  REQUIRES OT FOLLOW UP: Yes  Activity Tolerance  Activity Tolerance: Treatment limited secondary to decreased cognition  Activity Tolerance: Pt w/ significant pushing behavior/rigidity during session significantly limiting pt's ability to complete therapy  Safety Devices  Safety Devices in place: Yes  Type of devices: Nurse notified; Left in chair;Chair alarm in place;Call light within reach         Patient Diagnosis(es): CVA      has a past medical history of Dementia ([facility] [address] HTN (hypertension), Hyperlipidemia, and PONV (postoperative nausea and vomiting). has a past surgical history that includes Knee arthroscopy; Colonoscopy; eye surgery (Right, 2014);  Cystoscopy (2018); and TURP (04/26/2018). Restrictions  Restrictions/Precautions  Restrictions/Precautions: Fall Risk, Modified Diet  Required Braces or Orthoses?: No  Position Activity Restriction  Other position/activity restrictions: 81year old male with a history of CAD, dementia, PVD who was admitted to [facility] on 6/19 with left-sided weakness. He was recently evaluated at this facility for an episode of syncope. Work-up revealed a right watershed ischemic stroke in the right frontal and parietal regions. He was initiated on Plavix in addition to aspirin and statin. A1c 5.3. LDL 88. Echo was unremarkable for an embolic source. He was evaluated by therapy and suggested to continue in an inpatient setting prior to returning home. He has no current complaints today. He is currently sitting up with therapy and having intermittent episodes of increased respirations with decreased heart rate.     Subjective   General  Chart Reviewed: Yes  Patient assessed for rehabilitation services?: Yes  Response to previous treatment: Patient with no complaints from previous session  Family / Caregiver Present: No  Diagnosis: CVA  Subjective  Subjective: Pt seated in recliner upon entry, increased lethargy, pushing behavior and rigidity  Vital Signs  Patient Currently in Pain: No     Objective    ADL  UE Dressing: Dependent/Total     Balance  Sitting Balance: Dependent/Total  Standing Balance: Dependent/Total  Standing Balance  Time: ~60 seconds total  Activity: 2 partial stands in parallel bars, pt w/ significant pushing behavior     Transfers  Sit to stand: Dependent/Total  Stand to sit: Dependent/Total  Transfer Comments: Dependent x2      Cognition  Overall Cognitive Status: Exceptions  Arousal/Alertness: Delayed responses to stimuli  Following Commands: Inconsistently follows commands  Attention Span: Difficulty dividing attention  Memory: Decreased recall of biographical Information;Decreased short term memory;Decreased long term memory;Decreased recall of recent events  Safety Judgement: Decreased awareness of need for safety;Decreased awareness of need for assistance  Problem Solving: Decreased awareness of errors;Assistance required to identify errors made;Assistance required to correct errors made  Insights: Not aware of deficits  Initiation: Requires cues for all  Sequencing: Requires cues for all  Cognition Comment: Increased processing time for verbal commands, increased lethargy this date      Additional activity: Attempted standing frame this date w/ assist x4-pt w/ significant pushing behavior and unable to achieve full stance. Completed weight shifting for midline orientation, pt able to shift w/ a visual cue but once tactile cue was introduced pt began pushing again. Pt completed dynamic cone reaching x20 reps, pt w/ decreased visual scanning speed throughout, CGA for reaching. Pt seated supported in chair during task d/t poor sitting balance.         Plan   Plan  Times per week: 60 min; 5-7x  Times per day: Daily  Current Treatment Recommendations: Strengthening, Patient/Caregiver Education & Training, Home Management Training, Equipment Evaluation, Education, & procurement, Balance Training, Neuromuscular Re-education, Functional Mobility Training, Endurance Training, Safety Education & Training, Self-Care / ADL    Goals  Short term goals  Time Frame for Short term goals: 1-2 weeks  Short term goal 1: Functional transfer for ADL completion w/ assist x1  Short term goal 2: Bathing w/ assist x1  Short term goal 3: LB dressing w/ assist x1  Short term goal 4: Toileting w/ assist x1  Short term goal 5: UB dressing w/ Min A  Long term goals  Time Frame for Long term goals : 3-4 weeks  Long term goal 1: Functional transfer for ADL completion w/ supervision  Long term goal 2: Bathing w/ supervision  Long term goal 3: LB dressing w/ supervision  Long term goal 4: Toileting w/ supervision  Long term goal 5: UB dressing w/ supervision  Patient Goals   Patient goals : To go home       Therapy Time   Individual Concurrent Group Co-treatment   Time In       0930   Time Out       1030   Minutes       60       . Timed Code Treatment Minutes:   60    Total Treatment Minutes:  [facility], [address] [place] [address] [address] [facility], [address] [place] [address] #[id number]

## 2021-06-25 NOTE — PROGRESS NOTES
Physical Therapy  Facility/Department: [facility] ACUTE REHAB UNIT  Daily Treatment Note  NAME: Jacinda Chahal  : 1935  MRN: [id number]    Date of Service: 2021    Discharge Recommendations:  24 hour supervision or assist, Home with Home health PT, S Level 3   PT Equipment Recommendations  Equipment Needed: Yes  Other: TBD with progress    Assessment   Body structures, Functions, Activity limitations: Decreased functional mobility ; Decreased balance;Decreased coordination;Decreased endurance;Decreased strength;Decreased posture  Assessment: Pt with increased balance deficits from previous date with increased L push and L lean. Pt with improvement in midline posture with target reaching. Pt will continue to benefit from skilled PT services to address deficits and promote return to baseline. Treatment Diagnosis: decreased balance, coordination, and functional mobiilty  Prognosis: Fair  PT Education: Goals;PT Role;Plan of Care;Transfer Training;Orientation  Patient Education: Pt will require reinforcement  Barriers to Learning: cognition  REQUIRES PT FOLLOW UP: Yes  Activity Tolerance  Activity Tolerance: Patient limited by fatigue;Patient limited by endurance; Patient limited by cognitive status     Patient Diagnosis(es): CVA. has a past medical history of Dementia ([facility] [address] HTN (hypertension), Hyperlipidemia, and PONV (postoperative nausea and vomiting). has a past surgical history that includes Knee arthroscopy; Colonoscopy; eye surgery (Right, 2014); Cystoscopy (2018); and TURP (2018). Restrictions  Restrictions/Precautions  Restrictions/Precautions: Fall Risk, Modified Diet (soft and bite sized, thins, no straws)  Required Braces or Orthoses?: No  Position Activity Restriction  Other position/activity restrictions: 81year old male with a history of CAD, dementia, PVD who was admitted to [facility] on  with left-sided weakness.   He was recently evaluated at this facility for an episode of syncope. Work-up revealed a right watershed ischemic stroke in the right frontal and parietal regions. He was initiated on Plavix in addition to aspirin and statin. A1c 5.3. LDL 88. Echo was unremarkable for an embolic source. He was evaluated by therapy and suggested to continue in an inpatient setting prior to returning home. He has no current complaints today. He is currently sitting up with therapy and having intermittent episodes of increased respirations with decreased heart rate. Subjective   General  Chart Reviewed: Yes  Additional Pertinent Hx: dementia, HTN, DM  Response To Previous Treatment: Patient with no complaints from previous session. Family / Caregiver Present: No  Subjective  Subjective: Pt in recliner on arrival.  Agreeable to therapy session. Pain Screening  Patient Currently in Pain: Denies  Vital Signs  Patient Currently in Pain: Denies       Orientation     Cognition      Objective      Transfers  Sit to Stand: Dependent/Total (maxA of 2 + Aaron of 1 in parallel bars with B knee block)  Stand to sit: Dependent/Total (maxA of 2 in parallel bars)  Lateral Transfers: Dependent/Total (maxi hubert for recliner to w/c transfer)  Ambulation  Ambulation?: No  Stairs/Curb  Stairs?: No     Balance  Posture: Poor  Sitting - Static: Poor  Sitting - Dynamic: Poor  Standing - Static: Poor  Standing - Dynamic: Poor  Comments: Pt with increased L and anterior lean in all positions this date with L push compared to  previous sessions. Comment: Performed transfer from recliner to w/c as documented above. Performed standing in parallel bars X 3 reps as documented above. Pt able to maintain standing ~20-30 seconds each with assist of 3. Pt with extension of LUE with L lean in standing and sitting. Attempted standing frame without success due to decreased trunk control and pushing behavior. Performed forward reaching with min-modA of 2.   Performed target reaching with RUE with improved initiation of R lean to target. Decreased command following in the anterior R location. Pt with decreased activation of RUE or RLE. RN notified. Pt remained in w/c with alarm on and all needs in reach. L arm trough applied to assist with midline posture. G-Code     OutComes Score                                                     AM-PAC Score             Goals  Short term goals  Time Frame for Short term goals: 1-2 weeks  Short term goal 1: Pt will perform all bed mobility with maxA of 1  Short term goal 2: Pt will perform SPT with maxA of 1  Short term goal 3: Pt will ambulate 8' with LRAD with maxA of 1  Short term goal 4: Pt will negotiate 1 step with LRAD and maxA of 1  Long term goals  Time Frame for Long term goals : 2-3 weeks  Long term goal 1: Pt will perform all bed mobility with CGA  Long term goal 2: Pt will perform all transfers with CGA  Long term goal 3: Pt will ambulate 48' with LRAD CGA  Long term goal 4: Pt will negotiate 12 steps with kapil  Patient Goals   Patient goals : pt unable to assess    Plan    Plan  Times per week: 60 minutes a day 5 days a week  Current Treatment Recommendations: Strengthening, Transfer Training, Endurance Training, Neuromuscular Re-education, Patient/Caregiver Education & Training, ROM, Wheelchair Mobility Training, Manual Therapy - Soft Tissue Mobilization, Equipment Evaluation, Education, & procurement, Balance Training, Gait Training, Functional Mobility Training, Stair training, Safety Education & Training, Positioning  Safety Devices  Type of devices:  All fall risk precautions in place, Call light within reach, Chair alarm in place, Left in chair, Telesitter in use  Restraints  Initially in place: No     Therapy Time   Individual Concurrent Group Co-treatment   Time In       0930   Time Out       1030   Minutes       60        Timed Code Treatment Minutes:  60    Total Treatment Minutes:  [address] Amalia Romero, PT, DPT [id number]

## 2021-06-25 NOTE — PROGRESS NOTES
Hakeem Burnette  6/25/2021  [id number]    Chief Complaint: Acute embolic stroke ([facility] [address]    Subjective:   Resting in bed this am. No acute events overnight. No current complaints. Continues to have intermittent episodes of labored breathing. ROS: No CP, SOB, dyspnea    Objective:  Patient Vitals for the past 24 hrs:   BP Temp Temp src Pulse Resp SpO2 Height   06/24/21 2013 (!) 155/76 99 °F (37.2 °C) Oral 69 16 95 %    06/24/21 0949 138/79 98.5 °F (36.9 °C) Oral 75 16 94 %    06/24/21 0853       6' 2\" (1.88 m)     Gen: No distress, pleasant. HEENT: Normocephalic, atraumatic. CV: Regular rate and rhythm. No MRG   Resp: No respiratory distress. CTAB. Respirations intermittently labored and then resolved. Abd: Soft, nontender   Ext: No edema. Neuro: Alert, oriented, appropriately interactive. Higher level cognitive deficits. Laboratory data: Available via EMR. Therapy progress:  PT  Position Activity Restriction  Other position/activity restrictions: 81year old male with a history of CAD, dementia, PVD who was admitted to [facility] on 6/19 with left-sided weakness. He was recently evaluated at this facility for an episode of syncope. Work-up revealed a right watershed ischemic stroke in the right frontal and parietal regions. He was initiated on Plavix in addition to aspirin and statin. A1c 5.3. LDL 88. Echo was unremarkable for an embolic source. He was evaluated by therapy and suggested to continue in an inpatient setting prior to returning home. He has no current complaints today. He is currently sitting up with therapy and having intermittent episodes of increased respirations with decreased heart rate.   Objective     Sit to Stand: Dependent/Total (maxA of 2)  Stand to sit: Dependent/Total (maxA of 2)  Bed to Chair: Dependent/Total (maxi-move)     OT  PT Equipment Recommendations  Equipment Needed: Yes  Other: TBD with progress     Assessment        SLP Body mass index is 29.08 kg/m². Assessment:  Patient Active Problem List   Diagnosis    Essential hypertension    Hyperlipidemia    Palpitations    Family history of premature CAD    Shoulder pain    Knee pain    Late onset Alzheimer's disease without behavioral disturbance ([facility] [address]    Acute renal failure (ARF) (HCC)    Benign prostatic hyperplasia (BPH) with post-void dribbling    Near syncope    Dementia due to Alzheimer's disease ([facility] [address]    Acute embolic stroke ([facility] [address]       Plan:   Right frontal and parietal watershed infarcts: ASA, statin. Plavix for 21 days. PT/OT/SLP. Event monitor mentioned in discharge notes but no monitor noted to be with the patient today. We will follow up and ensure placement given his breathing episodes below.     Intermittent tachypnea: HR decreased during episodes. Monitor     CAD: ASA, statin      HTN: atenolol      Dementia: SLP     Urinary retention: flomax, proscar. Recently required cm placement     Bowels: Per protocol  Bladder: Per protocol   Sleep: Trazodone provided prn. Pain: tylenol, tramadol   DVT PPx: Lovenox   ELOS: 17-21    Electronically signed by KELECHI Ríos CNP on 6/25/2021 at 8:06 AM    * This document was created using dictation software. While all precautions were taken to ensure accuracy, errors may have occurred. Please disregard any typographical errors. This patient has been seen, examined, and discussed with the nurse practitioner. I performed a face to face encounter with this patient on the above date. This note has been altered to reflect my own examination findings, impression, and recommendations.      Electronically signed by Denae Humphreys MD on 6/25/2021 at 3:19 PM

## 2021-06-26 LAB
BILIRUBIN URINE: ABNORMAL
BLOOD, URINE: ABNORMAL
CLARITY: ABNORMAL
COLOR: YELLOW
EPITHELIAL CELLS, UA: 5 /HPF (ref 0–5)
GLUCOSE URINE: NEGATIVE MG/DL
HYALINE CASTS: 13 /LPF (ref 0–8)
KETONES, URINE: NEGATIVE MG/DL
LEUKOCYTE ESTERASE, URINE: ABNORMAL
MICROSCOPIC EXAMINATION: YES
NITRITE, URINE: NEGATIVE
PH UA: 5.5 (ref 5–8)
PROTEIN UA: ABNORMAL MG/DL
RBC UA: ABNORMAL /HPF (ref 0–4)
SPECIFIC GRAVITY UA: 1.02 (ref 1–1.03)
URINE REFLEX TO CULTURE: YES
URINE TYPE: ABNORMAL
UROBILINOGEN, URINE: 1 E.U./DL
WBC UA: 60 /HPF (ref 0–5)

## 2021-06-26 PROCEDURE — 81001 URINALYSIS AUTO W/SCOPE: CPT

## 2021-06-26 PROCEDURE — 97129 THER IVNTJ 1ST 15 MIN: CPT

## 2021-06-26 PROCEDURE — 97530 THERAPEUTIC ACTIVITIES: CPT

## 2021-06-26 PROCEDURE — 6370000000 HC RX 637 (ALT 250 FOR IP): Performed by: PHYSICAL MEDICINE & REHABILITATION

## 2021-06-26 PROCEDURE — 97130 THER IVNTJ EA ADDL 15 MIN: CPT

## 2021-06-26 PROCEDURE — 92526 ORAL FUNCTION THERAPY: CPT

## 2021-06-26 PROCEDURE — 87086 URINE CULTURE/COLONY COUNT: CPT

## 2021-06-26 PROCEDURE — 6360000002 HC RX W HCPCS: Performed by: PHYSICAL MEDICINE & REHABILITATION

## 2021-06-26 PROCEDURE — 97535 SELF CARE MNGMENT TRAINING: CPT

## 2021-06-26 PROCEDURE — 97112 NEUROMUSCULAR REEDUCATION: CPT

## 2021-06-26 PROCEDURE — 1280000000 HC REHAB R&B

## 2021-06-26 RX ORDER — NITROFURANTOIN 25; 75 MG/1; MG/1
100 CAPSULE ORAL EVERY 12 HOURS SCHEDULED
Status: DISCONTINUED | OUTPATIENT
Start: 2021-06-26 | End: 2021-06-27

## 2021-06-26 RX ADMIN — ASPIRIN 81 MG: 81 TABLET, CHEWABLE ORAL at 08:49

## 2021-06-26 RX ADMIN — CLOPIDOGREL BISULFATE 75 MG: 75 TABLET ORAL at 08:50

## 2021-06-26 RX ADMIN — ENOXAPARIN SODIUM 40 MG: 40 INJECTION SUBCUTANEOUS at 08:50

## 2021-06-26 RX ADMIN — ACETAMINOPHEN 650 MG: 325 TABLET ORAL at 17:02

## 2021-06-26 RX ADMIN — TAMSULOSIN HYDROCHLORIDE 0.4 MG: 0.4 CAPSULE ORAL at 08:50

## 2021-06-26 RX ADMIN — ATORVASTATIN CALCIUM 40 MG: 40 TABLET, FILM COATED ORAL at 20:15

## 2021-06-26 RX ADMIN — ATENOLOL 25 MG: 50 TABLET ORAL at 08:50

## 2021-06-26 RX ADMIN — NITROFURANTOIN (MONOHYDRATE/MACROCRYSTALS) 100 MG: 75; 25 CAPSULE ORAL at 20:15

## 2021-06-26 RX ADMIN — FINASTERIDE 5 MG: 5 TABLET, FILM COATED ORAL at 08:50

## 2021-06-26 ASSESSMENT — PAIN SCALES - GENERAL
PAINLEVEL_OUTOF10: 0

## 2021-06-26 NOTE — PLAN OF CARE
Problem: Falls - Risk of:  Goal: Will remain free from falls  Description: Will remain free from falls  6/26/2021 1117 by Clotilde Logan RN  Outcome: Ongoing  6/26/2021 0451 by Sabrina Bledsoe RN  Outcome: Ongoing  Note: Education Provided as followed:  Family/Patient made aware of the tailored interventions falls plan using the TIPS TOOL.    Goal: Absence of physical injury  Description: Absence of physical injury  6/26/2021 1117 by Clotilde Logan RN  Outcome: Ongoing  6/26/2021 0451 by Sabrina Bledsoe RN  Outcome: Ongoing

## 2021-06-26 NOTE — PLAN OF CARE
Enlarged prostate, attempted to cath w/ both coude & red rubber catheter & was unable (for UA). Random bladder scan 214. Incontinent of urine. Does not use call light purposefully. Delayed answers & reactions to commands (moved R extremities when asked to move L). Did help roll when he needed to be changed (to the L, unable to roll to R). Took ultram & trazadone when offered. Unsure how much of cognition is from dementia & how much is new from stroke      Problem: Falls - Risk of:  Goal: Will remain free from falls  Description: Will remain free from falls  Outcome: Ongoing  Note: Education Provided as followed:  Family/Patient made aware of the tailored interventions falls plan using the TIPS TOOL. Goal: Absence of physical injury  Description: Absence of physical injury  Outcome: Ongoing     Problem: Skin Integrity:  Goal: Will show no infection signs and symptoms  Description: Will show no infection signs and symptoms  Outcome: Ongoing  Goal: Absence of new skin breakdown  Description: Absence of new skin breakdown  Outcome: Ongoing     Problem: Confusion - Acute:  Goal: Absence of continued neurological deterioration signs and symptoms  Description: Absence of continued neurological deterioration signs and symptoms  Outcome: Ongoing  Goal: Mental status will be restored to baseline  Description: Mental status will be restored to baseline  Outcome: Ongoing     Problem: Discharge Planning:  Goal: Ability to perform activities of daily living will improve  Description: Ability to perform activities of daily living will improve  Outcome: Ongoing  Goal: Participates in care planning  Description: Participates in care planning  Outcome: Ongoing     Problem: Injury - Risk of, Physical Injury:  Goal: Will remain free from falls  Description: Will remain free from falls  Outcome: Ongoing  Note: Education Provided as followed:  Family/Patient made aware of the tailored interventions falls plan using the TIPS TOOL. Goal: Absence of physical injury  Description: Absence of physical injury  Outcome: Ongoing     Problem: Mood - Altered:  Goal: Mood stable  Description: Mood stable  Outcome: Ongoing  Goal: Absence of abusive behavior  Description: Absence of abusive behavior  Outcome: Ongoing  Goal: Verbalizations of feeling emotionally comfortable while being cared for will increase  Description: Verbalizations of feeling emotionally comfortable while being cared for will increase  Outcome: Ongoing     Problem: Psychomotor Activity - Altered:  Goal: Absence of psychomotor disturbance signs and symptoms  Description: Absence of psychomotor disturbance signs and symptoms  Outcome: Ongoing     Problem: Sensory Perception - Impaired:  Goal: Demonstrations of improved sensory functioning will increase  Description: Demonstrations of improved sensory functioning will increase  Outcome: Ongoing  Goal: Decrease in sensory misperception frequency  Description: Decrease in sensory misperception frequency  Outcome: Ongoing  Goal: Able to refrain from responding to false sensory perceptions  Description: Able to refrain from responding to false sensory perceptions  Outcome: Ongoing  Goal: Demonstrates accurate environmental perceptions  Description: Demonstrates accurate environmental perceptions  Outcome: Ongoing  Goal: Able to distinguish between reality-based and nonreality-based thinking  Description: Able to distinguish between reality-based and nonreality-based thinking  Outcome: Ongoing  Goal: Able to interrupt nonreality-based thinking  Description: Able to interrupt nonreality-based thinking  Outcome: Ongoing     Problem: Sleep Pattern Disturbance:  Goal: Appears well-rested  Description: Appears well-rested  Outcome: Ongoing     Problem: IP BLADDER/VOIDING  Goal: LTG - Patient will utilize adaptive techniques/equipment to complete bladder elimination  Outcome: Ongoing  Goal: STG - Patient demonstrates no accidents  Outcome: Ongoing  Goal: STG - Patient will state signs and symptoms of UTI  Outcome: Ongoing  Goal: STG - patient will be able to empty bladder  Outcome: Ongoing     Problem: IP BOWEL ELIMINATION  Goal: LTG - patient will have regular and routine bowel evacuation  Outcome: Ongoing     Problem: Neurological  Goal: Maximum potential motor/sensory/cognitive function  Outcome: Ongoing

## 2021-06-26 NOTE — PROGRESS NOTES
ACUTE REHAB UNIT  SPEECH/LANGUAGE PATHOLOGY      [x] Daily  [] Weekly Care Conference Note  [] Discharge    Patient:Reno Reynolds     :1935  RZR:[id number]  Room #: HNQ-6930/2431-18   Rehab Dx/Hx: Acute embolic stroke [facility]) [F73.1]  Date of Admit: 2021      Precautions: falls and aspirations  Home situation: Per pt's daughter Omid Carson) pt lives with his grandson (22years old). Esmer Li reports plan is for her and her family to move into his place to provide more assistance. ST Dx: Mild-moderate oropharyngeal dysphagia; Severe cognitive linguistic deficits; Moderate dysarthria / dysphonia    Daily Treatment Info:   Date: 2021     Tx session 1   Tx session 2     Pain Pt did not report. Pt did not report. Subjective     Pt sitting upright in chair with breakfast tray upon SLP arrival. Pt alert and cooperative. Pt sitting upright in chair upon SLP arrival. Pt alert and cooperative. Pt with increased initiation of responses this date. Consistent wet vocal quality noted throughout session however food/drink not provided prior to or during session. Objective:  Goals     Pt will tolerate recommended diet and advanced trials with use of compensatory swallow strategies provided with < mod cues with no overt clinical s/s of aspiration or noted difficulty. Pt sitting upright in chair with breakfast meal however not initiating opening lid to begin meal.     SLP assisted with set-up of meal (eggs, cut-up sausage and yogurt) however pt able to feed self independently. Pt seen taking large continuous bites of eggs and sausage requiring MOD verbal and tactile cues to pace self. Dysphagia III/soft solids revealed mild oral stasis with SLP providing MIN verbal cues for use of liquid wash for clearance. No overt s/s of aspiration/penetration noted with trials of soft solids. Pt noted to have a wet vocal quality throughout trials of puree.  Again, pt requiring MOD verbal and tactile cues to pace self. Episodes of wet vocal quality noted with trials of puree. Verbal cues for hard throat clear/reswallow for clearance however only successful in 2/6 attempts. A wet vocal quality also assessed with thin liquids requiring verbal cues for use of hard throat clear/reswallow. Goal not targeted this session. Pt will respond and/or initiate action upon verbal prompt within 3 seconds in 4 out of 5 opportunities without repetition. Reduced initiation with breakfast meal prior to set-up requiring SLP to provide set-up for pt this date. Pt initiated responses for meal set-up (salt/pepper, etc) in 3/5 opportunities d/t delay in responses    Increase in initiation of responses noted this date however no spontaneous speech assessed   Multi-step directions followed within 3 seconds without repetition:   - 1 step: 6/6   - 2 step: 5/6  - 3 step: 3/6   Pt will use visual aids/ strategies to state orientation to time, place, situation with 100% accuracy across 3 consecutive sessions. With independent use of visual aids (digital clock), pt oriented to:   -person  -date  -ABHINAV  -month  -year    Disoriented to:   -place   -situation With independent use of visual aids, pt oriented to:   -person  -date  -ABHINAV  -month  -year    Disoriented to:   -place   -situation     Pt will improve oral motor function, articulatory precision and breath support in connected speech via graded tasks to >80% acc with use of speech strategies. Goal not targeted this session. Goal not targeted this session. Pt will improve verbal initiation and thought organization for extended utterances (i.e. related to personal hx/ recall of recent events, etc.) >75% acc.    Pt responded to 7/10 [facility]- questions re: education/career/family/hobbies given one repetition of question.   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 When asked if pt had a stroke, pt reported that he did not. Pt initiating responses to SLP questions however demonstrating reduced recall of recent events. Patient will complete fx problem solving tasks and demonstrate insight into limitations and impact on daily functioning with >75% acc, min cues. Pt noted to not ask for help when yogurt container fell into lap and pt was unable to move it back to table  - SLP encouraged pt to ask for help after educating on changes/need for help following stroke   - Pt noted to ask for help with meal throughout session given MOD verbal cues  Goal not targeted this session. Other areas targeted:  Convergent naming task  - Pt provided with three items and asked to determine category  - Delayed responses noted  - 5/10 (50%) independent increased to 10/10 (100%) given (f/2)    Education:   Educated pt re: role of SLP, deficits from stroke, rationale for tx tasks, swallow strategies/precautions, and plan of care. No evidence of learning. Education re: role of SLP, rationale for intervention, POC. No evidence of learning   Safety Devices: [x] Call light within reach  [x] Chair alarm activated  [] Bed alarm activated  [x] Other: camera in room [x] Call light within reach  [x] Chair alarm activated  [] Bed alarm activated  [x] Other: camera in room     Assessment:   Speech Therapy Diagnosis  Cognitive Diagnosis: Pt presents with severe cognitive linguistic deficits due to reduced orienation, reduced processing speed, reduced insight into current situation, reduced immediate/ working/ short term memory with fx problem solving difficulty. Per chart review pt with baseline dementia, did not complete IADLs at home prior to CVA but was able to be safely left alone with daily check ins from family. Aphasia Diagnosis: Significantly reduced processing speed/ difficulty following multi-step commands.  Unable to discern if due to pt being Habematolel vs reduced auditory comprehension/ initiation, requires further assessment. Pt c/o word finding diffiuclty, pt with minimal verbal output during evaluation, requires ongoing assessment for goal generation. Speech Diagnosis: Moderate dysarthria/ dysphonia, pt with intermittent periods of tachypnea and reduced breath support impacting intelligibility at the short phrase/ sentence level with reduced articulatory precision, pt with hoarse, gravely wet vocal quality    Current Diet Order: ADULT DIET; Dysphagia - Soft and Bite Sized;  No Drinking Straws            Plan:     Frequency:  5days/week   60 minutes/day  Discharge Recommendations:   Barriers: Cognitive deficit  Discharge Recommendations:  [] Home independently  [] Home with assistance []  24 hour supervision  [] SNF [x] Other: TBD  Continued SLP Treatment:  [x] Yes [] No [] TBD based on progress while on ARU [] Vital Stim indicated [] Other:   Estimated discharge date: 7/16/21    Type of Total Treatment Minutes   Session 1   Session 2   Time In 0800 1000   Time Out 0830 1030   Timed Code Minutes  15 30   Individual Treatment Minutes  30 30   Co-Treatment Minutes      Group Treatment Minutes      Concurrent Treatment Minutes        TOTAL DAILY MINUTES:  60    Electronically Signed by     Troy Lacy M.A. CF-SLP  Speech-Language Pathologist- Clinical Fellow  6/26/2021  1:13 PM

## 2021-06-26 NOTE — PROGRESS NOTES
Occupational Therapy  Facility/Department: [facility] ACUTE REHAB UNIT  Daily Treatment Note  NAME: Juarez Turcios  : 1935  MRN: [id number]    Date of Service: 2021    Discharge Recommendations:  Continue to assess pending progress, Patient would benefit from continued therapy after discharge  OT Equipment Recommendations  Equipment Needed: No  Other: Continue to assess pending pt progress    Assessment   Performance deficits / Impairments: Decreased functional mobility ; Decreased endurance;Decreased coordination;Decreased posture;Decreased ADL status; Decreased ROM; Decreased strength;Decreased balance;Decreased vision/visual deficit; Decreased high-level IADLs;Decreased safe awareness;Decreased cognition;Decreased fine motor control  Assessment: 81 y.o. male presents w/ the above deficits which are impacting his occupational performance. Pt would benefit from continued therapy during inpatient stay in order to maximize safety and independence upon discharge. Treatment Diagnosis: Multiple performance deficits s/p CVA  Prognosis: Fair  Decision Making: High Complexity  OT Education: OT Role;Plan of Care;ADL Adaptive Strategies;Transfer Training;Orientation  Patient Education: Pt is not an independent learner  Barriers to Learning: Cognition  REQUIRES OT FOLLOW UP: Yes  Activity Tolerance  Activity Tolerance: Patient Tolerated treatment well;Patient limited by fatigue  Safety Devices  Safety Devices in place: Yes  Type of devices: All fall risk precautions in place;Call light within reach; Chair alarm in place;Gait belt;Patient at risk for falls; Left in chair;Nurse notified         Patient Diagnosis(es): CVA     has a past medical history of Dementia ([facility] [address] HTN (hypertension), Hyperlipidemia, and PONV (postoperative nausea and vomiting). has a past surgical history that includes Knee arthroscopy; Colonoscopy; eye surgery (Right, 2014);  Cystoscopy (2018); and TURP (04/26/2018). Restrictions  Restrictions/Precautions  Restrictions/Precautions: Fall Risk, Modified Diet  Required Braces or Orthoses?: No  Position Activity Restriction  Other position/activity restrictions: 81year old male with a history of CAD, dementia, PVD who was admitted to [facility] on 6/19 with left-sided weakness. He was recently evaluated at this facility for an episode of syncope. Work-up revealed a right watershed ischemic stroke in the right frontal and parietal regions. He was initiated on Plavix in addition to aspirin and statin. A1c 5.3. LDL 88. Echo was unremarkable for an embolic source. He was evaluated by therapy and suggested to continue in an inpatient setting prior to returning home. He has no current complaints today. He is currently sitting up with therapy and having intermittent episodes of increased respirations with decreased heart rate. Subjective   General  Chart Reviewed: Yes  Patient assessed for rehabilitation services?: Yes  Response to previous treatment: Patient with no complaints from previous session  Family / Caregiver Present: No  Diagnosis: CVA  Subjective  Subjective: Pt seated in recliner upon entry, agreeable to OT/PT co-treatment  Vital Signs  Patient Currently in Pain: Denies   Orientation  Orientation  Overall Orientation Status: Impaired  Orientation Level: Oriented to place;Oriented to person;Disoriented to time;Disoriented to situation  Objective    ADL  Grooming: Setup;Minimal assistance; Increased time to complete;Verbal cueing (oral care/shaving seated in w/c at sink, cuing for initiating and sequencing tasks throughout)  Additional Comments: Pt declined further ADL completion        Balance  Sitting Balance: Maximum assistance (varied between CGA-max A, significant verbal and tactile cuing provided to achieve midline seated in recliner chair/w/c), significant L lateral lean-pt unable to self correct  Standing Balance: Dependent/Total  Standing Balance  Time: ~2 minutes  Activity: functional transfers, stance in parallel bars  Comment: able to achieve full stance this date, Max A of 2, sit <> stand x2 performed to bas-- static stance/lateral weight shifts performed, use of dowel gricel on the R UE to facility weight shifting     Transfers  Sit to stand: 2 Person assistance;Dependent/Total  Stand to sit: 2 Person assistance;Dependent/Total  Transfer Comments: initial sit to stand performed from recliner chair for LB dressing-Max A + min A of another, further transfers max A of 2, recliner chair <> w/c     Cognition  Overall Cognitive Status: Exceptions  Arousal/Alertness: Delayed responses to stimuli  Following Commands: Follows one step commands with repetition; Follows one step commands with increased time  Attention Span: Attends with cues to redirect  Memory: Decreased recall of biographical Information;Decreased short term memory;Decreased long term memory;Decreased recall of recent events  Safety Judgement: Decreased awareness of need for safety;Decreased awareness of need for assistance  Problem Solving: Decreased awareness of errors;Assistance required to identify errors made;Assistance required to correct errors made  Insights: Decreased awareness of deficits  Initiation: Requires cues for some  Sequencing: Requires cues for some  Cognition Comment: Increased processing time for verbal commands     Perception  Overall Perceptual Status: Impaired  Unilateral Attention: Cues to maintain midline in sitting;Cues to attend to left side of body  Initiation: Cues to initiate tasks  Motor Planning: Hand over hand to sequence tasks (initially requiring HHA)  Perseveration: Perseverates during ADLs    Plan   Plan  Times per week: 60 min; 5-7x  Times per day: Daily  Current Treatment Recommendations: Strengthening, Patient/Caregiver Education & Training, Home Management Training, Equipment Evaluation, Education, & procurement, Balance Training, Neuromuscular Re-education, Functional Mobility Training, Endurance Training, Safety Education & Training, Self-Care / ADL    Goals  Short term goals  Time Frame for Short term goals: 1-2 weeks  Short term goal 1: Functional transfer for ADL completion w/ assist x1  Short term goal 2: Bathing w/ assist x1  Short term goal 3: LB dressing w/ assist x1  Short term goal 4: Toileting w/ assist x1  Short term goal 5: UB dressing w/ Min A  Long term goals  Time Frame for Long term goals : 3-4 weeks  Long term goal 1: Functional transfer for ADL completion w/ supervision  Long term goal 2: Bathing w/ supervision  Long term goal 3: LB dressing w/ supervision  Long term goal 4: Toileting w/ supervision  Long term goal 5: UB dressing w/ supervision  Patient Goals   Patient goals :  To go home       Therapy Time   Individual Concurrent Group Co-treatment   Time In       0845   Time Out       0945   Minutes       60      Timed Code Treatment Minutes:  60 Minutes  Total Treatment Minutes:  [address] [place] [address], 600 [facility] [place]/L, [place] #[id number]

## 2021-06-26 NOTE — PROGRESS NOTES
Physical Therapy  Facility/Department: [facility] ACUTE REHAB UNIT  Daily Treatment Note  NAME: Facundo Sanders  : 1935  MRN: [id number]    Date of Service: 2021    Discharge Recommendations:  24 hour supervision or assist, Home with Home health PT, S Level 3   PT Equipment Recommendations  Equipment Needed: Yes  Other: TBD with progress    Assessment   Body structures, Functions, Activity limitations: Decreased functional mobility ; Decreased balance;Decreased coordination;Decreased endurance;Decreased strength;Decreased posture;Decreased ADL status; Decreased safe awareness;Decreased ROM  Assessment: Pt presenting with improved transfers including improved sit <=> stand transfers and progression to stand pivot transfers. Patient demsontrates improved attempted postural correction in response to VC/TC. Increased (L) UE active use noted within functional tasks. Despite improvements patient continues to require extensive assist of 2 for all transfers and is non ambulatory at this time. Treatment Diagnosis: decreased balance, coordination, and functional mobiilty  Prognosis: Fair  PT Education: Goals;PT Role;Plan of Care;Transfer Training;General Safety; Functional Mobility Training  Patient Education: Pt will require reinforcement secondary to cognitive deficits. Barriers to Learning: cognition  REQUIRES PT FOLLOW UP: Yes  Activity Tolerance  Activity Tolerance: Patient limited by fatigue;Patient limited by endurance; Patient limited by cognitive status  Activity Tolerance: Increased time for verbal and physical response to commands. Patient fatigue noted throughout session resulting in progressive increased trunk lean and assist for transfers. Patient Diagnosis(es): CVA     has a past medical history of Dementia ([facility] [address] HTN (hypertension), Hyperlipidemia, and PONV (postoperative nausea and vomiting). has a past surgical history that includes Knee arthroscopy;  Colonoscopy; eye surgery (Right, 2014); Cystoscopy (04/04/2018); and TURP (04/26/2018). Restrictions  Restrictions/Precautions  Restrictions/Precautions: Fall Risk, Modified Diet  Required Braces or Orthoses?: No  Position Activity Restriction  Other position/activity restrictions: 81year old male with a history of CAD, dementia, PVD who was admitted to [facility] on 6/19 with left-sided weakness. He was recently evaluated at this facility for an episode of syncope. Work-up revealed a right watershed ischemic stroke in the right frontal and parietal regions. He was initiated on Plavix in addition to aspirin and statin. A1c 5.3. LDL 88. Echo was unremarkable for an embolic source. He was evaluated by therapy and suggested to continue in an inpatient setting prior to returning home. He has no current complaints today. He is currently sitting up with therapy and having intermittent episodes of increased respirations with decreased heart rate. Subjective   General  Chart Reviewed: Yes  Response To Previous Treatment: Patient with no complaints from previous session. Family / Caregiver Present: No  Referring Practitioner: Dr Luis Calhoun  Subjective  Subjective: Pt denies pain, agreeable to therapy session. General Comment  Comments: Pt seated in recliner upon arrival.        Objective   Transfers  Sit to Stand: 2 Person Assistance (Ranges from max (A) of 1 to max (A) of 1 + min (A) of 1. Completions from recliner, // bars, and w/c => stair HR.)  Stand to sit: 2 Person Assistance  Stand Pivot Transfers: 2 Person Assistance (max (A) of 2 recliner <=> w/c. Initiates lean toward chair surface and initial stance. Able to pivot (R) LE with assist of 2 to maintain stance.)  Comment: (L) lateral lean both in seated prep and during transfer completion. Ambulation  Ambulation?: No     Balance  Posture: Poor (consistent (L) lateral lean.   Able to paritally correct in static seated position in response to VC/TC)  Sitting - Static: Poor  Sitting - Dynamic: Poor;-  Standing - Static: Poor;-  Standing - Dynamic: Poor;-        Comment: Session begins with seated grooming/oral hygiene completed at sink. With VC patient demonstrates increased functional use of (L) UE during task completion. In addition to OT assist for ADL component, patient requires min (A) plus VC/TC to maintain upright posture during ADL task with consistent (L) lateral lean. Sit <=>  // bars x 2 completions as documented above. Static stance in // bars ranges from mod/max (A) of 2 with TC for postural correction, (R) UE dowel gricel push/pull to faciliate (R) weight shift. Stand activity completed at (B) stair HR including static postural holds, lateral weight shifting, and (B) UE hand slides all requiring mod/max (A) of 2 for stabilization and faciliation.           Goals  Short term goals  Time Frame for Short term goals: 1-2 weeks  Short term goal 1: Pt will perform all bed mobility with maxA of 1  Short term goal 2: Pt will perform SPT with maxA of 1  Short term goal 3: Pt will ambulate 8' with LRAD with maxA of 1  Short term goal 4: Pt will negotiate 1 step with LRAD and maxA of 1  Long term goals  Time Frame for Long term goals : 2-3 weeks  Long term goal 1: Pt will perform all bed mobility with CGA  Long term goal 2: Pt will perform all transfers with CGA  Long term goal 3: Pt will ambulate 48' with LRAD CGA  Long term goal 4: Pt will negotiate 12 steps with kapil  Patient Goals   Patient goals : pt unable to assess    Plan    Plan  Times per week: 60 minutes a day 5 days a week  Current Treatment Recommendations: Strengthening, Transfer Training, Endurance Training, Neuromuscular Re-education, Patient/Caregiver Education & Training, ROM, Wheelchair Mobility Training, Manual Therapy - Soft Tissue Mobilization, Equipment Evaluation, Education, & procurement, Balance Training, Gait Training, Functional Mobility Training, Stair training, Safety Education & Training, Positioning, ADL/Self-care Training  Safety Devices  Type of devices: Call light within reach, Chair alarm in place, Left in chair, Telesitter in use, Gait belt  Restraints  Initially in place: No     Therapy Time   Individual Concurrent Group Co-treatment   Time In       0845   Time Out       0945   Minutes       60   Timed Code Treatment Minutes: Dionicio [address] PT, DPT - [id number]   co-tx completed on this date to maximize functional mobility and maintain patient safety

## 2021-06-27 ENCOUNTER — APPOINTMENT (OUTPATIENT)
Dept: GENERAL RADIOLOGY | Age: 86
DRG: 056 | End: 2021-06-27
Attending: PHYSICAL MEDICINE & REHABILITATION
Payer: MEDICARE

## 2021-06-27 LAB
ANION GAP SERPL CALCULATED.3IONS-SCNC: 7 MMOL/L (ref 3–16)
BASOPHILS ABSOLUTE: 0.1 K/UL (ref 0–0.2)
BASOPHILS RELATIVE PERCENT: 0.9 %
BUN BLDV-MCNC: 24 MG/DL (ref 7–20)
CALCIUM SERPL-MCNC: 9.1 MG/DL (ref 8.3–10.6)
CHLORIDE BLD-SCNC: 100 MMOL/L (ref 99–110)
CO2: 29 MMOL/L (ref 21–32)
CREAT SERPL-MCNC: 0.9 MG/DL (ref 0.8–1.3)
EOSINOPHILS ABSOLUTE: 0.6 K/UL (ref 0–0.6)
EOSINOPHILS RELATIVE PERCENT: 6.2 %
GFR AFRICAN AMERICAN: >60
GFR NON-AFRICAN AMERICAN: >60
GLUCOSE BLD-MCNC: 108 MG/DL (ref 70–99)
HCT VFR BLD CALC: 40.2 % (ref 40.5–52.5)
HEMOGLOBIN: 13.6 G/DL (ref 13.5–17.5)
LYMPHOCYTES ABSOLUTE: 1.7 K/UL (ref 1–5.1)
LYMPHOCYTES RELATIVE PERCENT: 16.4 %
MCH RBC QN AUTO: 29.8 PG (ref 26–34)
MCHC RBC AUTO-ENTMCNC: 33.8 G/DL (ref 31–36)
MCV RBC AUTO: 88.2 FL (ref 80–100)
MONOCYTES ABSOLUTE: 0.9 K/UL (ref 0–1.3)
MONOCYTES RELATIVE PERCENT: 9.1 %
NEUTROPHILS ABSOLUTE: 6.8 K/UL (ref 1.7–7.7)
NEUTROPHILS RELATIVE PERCENT: 67.4 %
PDW BLD-RTO: 14.2 % (ref 12.4–15.4)
PLATELET # BLD: 335 K/UL (ref 135–450)
PMV BLD AUTO: 8.9 FL (ref 5–10.5)
POTASSIUM SERPL-SCNC: 4 MMOL/L (ref 3.5–5.1)
RBC # BLD: 4.55 M/UL (ref 4.2–5.9)
SODIUM BLD-SCNC: 136 MMOL/L (ref 136–145)
URINE CULTURE, ROUTINE: NORMAL
WBC # BLD: 10.1 K/UL (ref 4–11)

## 2021-06-27 PROCEDURE — 1280000000 HC REHAB R&B

## 2021-06-27 PROCEDURE — 80048 BASIC METABOLIC PNL TOTAL CA: CPT

## 2021-06-27 PROCEDURE — 6370000000 HC RX 637 (ALT 250 FOR IP): Performed by: PHYSICAL MEDICINE & REHABILITATION

## 2021-06-27 PROCEDURE — 6360000002 HC RX W HCPCS: Performed by: PHYSICAL MEDICINE & REHABILITATION

## 2021-06-27 PROCEDURE — 85025 COMPLETE CBC W/AUTO DIFF WBC: CPT

## 2021-06-27 PROCEDURE — 71045 X-RAY EXAM CHEST 1 VIEW: CPT

## 2021-06-27 PROCEDURE — 36415 COLL VENOUS BLD VENIPUNCTURE: CPT

## 2021-06-27 RX ORDER — LEVOFLOXACIN 500 MG/1
500 TABLET, FILM COATED ORAL DAILY
Status: COMPLETED | OUTPATIENT
Start: 2021-06-27 | End: 2021-07-04

## 2021-06-27 RX ORDER — SENNA AND DOCUSATE SODIUM 50; 8.6 MG/1; MG/1
2 TABLET, FILM COATED ORAL 2 TIMES DAILY
Status: DISCONTINUED | OUTPATIENT
Start: 2021-06-28 | End: 2021-06-29

## 2021-06-27 RX ORDER — POLYETHYLENE GLYCOL 3350 17 G/17G
17 POWDER, FOR SOLUTION ORAL DAILY PRN
Status: DISCONTINUED | OUTPATIENT
Start: 2021-06-27 | End: 2021-07-16 | Stop reason: HOSPADM

## 2021-06-27 RX ADMIN — CLOPIDOGREL BISULFATE 75 MG: 75 TABLET ORAL at 08:40

## 2021-06-27 RX ADMIN — FINASTERIDE 5 MG: 5 TABLET, FILM COATED ORAL at 08:38

## 2021-06-27 RX ADMIN — ENOXAPARIN SODIUM 40 MG: 40 INJECTION SUBCUTANEOUS at 08:40

## 2021-06-27 RX ADMIN — ATORVASTATIN CALCIUM 40 MG: 40 TABLET, FILM COATED ORAL at 22:08

## 2021-06-27 RX ADMIN — TAMSULOSIN HYDROCHLORIDE 0.4 MG: 0.4 CAPSULE ORAL at 08:37

## 2021-06-27 RX ADMIN — ATENOLOL 25 MG: 50 TABLET ORAL at 08:38

## 2021-06-27 RX ADMIN — TRAZODONE HYDROCHLORIDE 50 MG: 50 TABLET ORAL at 22:08

## 2021-06-27 RX ADMIN — LEVOFLOXACIN 500 MG: 500 TABLET, FILM COATED ORAL at 12:19

## 2021-06-27 RX ADMIN — ASPIRIN 81 MG: 81 TABLET, CHEWABLE ORAL at 08:38

## 2021-06-27 RX ADMIN — NITROFURANTOIN (MONOHYDRATE/MACROCRYSTALS) 100 MG: 75; 25 CAPSULE ORAL at 08:38

## 2021-06-27 ASSESSMENT — PAIN SCALES - GENERAL
PAINLEVEL_OUTOF10: 0

## 2021-06-27 NOTE — PLAN OF CARE
behavior  Description: Absence of abusive behavior  Outcome: Ongoing  Goal: Verbalizations of feeling emotionally comfortable while being cared for will increase  Description: Verbalizations of feeling emotionally comfortable while being cared for will increase  Outcome: Ongoing     Problem: Psychomotor Activity - Altered:  Goal: Absence of psychomotor disturbance signs and symptoms  Description: Absence of psychomotor disturbance signs and symptoms  Outcome: Ongoing     Problem: Sensory Perception - Impaired:  Goal: Demonstrations of improved sensory functioning will increase  Description: Demonstrations of improved sensory functioning will increase  Outcome: Ongoing  Goal: Decrease in sensory misperception frequency  Description: Decrease in sensory misperception frequency  Outcome: Ongoing  Goal: Able to refrain from responding to false sensory perceptions  Description: Able to refrain from responding to false sensory perceptions  Outcome: Ongoing  Goal: Demonstrates accurate environmental perceptions  Description: Demonstrates accurate environmental perceptions  Outcome: Ongoing  Goal: Able to distinguish between reality-based and nonreality-based thinking  Description: Able to distinguish between reality-based and nonreality-based thinking  Outcome: Ongoing  Goal: Able to interrupt nonreality-based thinking  Description: Able to interrupt nonreality-based thinking  Outcome: Ongoing     Problem: Sleep Pattern Disturbance:  Goal: Appears well-rested  Description: Appears well-rested  Outcome: Ongoing     Problem: IP BLADDER/VOIDING  Goal: LTG - Patient will utilize adaptive techniques/equipment to complete bladder elimination  Outcome: Ongoing  Goal: STG - Patient demonstrates no accidents  Outcome: Ongoing  Goal: STG - Patient will state signs and symptoms of UTI  Outcome: Ongoing  Goal: STG - patient will be able to empty bladder  Outcome: Ongoing     Problem: IP BOWEL ELIMINATION  Goal: LTG - patient will have regular and routine bowel evacuation  Outcome: Ongoing     Problem: Neurological  Goal: Maximum potential motor/sensory/cognitive function  Outcome: Ongoing

## 2021-06-27 NOTE — PLAN OF CARE
Patient had some wheezing and rhonchi this am in upper and lower lobes. New orders for CXR, CBC, and BMP. See results, patient started on Levaquin per Dr. Tammy Williamson. WBC count back down within normal limits today. Daughter at bedside today, will continue to monitor.

## 2021-06-28 ENCOUNTER — APPOINTMENT (OUTPATIENT)
Dept: GENERAL RADIOLOGY | Age: 86
DRG: 056 | End: 2021-06-28
Attending: PHYSICAL MEDICINE & REHABILITATION
Payer: MEDICARE

## 2021-06-28 LAB
ANION GAP SERPL CALCULATED.3IONS-SCNC: 7 MMOL/L (ref 3–16)
BUN BLDV-MCNC: 18 MG/DL (ref 7–20)
CALCIUM SERPL-MCNC: 9.1 MG/DL (ref 8.3–10.6)
CHLORIDE BLD-SCNC: 106 MMOL/L (ref 99–110)
CO2: 29 MMOL/L (ref 21–32)
CREAT SERPL-MCNC: 0.9 MG/DL (ref 0.8–1.3)
GFR AFRICAN AMERICAN: >60
GFR NON-AFRICAN AMERICAN: >60
GLUCOSE BLD-MCNC: 103 MG/DL (ref 70–99)
HCT VFR BLD CALC: 39.8 % (ref 40.5–52.5)
HEMOGLOBIN: 13.4 G/DL (ref 13.5–17.5)
MCH RBC QN AUTO: 29.5 PG (ref 26–34)
MCHC RBC AUTO-ENTMCNC: 33.6 G/DL (ref 31–36)
MCV RBC AUTO: 87.9 FL (ref 80–100)
PDW BLD-RTO: 14.5 % (ref 12.4–15.4)
PLATELET # BLD: 367 K/UL (ref 135–450)
PMV BLD AUTO: 8.5 FL (ref 5–10.5)
POTASSIUM SERPL-SCNC: 4 MMOL/L (ref 3.5–5.1)
RBC # BLD: 4.52 M/UL (ref 4.2–5.9)
SODIUM BLD-SCNC: 142 MMOL/L (ref 136–145)
WBC # BLD: 9.4 K/UL (ref 4–11)

## 2021-06-28 PROCEDURE — 97112 NEUROMUSCULAR REEDUCATION: CPT

## 2021-06-28 PROCEDURE — 97129 THER IVNTJ 1ST 15 MIN: CPT

## 2021-06-28 PROCEDURE — 36415 COLL VENOUS BLD VENIPUNCTURE: CPT

## 2021-06-28 PROCEDURE — 97530 THERAPEUTIC ACTIVITIES: CPT

## 2021-06-28 PROCEDURE — 74230 X-RAY XM SWLNG FUNCJ C+: CPT

## 2021-06-28 PROCEDURE — 80048 BASIC METABOLIC PNL TOTAL CA: CPT

## 2021-06-28 PROCEDURE — 92526 ORAL FUNCTION THERAPY: CPT

## 2021-06-28 PROCEDURE — 1280000000 HC REHAB R&B

## 2021-06-28 PROCEDURE — 6370000000 HC RX 637 (ALT 250 FOR IP): Performed by: PHYSICAL MEDICINE & REHABILITATION

## 2021-06-28 PROCEDURE — 85027 COMPLETE CBC AUTOMATED: CPT

## 2021-06-28 PROCEDURE — 6360000002 HC RX W HCPCS: Performed by: PHYSICAL MEDICINE & REHABILITATION

## 2021-06-28 PROCEDURE — 97130 THER IVNTJ EA ADDL 15 MIN: CPT

## 2021-06-28 PROCEDURE — 92611 MOTION FLUOROSCOPY/SWALLOW: CPT

## 2021-06-28 PROCEDURE — 97535 SELF CARE MNGMENT TRAINING: CPT

## 2021-06-28 RX ORDER — LISINOPRIL 5 MG/1
5 TABLET ORAL DAILY
Status: DISCONTINUED | OUTPATIENT
Start: 2021-06-28 | End: 2021-07-06

## 2021-06-28 RX ADMIN — ENOXAPARIN SODIUM 40 MG: 40 INJECTION SUBCUTANEOUS at 08:19

## 2021-06-28 RX ADMIN — ATORVASTATIN CALCIUM 40 MG: 40 TABLET, FILM COATED ORAL at 20:18

## 2021-06-28 RX ADMIN — ATENOLOL 25 MG: 50 TABLET ORAL at 08:18

## 2021-06-28 RX ADMIN — LISINOPRIL 5 MG: 5 TABLET ORAL at 14:46

## 2021-06-28 RX ADMIN — TAMSULOSIN HYDROCHLORIDE 0.4 MG: 0.4 CAPSULE ORAL at 08:19

## 2021-06-28 RX ADMIN — ASPIRIN 81 MG: 81 TABLET, CHEWABLE ORAL at 08:19

## 2021-06-28 RX ADMIN — FINASTERIDE 5 MG: 5 TABLET, FILM COATED ORAL at 08:19

## 2021-06-28 RX ADMIN — STANDARDIZED SENNA CONCENTRATE AND DOCUSATE SODIUM 2 TABLET: 8.6; 5 TABLET ORAL at 08:19

## 2021-06-28 RX ADMIN — LEVOFLOXACIN 500 MG: 500 TABLET, FILM COATED ORAL at 08:19

## 2021-06-28 RX ADMIN — CLOPIDOGREL BISULFATE 75 MG: 75 TABLET ORAL at 08:18

## 2021-06-28 ASSESSMENT — PAIN SCALES - GENERAL: PAINLEVEL_OUTOF10: 0

## 2021-06-28 NOTE — PROCEDURES
[address] THERAPY  MODIFIED BARIUM SWALLOW EVALUATION    Patient's Name: Joni Monroe. O.B: 1935  Medical Diagnosis: Acute embolic stroke [facility]) [Q73.6]  Treatment Diagnosis: Dysphagia    Ordering MD: Dr. Elsie Barbour   Radiologist: Dr. Paul Smiley   Date of Onset:  6/19/2021   Date of Evaluation: 6/28/2021  Type of Study: Modified Barium Swallowing Study (MBS)  Diet Prior to Study: Thin liquid; Dysphagia soft and bite sized solids   Pain Level: None reported     Subjective:   Pt currently being seen on the rehab unit for dysphagia treatment s/p CVA. He has had intermittent episodes of labored breathing but appears to tolerate his current diet at bedside. Chest XR was completed, 6/27/2021:   New right perihilar airspace disease which may either be due to pneumonia or   developing edema. Impression:  Modified Barium Swallow evaluation completed on 6/28/2021. Results indicate moderate oropharyngeal dysphagia. Pt demonstrated consistent laryngeal penetration of liquid consistencies that minimized but was not completely eliminated with thicker liquids. Delayed sensation of the thinner consistencies resulted in pooling to the valleculae, reduced airway protection, and epiglottic inversion. Pt did not have a cough response to the penetration episodes. Pt was cued for use of chin tuck; reduced coordination impacted the timing but appeared to improve the penetration into the airway. Complete view of the airway was obstructed by positioning/posture in the wheelchair. Although no aspiration was viewed, pt has  delayed responses to commands, requires consistent verbal cues for carry over of new information and has difficulty maintaining midline while seated; increasing risk for episodic aspiration.      Penetration/Aspiration Scores across consistencies     CONSISTENCY  Pen/Asp rating Description    Thin  2 Material enters the airway, remains above the vocal folds, and is ejected from the airway   Mildly (nectar) thick  2 Material enters the airway, remains above the vocal folds, and is ejected from the airway   Moderately (honey) thick  2 Material enters the airway, remains above the vocal folds, and is ejected from the airway   Puree  1 Material does not enter the airway    Soft Solid  1 Material does not enter the airway    Regular Solid  1 Material does not enter the airway           Recommendations:    Diet Level: Mildly thick liquids; Soft and Bite sized solids - allow thin water between meals with staff supervision   Referral: n/a   Strategies:  Upright 90 degrees at meals; No Straws;  Meds with puree; Small bites/sips  Treatments: Speech Therapy for dysphagia treatment  Goals: See previously established plan of care    Consistencies given: Thin, Mildly Thick (Nectar) Liquids, Moderately Thick (honey) Liquids, Puree, Soft solid, Solid    Oral Phase  -Reduced bolus control  -Premature bolus loss to pharynx  -Reduced/prolonged mastication  -Reduced A-P bolus propulsion    Pharyngeal Phase  -Delayed swallow onset  -Pooling to the valleculae   - penetration with thin, mildly and moderately thick liquids   - trace residue in the valleculae and base of tongue with thin liquids     Esophageal Phase  Unremarkable    Following Evaluation:  Results/recommendations and education given to Patient and nurse, who verbalized understanding    Time In: 1015  Time Out: 1050  Total Treatment Time: 35 minutes         Signature  HAYDEN Finn, MA, CCC-SLP    Speech-Language Pathologist

## 2021-06-28 NOTE — PATIENT CARE CONFERENCE
[facility],[place] [address]  Inpatient Rehabilitation  Weekly Team Conference Note    Patient Name: Jack Poe        MRN: [id number]    : 1935  (81 y.o.)  Gender: male   Referring Practitioner: Dr Luis Calhoun  Diagnosis: CVA    The team conference for this patient was held on 21 at 11:00am by:  Jc Bowen MD    CASE MANAGEMENT:  Assessment: Patient lives at home and adult grandson lives with patient. Patient's daughter Vianney Workman and her family ( and 4 children) are in the process of moving in with the patient to give patient support and care. Patient's daughter is a [address]. Patient is a retired orthopedic surgeon.     PHYSICAL THERAPY:    Transfers:  Sit to Stand: 2 Person Assistance, Dependent/Total (maxA of 2 in parallel bars)  Stand to sit: 2 Person Assistance, Dependent/Total (maxA of 2 in parallel bars)  Bed to Chair: Dependent/Total, 2 Person Assistance (maxA of 2 in parallel bars)    QM:  Roll Left and Right  Assistance Needed: Substantial/maximal assistance  Comment: pt able to help roll to the L  CARE Score: 2  Discharge Goal: Supervision or touching assistance  Sit to Lying  Assistance Needed: Dependent  CARE Score: 1  Discharge Goal: Supervision or touching assistance  Lying to Sitting on Side of Bed  Assistance Needed: Dependent  CARE Score: 1  Discharge Goal: Supervision or touching assistance  Sit to Stand  Assistance Needed: Dependent  CARE Score: 1  Discharge Goal: Supervision or touching assistance  Chair/Bed-to-Chair Transfer  Assistance Needed: Dependent  CARE Score: 1  Discharge Goal: Supervision or touching assistance  Car Transfer  Reason if not Attempted: Not attempted due to medical condition or safety concerns  CARE Score: 88  Discharge Goal: Supervision or touching assistance  Walk 10 Feet  Reason if not Attempted: Not attempted due to medical condition or safety concerns  CARE Score: 88  Discharge Goal: Supervision or touching assistance  Walk 50 Feet with Two Turns  Reason if not Attempted: Not attempted due to medical condition or safety concerns  CARE Score: 88  Discharge Goal: Supervision or touching assistance  Walk 150 Feet  Reason if not Attempted: Not attempted due to medical condition or safety concerns  CARE Score: 88  Discharge Goal: Supervision or touching assistance  Walking 10 Feet on Uneven Surfaces  Reason if not Attempted: Not attempted due to medical condition or safety concerns  CARE Score: 88  Discharge Goal: Supervision or touching assistance  1 Step (Curb)  Reason if not Attempted: Not attempted due to medical condition or safety concerns  CARE Score: 88  Discharge Goal: Supervision or touching assistance  4 Steps  Reason if not Attempted: Not attempted due to medical condition or safety concerns  CARE Score: 88  Discharge Goal: Supervision or touching assistance  12 Steps  Reason if not Attempted: Not attempted due to medical condition or safety concerns  CARE Score: 88  Discharge Goal: Supervision or touching assistance  Picking Up Object  Reason if not Attempted: Not attempted due to medical condition or safety concerns  CARE Score: 88  Discharge Goal: Supervision or touching assistance  Wheelchair Ability  Uses a Wheelchair and/or Scooter?: No    SPEECH THERAPY:    Diet Level:ADULT DIET; Dysphagia - Soft and Bite Sized; Mildly Thick (Nectar)    Assessment:  Pt is limited in early progress due to onset of UTI and pneumonia which has increased dysphagia and aspiration risk. MBS was completed, although no direct aspiration was viewed, airway penetration was consistent with liquid consistencies therefore downgraded diet to mildly thick liquids. Pt with limited initiation and flat affect, impacting processing speed, comprehension and verbal output. Memory impairments were present at baseline but enhanced s/p CVA. Pt also presents with mild dysarthria.      OCCUPATIONAL THERAPY:    ADL:   ADL  Grooming: Setup, Minimal assistance (oral care while seated in w/c at sink)  UE Bathing: Setup, Maximum assistance  LE Bathing: Setup, Maximum assistance  UE Dressing: Dependent/Total  LE Dressing: Dependent/Total  Toileting: Setup, Dependent/Total  Additional Comments: Pt incontinent of stool upon entry. Completed rolling L/R to complete pericare/LB dressing. 2 person assist for completion. Pt completed grooming tasks while seated at sink    QM:  Eating  Assistance Needed: Setup or clean-up assistance  Comment: finely cut up turkey sandwich  CARE Score: 5  Discharge Goal: Independent  Oral Hygiene  Assistance Needed: Partial/moderate assistance  Comment: cues & encouragement w/ mouth swab  CARE Score: 3  Discharge Goal: Independent  Toileting Hygiene  Assistance Needed: Dependent  CARE Score: 1  Discharge Goal: Supervision or touching assistance  Toilet Transfer  Assistance Needed: Dependent  CARE Score: 1  Shower/Bathe Self  Assistance Needed: Dependent  CARE Score: 1  Discharge Goal: Supervision or touching assistance  Upper Body Dressing  Assistance Needed: Dependent  CARE Score: 1  Discharge Goal: Independent  Lower Body Dressing  Assistance Needed: Dependent  CARE Score: 1  Discharge Goal: Set-up or clean-up assistance  Putting On/Taking Off Footwear  Assistance Needed: Dependent  CARE Score: 1  Discharge Goal: Set-up or clean-up assistance    NUTRITION:  Weight: 226 lb 8 oz (102.7 kg) / Body mass index is 29.08 kg/m². Diet Order: Dysphagia soft & bite-sized, mildly thick liquids (Nectar)    Supplements:NA    Po intake greater than 50% of meals. Please see nutrition note for details. NURSING:    Risk for Readmission: 11%    [place] Fall Risk Score: 75  Wounds/Incisions/Ulcers: Barrier cream to bottom - stage 1.  Focusing on preventing further breakdown through frequent position changes, toileting in advance of need, and proper nutrition  Medication Review: medications reviewed daily with patient & family when present  Pain: denies pain  Consultations/Labs/X-rays: Recent UA & C&S, recent CXR. BMP & CBC bi-weekly    Patient/Family Education provided by team:    Discharge Plan   Estimated Length of Stay:17 days  Destination: home health  Pass:No  Services at Discharge: PT, OT   Equipment at Discharge: TBD with progress  Factors facilitating achievement of predicted outcomes: cooperative, family support  Barriers to the achievement of predicted outcomes: cognition  Patient Goals:pt unable to assess, To go home,     Plan of Care  Interdisciplinary Individualized Plan of Care Review:    Continue Current Plan of Care:  Yes  Modifications:_____________________________    Rehab Team Members in attendance for Team Conference:  Roman Padron, MSW, LSW    Teri Cunningham, RD, LD    Domitila Read, OTR/LUIS Persaud, PT, DPT    Aung Guerrero M.A., Devin Bose, MSN, RN, [address] PT, DPT,     I approve the established interdisciplinary plan of care as documented within the medical record of Facundo Zabala MD  Electronically signed by Heidy Zabala MD on 6/29/2021 at 11:51 AM

## 2021-06-28 NOTE — PROGRESS NOTES
Evelia Lau  6/28/2021  [id number]    Chief Complaint: Acute embolic stroke ([facility] [address]    Subjective:   Diagnosed with PNA over the weekend. No current complaints. Labs reviewed. BP above goal. + BM this am.    ROS: No CP, SOB, dyspnea    Objective:  Patient Vitals for the past 24 hrs:   BP Temp Temp src Pulse Resp SpO2   06/28/21 0805 (!) 165/89 97.9 °F (36.6 °C) Oral 53 16 94 %   06/27/21 2200 (!) 159/85 97.9 °F (36.6 °C) Oral 68 16 92 %     Gen: No distress, pleasant. Resting in bed  HEENT: Normocephalic, atraumatic. CV: Regular rate and rhythm. No MRG   Resp: No respiratory distress. CTAB. Abd: Soft, nontender nondistended  Ext: No edema. Neuro: Alert, poor initiation, appropriately interactive. Laboratory data: Available via EMR. Therapy progress:  PT  Position Activity Restriction  Other position/activity restrictions: 81year old male with a history of CAD, dementia, PVD who was admitted to [facility] on 6/19 with left-sided weakness. He was recently evaluated at this facility for an episode of syncope. Work-up revealed a right watershed ischemic stroke in the right frontal and parietal regions. He was initiated on Plavix in addition to aspirin and statin. A1c 5.3. LDL 88. Echo was unremarkable for an embolic source. He was evaluated by therapy and suggested to continue in an inpatient setting prior to returning home. He has no current complaints today. He is currently sitting up with therapy and having intermittent episodes of increased respirations with decreased heart rate. Objective     Sit to Stand: 2 Person Assistance (Ranges from max (A) of 1 to max (A) of 1 + min (A) of 1.   Completions from recliner, // bars, and w/c => stair HR.)  Stand to sit: 2 Person Assistance  Bed to Chair: Dependent/Total (maxi-move)     OT  PT Equipment Recommendations  Equipment Needed: Yes  Other: TBD with progress     Assessment        SLP                Body mass index is 29.08 kg/m². Assessment:  Patient Active Problem List   Diagnosis    Essential hypertension    Hyperlipidemia    Palpitations    Family history of premature CAD    Shoulder pain    Knee pain    Late onset Alzheimer's disease without behavioral disturbance ([facility] [address]    Acute renal failure (ARF) (HCC)    Benign prostatic hyperplasia (BPH) with post-void dribbling    Near syncope    Dementia due to Alzheimer's disease ([facility] [address]    Acute embolic stroke ([facility] [address]       Plan:   Right frontal and parietal watershed infarcts: ASA, statin. Plavix for 21 days. PT/OT/SLP. Event monitor mentioned in discharge notes but no monitor with patient. We will follow up and ensure placement given his breathing episodes below.     Intermittent tachypnea: HR decreased during episodes. Monitor     CAD: ASA, statin      HTN: atenolol      Dementia: SLP     Urinary retention: flomax, proscar. Recently required cm placement    PNA: suspect aspiration related. Levaquin     Bowels: Per protocol  Bladder: Per protocol   Sleep: Trazodone provided prn. Pain: tylenol, tramadol   DVT PPx: Lovenox   ELOS: 17-21    Electronically signed by Joyce Urbina MD on 6/28/2021 at 8:52 AM    * This document was created using dictation software. While all precautions were taken to ensure accuracy, errors may have occurred. Please disregard any typographical errors.

## 2021-06-28 NOTE — PROGRESS NOTES
Physical Therapy  Facility/Department: [facility] ACUTE REHAB UNIT  Daily Treatment Note  NAME: Serafin Whitley  : 1935  MRN: [id number]    Date of Service: 2021    Discharge Recommendations:  24 hour supervision or assist, Home with Home health PT, S Level 3   PT Equipment Recommendations  Equipment Needed: Yes  Other: TBD with progress    Assessment   Body structures, Functions, Activity limitations: Decreased functional mobility ; Decreased balance;Decreased coordination;Decreased endurance;Decreased strength;Decreased posture;Decreased ADL status; Decreased safe awareness;Decreased ROM  Assessment: Pt continues to demonstrate decreased midline posture with L lean with decreased sense of body positioning. Pt also with decreased command following with fatigue. Improved pstural correct in response to tc, but pt unable to maintain correction. Prognosis: Fair  PT Education: Goals;PT Role;Plan of Care;Transfer Training;General Safety; Functional Mobility Training  Patient Education: Pt will require reinforcement secondary to cognitive deficits. Barriers to Learning: cognition  REQUIRES PT FOLLOW UP: Yes  Activity Tolerance  Activity Tolerance: Patient limited by fatigue;Patient limited by endurance; Patient limited by cognitive status  Activity Tolerance: Increased time for verbal and physical response to commands. Patient Diagnosis(es): CVA. has a past medical history of Dementia ([address] HTN (hypertension), Hyperlipidemia, and PONV (postoperative nausea and vomiting). has a past surgical history that includes Knee arthroscopy; Colonoscopy; eye surgery (Right, 2014); Cystoscopy (2018); and TURP (2018).     Restrictions  Restrictions/Precautions  Restrictions/Precautions: Fall Risk, Modified Diet  Required Braces or Orthoses?: No  Position Activity Restriction  Other position/activity restrictions: 81year old male with a history of CAD, dementia, PVD who was admitted to [facility] on 6/19 with left-sided weakness. He was recently evaluated at this facility for an episode of syncope. Work-up revealed a right watershed ischemic stroke in the right frontal and parietal regions. He was initiated on Plavix in addition to aspirin and statin. A1c 5.3. LDL 88. Echo was unremarkable for an embolic source. He was evaluated by therapy and suggested to continue in an inpatient setting prior to returning home. He has no current complaints today. He is currently sitting up with therapy and having intermittent episodes of increased respirations with decreased heart rate. Subjective   General  Chart Reviewed: Yes  Additional Pertinent Hx: dementia, HTN, DM  Response To Previous Treatment: Patient with no complaints from previous session. Family / Caregiver Present: No  Subjective  Subjective: Pt in bed on arrival.  States he is doing well. Pain Screening  Patient Currently in Pain: Denies  Vital Signs  Patient Currently in Pain: Denies       Orientation     Cognition      Objective   Bed mobility  Rolling to Left: Dependent/Total (maxA of 2)  Rolling to Right: Dependent/Total (maxA of 2)  Supine to Sit: Dependent/Total (maxi hubert)  Transfers  Sit to Stand: 2 Person Assistance;Dependent/Total (maxA of 2 in parallel bars)  Stand to sit: 2 Person Assistance;Dependent/Total (maxA of 2 in parallel bars)  Bed to Chair: Dependent/Total;2 Person Assistance (maxA of 2 in parallel bars)  Lateral Transfers: 2 Person Assistance;Dependent/Total (maxA of 2 in parallel bars)  Ambulation  Ambulation?: No  Stairs/Curb  Stairs?: No     Balance  Posture: Poor  Sitting - Static: Poor  Sitting - Dynamic: Poor;-  Standing - Static: Poor;-  Standing - Dynamic: Poor;-  Comments: Pt required maxA -totalA to sit EOM with no righting reaction noted. Required maxA of 2 to maintain standing with significant L lean. Pt able to correct L lean in sitting with max cues, but unable to maintain.             Comment: Performed rolling for pericare. Performed maxi-hubert transfer to w/c and oral care at sink with maxA for midline posture. Performed standing in parallel bars as documented above. Performed activities sitting EOM with activities including reaching cross body and forward with max-totalA for sitting balance,  Minimal activation noted of trunk. No righting reactions noted. Pt returned to room and remained in w/c with alarm on and all needs in reach.               G-Code     OutComes Score                                                     AM-PAC Score             Goals  Short term goals  Time Frame for Short term goals: 1-2 weeks  Short term goal 1: Pt will perform all bed mobility with maxA of 1  Short term goal 2: Pt will perform SPT with maxA of 1  Short term goal 3: Pt will ambulate 8' with LRAD with maxA of 1  Short term goal 4: Pt will negotiate 1 step with LRAD and maxA of 1  Long term goals  Time Frame for Long term goals : 2-3 weeks  Long term goal 1: Pt will perform all bed mobility with CGA  Long term goal 2: Pt will perform all transfers with CGA  Long term goal 3: Pt will ambulate 48' with LRAD CGA  Long term goal 4: Pt will negotiate 12 steps with kapil  Patient Goals   Patient goals : pt unable to assess    Plan    Plan  Times per week: 60 minutes a day 5 days a week  Current Treatment Recommendations: Strengthening, Transfer Training, Endurance Training, Neuromuscular Re-education, Patient/Caregiver Education & Training, ROM, Wheelchair Mobility Training, Manual Therapy - Soft Tissue Mobilization, Equipment Evaluation, Education, & procurement, Balance Training, Gait Training, Functional Mobility Training, Stair training, Safety Education & Training, Positioning, ADL/Self-care Training  Safety Devices  Type of devices: Call light within reach, Chair alarm in place, Left in chair, Telesitter in use, Gait belt, All fall risk precautions in place  Restraints  Initially in place: No     Therapy Time Individual Concurrent Group Co-treatment   Time In       0830   Time Out       0930   Minutes       60        Timed Code Treatment Minutes:  60    Total Treatment Minutes:  [address] [place], [place] [address]

## 2021-06-28 NOTE — PROGRESS NOTES
Occupational Therapy  Facility/Department: [facility] ACUTE REHAB UNIT  Daily Treatment Note  NAME: Liana Postal  : 1935  MRN: [id number]    Date of Service: 2021    Discharge Recommendations:  Continue to assess pending progress, Patient would benefit from continued therapy after discharge  OT Equipment Recommendations  Equipment Needed: No  Other: Continue to assess pending pt progress    Assessment   Performance deficits / Impairments: Decreased functional mobility ; Decreased endurance;Decreased coordination;Decreased posture;Decreased ADL status; Decreased ROM; Decreased strength;Decreased balance;Decreased vision/visual deficit; Decreased high-level IADLs;Decreased safe awareness;Decreased cognition;Decreased fine motor control  Assessment: 81 y.o. male presents w/ the above deficits which are impacting his occupational performance. Pt would benefit from continued therapy during inpatient stay in order to maximize safety and independence upon discharge. Treatment Diagnosis: Multiple performance deficits s/p CVA  OT Education: OT Role;Plan of Care;ADL Adaptive Strategies;Transfer Training;Orientation  Patient Education: Pt is not an independent learner  Barriers to Learning: Cognition  REQUIRES OT FOLLOW UP: Yes  Activity Tolerance  Activity Tolerance: Patient Tolerated treatment well  Activity Tolerance: Pt w/ significant pushing behavior/rigidity during session significantly limiting pt's ability to complete therapy  Safety Devices  Safety Devices in place: Yes  Type of devices: All fall risk precautions in place;Call light within reach; Chair alarm in place;Gait belt;Patient at risk for falls; Left in chair;Nurse notified         Patient Diagnosis(es): There were no encounter diagnoses. has a past medical history of Dementia ([address] HTN (hypertension), Hyperlipidemia, and PONV (postoperative nausea and vomiting). has a past surgical history that includes Knee arthroscopy;  Colonoscopy; eye surgery assist x1  Short term goal 2: Bathing w/ assist x1  Short term goal 3: LB dressing w/ assist x1  Short term goal 4: Toileting w/ assist x1  Short term goal 5: UB dressing w/ Min A  Long term goals  Time Frame for Long term goals : 3-4 weeks  Long term goal 1: Functional transfer for ADL completion w/ supervision  Long term goal 2: Bathing w/ supervision  Long term goal 3: LB dressing w/ supervision  Long term goal 4: Toileting w/ supervision  Long term goal 5: UB dressing w/ supervision  Patient Goals   Patient goals :  To go home       Therapy Time   Individual Concurrent Group Co-treatment   Time In       0830   Time Out       0930   Minutes       60        Timed Code Treatment Minutes:  60 Minutes    Total Treatment Minutes:  60 minutes       Genevieve Geiger, OT   Genevieve Geiger OTR/L, [facility] #[id number]

## 2021-06-28 NOTE — PLAN OF CARE
Problem: Falls - Risk of:  Goal: Will remain free from falls  Description: Will remain free from falls  6/28/2021 1147 by Sharmin Sainz RN  Outcome: Ongoing     Problem: Falls - Risk of:  Goal: Absence of physical injury  Description: Absence of physical injury  Outcome: Ongoing     Problem: Skin Integrity:  Goal: Will show no infection signs and symptoms  Description: Will show no infection signs and symptoms  6/28/2021 1147 by Sharmin Sainz RN  Outcome: Ongoing     Problem: Skin Integrity:  Goal: Absence of new skin breakdown  Description: Absence of new skin breakdown  Outcome: Ongoing     Problem: Confusion - Acute:  Goal: Absence of continued neurological deterioration signs and symptoms  Description: Absence of continued neurological deterioration signs and symptoms  Outcome: Ongoing     Problem: Confusion - Acute:  Goal: Mental status will be restored to baseline  Description: Mental status will be restored to baseline  Outcome: Ongoing     Problem: Discharge Planning:  Goal: Ability to perform activities of daily living will improve  Description: Ability to perform activities of daily living will improve  Outcome: Ongoing     Problem: Discharge Planning:  Goal: Participates in care planning  Description: Participates in care planning  Outcome: Ongoing     Problem: Injury - Risk of, Physical Injury:  Goal: Will remain free from falls  Description: Will remain free from falls  6/28/2021 1147 by Sharmin Sainz RN  Outcome: Ongoing     Problem: Injury - Risk of, Physical Injury:  Goal: Absence of physical injury  Description: Absence of physical injury  Outcome: Ongoing     Problem: IP BLADDER/VOIDING  Goal: STG - patient will be able to empty bladder  Outcome: Ongoing

## 2021-06-28 NOTE — PROGRESS NOTES
ACUTE REHAB UNIT  SPEECH/LANGUAGE PATHOLOGY      [x] Daily  [] Weekly Care Conference Note  [] Discharge    Patient:Reno Calvert     :1935  ANNMARIE:[id number]  Room #: HQX-6749/4340-65   Rehab Dx/Hx: Acute embolic stroke [facility]) [M75.4]  Date of Admit: 2021      Precautions: falls and aspirations  Home situation: Per pt's daughter Matt Osorio) pt lives with his grandson (22years old). Johan Stoddard reports plan is for her and her family to move into his place to provide more assistance. ST Dx: Mild-moderate oropharyngeal dysphagia; Severe cognitive linguistic deficits; Moderate dysarthria / dysphonia    Daily Treatment Info:   Date: 2021     Tx session 1   Tx session 2     Pain  None reported    Subjective     MBS completed - see report for details. Pt up in chair for session with his daughter present. Pt with flat affect and limited initiation. Pt's daughter confirmed background information. Reported he was more alert and engaged prior to rehab admission. Stated memory was poor prior to admission but he was still able to problem solve and have intelligent conversations. Objective:  Goals     Pt will tolerate recommended diet and advanced trials with use of compensatory swallow strategies provided with < mod cues with no overt clinical s/s of aspiration or noted difficulty. Pt's daughter reported pt ate minimal amount of lunch but did not have any difficulty with swallowing (watermelon). Reported she has not noticed any difficulty with eating/drinking. Reviewed imaging results MBS and diet recommendations (downgrade to mildly thick liquids; continue with dysphagia soft and bite sized solids). Reviewed restrictions (hard, crunchy solids, mixed consistencies) and provided recommendations for alternative foods. Discussed the needs to closely monitor intake while on altered diet. Reviewed increased risk for aspiration with UTI, pneumonia and physical limitations.  Pt and daughter verbalized understanding and agreement. Pt accepted small amount (4 oz) of mildly thick liquid   - no overt s/s of aspiration were assessed   - pt was able to execute chin tuck given min cues   - pt's daughter agreed that frequent verbal cues are required in order for pt to carry over any new swallowing strategies; therefore, recommend 1:1 feeding assistance      Pt will respond and/or initiate action upon verbal prompt within 3 seconds in 4 out of 5 opportunities without repetition. Pt required >5 seconds on approximately 75% of background information or subjective yes/no questions. Pt will use visual aids/ strategies to state orientation to time, place, situation with 100% accuracy across 3 consecutive sessions. Pt oriented to:   - current year (delayed response)   - current place (delayed response)     Pt's daughter reported they brought in his calendar from home that they frequently used for pt to recall daily visitors upcoming events. Pt will improve oral motor function, articulatory precision and breath support in connected speech via graded tasks to >80% acc with use of speech strategies. Goal not targeted this session. Pt will improve verbal initiation and thought organization for extended utterances (i.e. related to personal hx/ recall of recent events, etc.) >75% acc. Pt's responses to personal/background information were all  < 3 word utterances with 73% accuracy:   - named 2/4 children   - provided his current home town   - unable to recall living situation; reported he lives with his wife and two daughters)   - provided single word response to number of siblings   - provided the name of his street where he grew up; unable to name the town     Pt's daughter reported some of this information was becoming more difficult for him to remember prior to admission.        Patient will complete fx problem solving tasks and demonstrate insight into limitations and impact on daily functioning with >75% acc, min cues. Goal not targeted this session. Other areas targeted:     Education:    See above. Extended time for completion of dysphagia education with pt and pt's daughter Lonestela Rang). Pt with limited comprehension/carry over; daughter verbalized understanding and agreement. Safety Devices: [x] Call light within reach  [x] Chair alarm activated  [] Bed alarm activated  [x] Other: camera in room [x] Call light within reach  [x] Chair alarm activated  [] Bed alarm activated  [x] Other: camera in room; pt's daughter present      Assessment:   Speech Therapy Diagnosis  Cognitive Diagnosis: Pt presents with severe cognitive linguistic deficits due to reduced orienation, reduced processing speed, reduced insight into current situation, reduced immediate/ working/ short term memory with fx problem solving difficulty. Per chart review pt with baseline dementia, did not complete IADLs at home prior to CVA but was able to be safely left alone with daily check ins from family. Aphasia Diagnosis: Significantly reduced processing speed/ difficulty following multi-step commands. Unable to discern if due to pt being [place] vs reduced auditory comprehension/ initiation, requires further assessment. Pt c/o word finding diffiuclty, pt with minimal verbal output during evaluation, requires ongoing assessment for goal generation. Speech Diagnosis: Moderate dysarthria/ dysphonia, pt with intermittent periods of tachypnea and reduced breath support impacting intelligibility at the short phrase/ sentence level with reduced articulatory precision, pt with hoarse, gravely wet vocal quality    Current Diet Order: ADULT DIET; Dysphagia - Soft and Bite Sized; Mildly Thick (Nectar)            Plan:     Frequency:  5days/week   60 minutes/day  Discharge Recommendations:   Barriers: Pre-existing cognitive deficit; severity of deficits;  Limited initiation  Discharge Recommendations:  [] Home independently  [x] Home with assistance [x]  24 hour supervision  [] SNF [x] Other: TBD  Continued SLP Treatment:  [x] Yes [] No [] TBD based on progress while on ARU [] Vital Stim indicated [] Other:   Estimated discharge date: 7/16/21    Type of Total Treatment Minutes   Session 1   Session 2   Time In See MBS report  [id number]   Time Out  1330   Timed Code Minutes   30   Individual Treatment Minutes   45   Co-Treatment Minutes      Group Treatment Minutes      Concurrent Treatment Minutes        TOTAL DAILY MINUTES:  45  See MBS report for additional daily minutes     Electronically Signed by     Jorge Rand M.A.  CCC-SLP STanvirPTanvir [id number]  Speech-Language Pathologist [phone]  6/28/2021 2:20 PM

## 2021-06-28 NOTE — PLAN OF CARE
Problem: Falls - Risk of:  Goal: Will remain free from falls  Description: Will remain free from falls  6/27/2021 2206 by Esetlle RN  Outcome: Ongoing   Education Provided as followed:  \"Family/Patient made aware of the tailored interventions falls plan using the TIPS TOOL.    Problem: Skin Integrity:  Goal: Will show no infection signs and symptoms  Description: Will show no infection signs and symptoms  6/27/2021 2206 by Estelle RN  Outcome: Ongoing     Problem: Injury - Risk of, Physical Injury:  Goal: Will remain free from falls  Description: Will remain free from falls  6/27/2021 2206 by Estelle, RN  Outcome: Ongoing

## 2021-06-29 PROCEDURE — 97530 THERAPEUTIC ACTIVITIES: CPT

## 2021-06-29 PROCEDURE — 1280000000 HC REHAB R&B

## 2021-06-29 PROCEDURE — 92526 ORAL FUNCTION THERAPY: CPT

## 2021-06-29 PROCEDURE — 97110 THERAPEUTIC EXERCISES: CPT

## 2021-06-29 PROCEDURE — 97130 THER IVNTJ EA ADDL 15 MIN: CPT

## 2021-06-29 PROCEDURE — 6370000000 HC RX 637 (ALT 250 FOR IP): Performed by: PHYSICAL MEDICINE & REHABILITATION

## 2021-06-29 PROCEDURE — 97129 THER IVNTJ 1ST 15 MIN: CPT

## 2021-06-29 PROCEDURE — 97535 SELF CARE MNGMENT TRAINING: CPT

## 2021-06-29 PROCEDURE — 6360000002 HC RX W HCPCS: Performed by: PHYSICAL MEDICINE & REHABILITATION

## 2021-06-29 RX ORDER — SENNA AND DOCUSATE SODIUM 50; 8.6 MG/1; MG/1
2 TABLET, FILM COATED ORAL DAILY PRN
Status: DISCONTINUED | OUTPATIENT
Start: 2021-06-29 | End: 2021-07-16 | Stop reason: HOSPADM

## 2021-06-29 RX ADMIN — TAMSULOSIN HYDROCHLORIDE 0.4 MG: 0.4 CAPSULE ORAL at 08:02

## 2021-06-29 RX ADMIN — LEVOFLOXACIN 500 MG: 500 TABLET, FILM COATED ORAL at 08:02

## 2021-06-29 RX ADMIN — ASPIRIN 81 MG: 81 TABLET, CHEWABLE ORAL at 08:01

## 2021-06-29 RX ADMIN — ATORVASTATIN CALCIUM 40 MG: 40 TABLET, FILM COATED ORAL at 20:15

## 2021-06-29 RX ADMIN — ATENOLOL 25 MG: 50 TABLET ORAL at 08:02

## 2021-06-29 RX ADMIN — ENOXAPARIN SODIUM 40 MG: 40 INJECTION SUBCUTANEOUS at 08:01

## 2021-06-29 RX ADMIN — LISINOPRIL 5 MG: 5 TABLET ORAL at 08:02

## 2021-06-29 RX ADMIN — FINASTERIDE 5 MG: 5 TABLET, FILM COATED ORAL at 08:02

## 2021-06-29 RX ADMIN — CLOPIDOGREL BISULFATE 75 MG: 75 TABLET ORAL at 08:02

## 2021-06-29 ASSESSMENT — PAIN SCALES - GENERAL
PAINLEVEL_OUTOF10: 0

## 2021-06-29 NOTE — FLOWSHEET NOTE
Called Dr Jose Verdin office @ [phone] to ask about a cardiac event monitor . The device was  sent to his home on June 24 th.  Spoke with his daughter Gentry Haynes and she is going by his house to see if the device has arrived and then bring it to the hospital

## 2021-06-29 NOTE — PROGRESS NOTES
Occupational Therapy  Facility/Department: [facility] ACUTE REHAB UNIT  Daily Treatment Note  NAME: Toni Luna  : 1935  MRN: [id number]    Date of Service: 2021    Discharge Recommendations:  Continue to assess pending progress, Patient would benefit from continued therapy after discharge  OT Equipment Recommendations  Equipment Needed: No  Other: Continue to assess pending pt progress    Assessment   Performance deficits / Impairments: Decreased functional mobility ; Decreased endurance;Decreased coordination;Decreased posture;Decreased ADL status; Decreased ROM; Decreased strength;Decreased balance;Decreased vision/visual deficit; Decreased high-level IADLs;Decreased safe awareness;Decreased cognition;Decreased fine motor control  Assessment: 81 y.o. male presents w/ the above deficits which are impacting his occupational performance. Pt would benefit from continued therapy during inpatient stay in order to maximize safety and independence upon discharge. Treatment Diagnosis: Multiple performance deficits s/p CVA  OT Education: OT Role;Plan of Care;ADL Adaptive Strategies;Transfer Training;Orientation  Patient Education: Pt is not an independent learner  Barriers to Learning: Cognition  REQUIRES OT FOLLOW UP: Yes  Activity Tolerance  Activity Tolerance: Patient Tolerated treatment well  Safety Devices  Safety Devices in place: Yes  Type of devices: All fall risk precautions in place;Call light within reach; Chair alarm in place;Gait belt;Patient at risk for falls; Left in chair;Nurse notified         Patient Diagnosis(es): CVA   has a past medical history of Dementia ([address] HTN (hypertension), Hyperlipidemia, and PONV (postoperative nausea and vomiting). has a past surgical history that includes Knee arthroscopy; Colonoscopy; eye surgery (Right, 2014); Cystoscopy (2018); and TURP (2018).     Restrictions  Restrictions/Precautions  Restrictions/Precautions: Fall Risk, Modified Diet  Required Braces or Orthoses?: No  Position Activity Restriction  Other position/activity restrictions: 81year old male with a history of CAD, dementia, PVD who was admitted to [facility] on 6/19 with left-sided weakness. He was recently evaluated at this facility for an episode of syncope. Work-up revealed a right watershed ischemic stroke in the right frontal and parietal regions. He was initiated on Plavix in addition to aspirin and statin. A1c 5.3. LDL 88. Echo was unremarkable for an embolic source. He was evaluated by therapy and suggested to continue in an inpatient setting prior to returning home. He has no current complaints today. He is currently sitting up with therapy and having intermittent episodes of increased respirations with decreased heart rate. Subjective   General  Chart Reviewed: Yes  Patient assessed for rehabilitation services?: Yes  Response to previous treatment: Patient with no complaints from previous session  Family / Caregiver Present: No  Diagnosis: CVA  Subjective  Subjective: Pt seated in recliner  upon entry, agreeable to OT/PT co-treatment  Vital Signs  Patient Currently in Pain: No     Objective    ADL  UE Bathing: Setup; Moderate assistance  LE Bathing: Setup;Maximum assistance  UE Dressing: Maximum assistance  LE Dressing: Dependent/Total  Toileting: Setup;Dependent/Total  Additional Comments: Completed UB/LB bathing w/ warmed wipes while seated in recliner. Pt donned gown/pants while seated. Pt requested to use urinal, assist w/ management-pt continent of urine. Sit>stand transfers completed w/ assist x2 throughout. Balance  Sitting Balance: Moderate assistance  Standing Balance: Dependent/Total  Standing Balance  Time: ~8 min total  Activity: Static stance in parallel bars w/ assist x3 (completed 3 attempts), 2 stances in standing frame  Comment: Pt stood for ~5 min + ~3 min in standing frame while completing ROM arc.  Pt was able to use RUE to move rings from L>R, pt was able to visually track rings as he transitioned them. Good attention throughout task-second person assist for standing balance during completion     Transfers  Sit to stand: 2 Person assistance;Dependent/Total  Stand to sit: 2 Person assistance;Dependent/Total  Transfer Comments: Max A x2      Cognition  Overall Cognitive Status: WNL  Arousal/Alertness: Delayed responses to stimuli  Following Commands: Follows one step commands with repetition; Follows one step commands with increased time  Attention Span: Attends with cues to redirect  Memory: Decreased recall of biographical Information;Decreased short term memory;Decreased long term memory;Decreased recall of recent events  Safety Judgement: Decreased awareness of need for safety;Decreased awareness of need for assistance  Problem Solving: Decreased awareness of errors;Assistance required to identify errors made;Assistance required to correct errors made  Insights: Decreased awareness of deficits  Initiation: Requires cues for all  Sequencing: Requires cues for all  Cognition Comment: Increased processing time for verbal commands; pt w/ increased attention to task this date      Plan   Plan  Times per week: 60 min; 5-7x  Times per day: Daily  Current Treatment Recommendations: Strengthening, Patient/Caregiver Education & Training, Home Management Training, Equipment Evaluation, Education, & procurement, Balance Training, Neuromuscular Re-education, Functional Mobility Training, Endurance Training, Safety Education & Training, Self-Care / ADL    Goals  Short term goals  Time Frame for Short term goals: 1-2 weeks  Short term goal 1: Functional transfer for ADL completion w/ assist x1  Short term goal 2: Bathing w/ assist x1  Short term goal 3: LB dressing w/ assist x1  Short term goal 4: Toileting w/ assist x1  Short term goal 5: UB dressing w/ Min A  Long term goals  Time Frame for Long term goals : 3-4 weeks  Long term goal 1: Functional transfer for ADL completion w/ supervision  Long term goal 2: Bathing w/ supervision  Long term goal 3: LB dressing w/ supervision  Long term goal 4: Toileting w/ supervision  Long term goal 5: UB dressing w/ supervision  Patient Goals   Patient goals :  To go home       Therapy Time   Individual Concurrent Group Co-treatment   Time In       0830   Time Out       0930   Minutes       60        Timed Code Treatment Minutes:  60 Minutes    Total Treatment Minutes:  60 minutes       MEREDITH Barrios OTR/L, [place] #[id number]

## 2021-06-29 NOTE — CARE COORDINATION
Team conference held today. Spoke with patient and patient's daughter via the phone to discuss progress with therapy, barriers to discharge, and plans to return home. Estimated discharge date set for 7/16/2021. Patient's discharge plan to be determined with patient's progress. SW will continue to follow for support and discharge planning.     Electronically signed by WING Salvador on 6/29/2021 at 3:44 PM

## 2021-06-29 NOTE — PROGRESS NOTES
ACUTE REHAB UNIT  SPEECH/LANGUAGE PATHOLOGY      [x] Daily  [x] Weekly Care Conference Note  [] Discharge    Patient:Reno Macedo     :1935  HEQ:[id number]  Room #: EMB-8006/5921-67   Rehab Dx/Hx: Acute embolic stroke [facility]) [A27.2]  Date of Admit: 2021      Precautions: falls and aspirations  Home situation: Per pt's daughter Elías Nair) pt lives with his grandson (22years old). Vianney Workman reports plan is for her and her family to move into his place to provide more assistance. ST Dx: Mild-moderate oropharyngeal dysphagia; Severe cognitive linguistic deficits; Moderate dysarthria / dysphonia    Daily Treatment Info:   Date: 2021     Tx session 1   Tx session 2     Pain Requesting to stand up due discomfort. Willing to wait until end of session for re adjustment with PT/OT. Reported being uncomfortable in chair. Notified PCA at the end of session of pt's request to be repositioned. Weekly Update : Pt is limited in early progress due to onset of UTI and pneumonia which has increased dysphagia and aspiration risk. MBS was completed, although no direct aspiration was viewed, airway penetration was consistent with liquid consistencies therefore downgraded diet to mildly thick liquids. Pt with limited initiation and flat affect, impacting processing speed, comprehension and verbal output. Memory impairments were present at baseline but enhanced s/p CVA. Pt also presents with mild dysarthria. Subjective     Pt up in chair for session. More engaged and improved responses in comparison to yesterday. Pt up in chair for session. Perseverative, slow processing, and flat affect, suspect pt was more fatigued than session 1 after participating with PT/OT. Reduced breath coordination initially and wet vocal quality. O2 saturation 88% on initial placement of pulse ox, quickly increased to 94-97%. RN also reported coughing/choking episode while drinking water.       Objective:  Goals Pt will tolerate recommended diet and advanced trials with use of compensatory swallow strategies provided with < mod cues with no overt clinical s/s of aspiration or noted difficulty. Pt accepted breakfast meal (soft/chopped solids, puree, mildly thick liquid)   - pt tolerated all consistencies without overt s/s of aspiration  - mastication mildly prolonged   - mild oral residue; cleared with liquid rinse   - timing and strength of laryngeal elevation mildly reduced   - pt consumed approximately 80% of meal, Independently initiated self feeding with set up assistance   - pt impulsive with bite/sip size and feeding rate     Facilitated trial of thin liquid after meal - pt with mildly reduced timing and strength of laryngeal elevation   - no overt cough/throat clearing however increased work of breathing s/p trials    Pt accepted small trial of thin liquid (water)   - pt tolerated without overt s/s of aspiration   - breath quality was stable after presentations     Ongoing    Pt will respond and/or initiate action upon verbal prompt within 3 seconds in 4 out of 5 opportunities without repetition. Pt responded to approximately 50% of presentations within 3 second time frame. Required mod cues via repetition and reminders to     Pt attended to one step written directions on 38% (3/7)  of presentations   - required mod-max cues (verbal, tactile and repetition), to maintain attention to remaining presentations   - attempted to minimize presentation however did not improve accuracy     Ongoing        Pt will use visual aids/ strategies to state orientation to time, place, situation with 100% accuracy across 3 consecutive sessions. Pt Independently utilized digital clock for accurate orientation to temporal questions. Pt oriented to place and situation on 2/3 attempts. Pt Independently utilized digital clock for accurate orientation to temporal questions.      Oriented to Place   Disoriented to situation (stroke)     Ongoing      Pt will improve oral motor function, articulatory precision and breath support in connected speech via graded tasks to >80% acc with use of speech strategies. Targeted breath support coordination via incentive spirometer   - required mod cues for coordination and comprehension of directions (inhalation vs exhalation)   - pt able to propel a max of 1500 mL on 10 attempts    Goal not targeted this session. Ongoing    Pt will improve verbal initiation and thought organization for extended utterances (i.e. related to personal hx/ recall of recent events, etc.) >75% acc. Extended utterances of personalized information   - Pt produced responses to 54% of personalized/ subjective information   - pt with extended responses with lost thought processes   - able to respond to mod-max cues to maintain topic or produce additional/extended responses    Extended utterances of personalized information   - unable to generate names of siblings  - tasks discontinued due to time restraints     Verbal description of family photos   - limited to 1-2 word utterances   - provided mod-max cues for thought expansion (\"I don't remember\")     Ongoing      Patient will complete fx problem solving tasks and demonstrate insight into limitations and impact on daily functioning with >75% acc, min cues. Goal not targeted this session. Pt limited insight into physical deficits   - requesting to stand up and walk to relieve discomfort; unaware of debility and level of assistance required     Ongoing    Other areas targeted:     Education:   Provided education re rationale for tasks and plan of care. Pt verbalized understanding however limited in comprehension/ carry over. Provided education re rationale for tasks and plan of care. Pt verbalized understanding however limited in comprehension/ carry over.       Safety Devices: [x] Call light within reach  [x] Chair alarm activated  [] Bed alarm activated  [x] Other: camera in room [x] Call light within reach  [x] Chair alarm activated  [] Bed alarm activated  [x] Other: camera in room; notified PCA of pt's request to be transferred out of chair      Assessment:   Speech Therapy Diagnosis  Cognitive Diagnosis: Pt presents with severe cognitive linguistic deficits due to reduced orienation, reduced processing speed, reduced insight into current situation, reduced immediate/ working/ short term memory with fx problem solving difficulty. Per chart review pt with baseline dementia, did not complete IADLs at home prior to CVA but was able to be safely left alone with daily check ins from family. Aphasia Diagnosis: Significantly reduced processing speed/ difficulty following multi-step commands. Unable to discern if due to pt being [place] vs reduced auditory comprehension/ initiation, requires further assessment. Pt c/o word finding diffiuclty, pt with minimal verbal output during evaluation, requires ongoing assessment for goal generation. Speech Diagnosis: Moderate dysarthria/ dysphonia, pt with intermittent periods of tachypnea and reduced breath support impacting intelligibility at the short phrase/ sentence level with reduced articulatory precision, pt with hoarse, gravely wet vocal quality    Current Diet Order: ADULT DIET; Dysphagia - Soft and Bite Sized; Mildly Thick (Nectar)            Plan:     Frequency:  5days/week   60 minutes/day  Discharge Recommendations:   Barriers: Pre-existing cognitive deficit; severity of deficits;  Limited initiation  Discharge Recommendations:  [] Home independently  [x] Home with assistance [x]  24 hour supervision  [] SNF [x] Other: TBD  Continued SLP Treatment:  [x] Yes [] No [] TBD based on progress while on ARU [] Vital Stim indicated [] Other:   Estimated discharge date: 7/16/21    Type of Total Treatment Minutes   Session 1   Session 2   Time In 0800 1030   Time Out 0830 1100   Timed Code Minutes  15 20   Individual Treatment Minutes 30 30   Co-Treatment Minutes      Group Treatment Minutes      Concurrent Treatment Minutes        TOTAL DAILY MINUTES:  60    Electronically Signed by     Jaswant Mehta M.A.  CCC-SLP [facility] [id number]  Speech-Language Pathologist [phone]  6/29/2021 8:36 AM

## 2021-06-29 NOTE — PROGRESS NOTES
Oral Ramirez  6/29/2021  [id number]    Chief Complaint: Acute embolic stroke ([address]    Subjective:   No overnight events. No current complaints. Reports his breathing is \"fine\". Monitor yet to be obtained from outside facility. ROS: No CP, SOB, dyspnea    Objective:  Patient Vitals for the past 24 hrs:   BP Temp Temp src Pulse Resp SpO2   06/29/21 0738 (!) 161/84 98.5 °F (36.9 °C) Oral 62 20 94 %   06/28/21 2016 (!) 141/71 98 °F (36.7 °C) Oral 63 20 95 %   06/28/21 1443 120/74 97.4 °F (36.3 °C) Oral 62 17 94 %     Gen: No distress, pleasant. Resting in bedside chair  HEENT: Normocephalic, atraumatic. CV: Regular rate and rhythm. No MRG   Resp: No respiratory distress. CTAB. Abd: Soft, nontender nondistended  Ext: No edema. Neuro: Alert, poor initiation, appropriately interactive. Laboratory data: Available via EMR. Therapy progress:  PT  Position Activity Restriction  Other position/activity restrictions: 81year old male with a history of CAD, dementia, PVD who was admitted to [facility] on 6/19 with left-sided weakness. He was recently evaluated at this facility for an episode of syncope. Work-up revealed a right watershed ischemic stroke in the right frontal and parietal regions. He was initiated on Plavix in addition to aspirin and statin. A1c 5.3. LDL 88. Echo was unremarkable for an embolic source. He was evaluated by therapy and suggested to continue in an inpatient setting prior to returning home. He has no current complaints today. He is currently sitting up with therapy and having intermittent episodes of increased respirations with decreased heart rate.   Objective     Sit to Stand: 2 Person Assistance, Dependent/Total (maxA of 2 in parallel bars)  Stand to sit: 2 Person Assistance, Dependent/Total (maxA of 2 in parallel bars)  Bed to Chair: Dependent/Total, 2 Person Assistance (maxA of 2 in parallel bars)     OT  PT Equipment Recommendations  Equipment Needed: Yes  Other: TBD with progress     Assessment        SLP                Body mass index is 29.08 kg/m². Assessment:  Patient Active Problem List   Diagnosis    Essential hypertension    Hyperlipidemia    Palpitations    Family history of premature CAD    Shoulder pain    Knee pain    Late onset Alzheimer's disease without behavioral disturbance [address]    Acute renal failure (ARF) (HCC)    Benign prostatic hyperplasia (BPH) with post-void dribbling    Near syncope    Dementia due to Alzheimer's disease ([facility]    Acute embolic stroke ([facility] [address]       Plan:   Right frontal and parietal watershed infarcts: ASA, statin. Plavix for 21 days. PT/OT/SLP. Event monitor mentioned in discharge notes but no monitor with patient. We will follow up and ensure placement given his breathing episodes below.     Intermittent tachypnea: HR decreased during episodes. Working to obtain [employer] monitor. Consider seizure activity vs central etiology     CAD: ASA, statin      HTN: atenolol      Dementia: SLP     Urinary retention: flomax, proscar. Recently required cm placement    PNA: suspect aspiration related. Levaquin     Bowels: Per protocol  Bladder: Per protocol   Sleep: Trazodone provided prn. Pain: tylenol, tramadol   DVT PPx: Lovenox   ELOS: 17-21    Team conference was held today on the patient and discussed directly with the patient utilizing their entire treatment team. Please see separate team note for details. Total treatment time for today's care >35 min. Electronically signed by Sawyer Baeza MD on 6/29/2021 at 9:03 AM    * This document was created using dictation software. While all precautions were taken to ensure accuracy, errors may have occurred. Please disregard any typographical errors.

## 2021-06-29 NOTE — CARE COORDINATION
SW met with patient's daughter [facility] regarding patient's discharge plan. Discussed that the patient may need continued therapy after discharge from [facility]. Patient's daughter stated that the patient/family does not want to consider a SNF placement. Daughter stating that she and her family are moving into the patient's home to provide patient the care that he needs. Patient's daughter is a nurse and family is able to provide the patient the support and care that will be needed. SW gave patient's daughter a SNF list to review if patient/family change their decision. SW will continue to follow progress and update patient's discharge plan as needed.     Electronically signed by WING Newman on 6/29/2021 at 5:18 PM

## 2021-06-29 NOTE — PROGRESS NOTES
Physical Therapy  Facility/Department: [facility] ACUTE REHAB UNIT  Daily Treatment Note  NAME: Didi Lacy  : 1935  MRN: [id number]    Date of Service: 2021    Discharge Recommendations:  24 hour supervision or assist, Home with Home health PT, S Level 3   PT Equipment Recommendations  Equipment Needed: Yes  Other: TBD with progress    Assessment   Body structures, Functions, Activity limitations: Decreased functional mobility ; Decreased balance;Decreased coordination;Decreased endurance;Decreased strength;Decreased posture;Decreased ADL status; Decreased safe awareness;Decreased ROM  Assessment: Pt demonstrates improvement in midline positioning in sitting, but continues to require assist of 2 or more people for standing or functional mobility. Treatment Diagnosis: decreased balance, coordination, and functional mobiilty  Prognosis: Fair  PT Education: Goals;PT Role;Plan of Care;Transfer Training;General Safety; Functional Mobility Training  Patient Education: Pt will require reinforcement secondary to cognitive deficits. Barriers to Learning: cognition  REQUIRES PT FOLLOW UP: Yes  Activity Tolerance  Activity Tolerance: Patient limited by endurance; Patient limited by cognitive status  Activity Tolerance: Pt had multiple episodes of increased WOB with stare and decreased command following     Patient Diagnosis(es): CVA. has a past medical history of Dementia ([facility] [address] HTN (hypertension), Hyperlipidemia, and PONV (postoperative nausea and vomiting). has a past surgical history that includes Knee arthroscopy; Colonoscopy; eye surgery (Right, 2014); Cystoscopy (2018); and TURP (2018).     Restrictions  Restrictions/Precautions  Restrictions/Precautions: Fall Risk, Modified Diet  Required Braces or Orthoses?: No  Position Activity Restriction  Other position/activity restrictions: 81year old male with a history of CAD, dementia, PVD who was admitted to [facility] on  with left-sided weakness. He was recently evaluated at this facility for an episode of syncope. Work-up revealed a right watershed ischemic stroke in the right frontal and parietal regions. He was initiated on Plavix in addition to aspirin and statin. A1c 5.3. LDL 88. Echo was unremarkable for an embolic source. He was evaluated by therapy and suggested to continue in an inpatient setting prior to returning home. He has no current complaints today. He is currently sitting up with therapy and having intermittent episodes of increased respirations with decreased heart rate. Subjective   General  Chart Reviewed: Yes  Additional Pertinent Hx: dementia, HTN, DM  Response To Previous Treatment: Patient with no complaints from previous session. Family / Caregiver Present: No  Subjective  Subjective: Pt in recliner on arrival.  States his butt hurts from sitting. Pain Screening  Patient Currently in Pain: No  Vital Signs  Patient Currently in Pain: No       Orientation     Cognition      Objective      Transfers  Sit to Stand: 2 Person Assistance;Dependent/Total (maxA of 3)  Stand to sit: 2 Person Assistance;Dependent/Total (maxA of 2)  Stand Pivot Transfers: 2 Person Assistance (maxA of 2)  Ambulation  Ambulation?: No  Stairs/Curb  Stairs?: No     Balance  Posture: Poor  Sitting - Static: Poor  Sitting - Dynamic: Poor;-  Standing - Static: Poor;-  Standing - Dynamic: Poor;-            Comment: Performed ADL in chair with pt demonstrating improved midline posture. Performed SPT to w/c as documented above. Performed STS in parallel bars with RUE pushing behavior noted X 3 reps. Performed standing in standing frame with tracking activity wtih OT with increasing L lean noted iwth fatigue and intermittent pushing of RUE. Pt stood X 5 minutes X 2 reps. Pt returned to room and remained in w/c with alarm on and all needs in reach.               G-Code     OutComes Score AM-PAC Score             Goals  Short term goals  Time Frame for Short term goals: 1-2 weeks  Short term goal 1: Pt will perform all bed mobility with maxA of 1  Short term goal 2: Pt will perform SPT with maxA of 1  Short term goal 3: Pt will ambulate 8' with LRAD with maxA of 1  Short term goal 4: Pt will negotiate 1 step with LRAD and maxA of 1  Long term goals  Time Frame for Long term goals : 2-3 weeks  Long term goal 1: Pt will perform all bed mobility with CGA  Long term goal 2: Pt will perform all transfers with CGA  Long term goal 3: Pt will ambulate 48' with LRAD CGA  Long term goal 4: Pt will negotiate 12 steps with kapil  Patient Goals   Patient goals : pt unable to assess    Plan    Plan  Times per week: 60 minutes a day 5 days a week  Current Treatment Recommendations: Strengthening, Transfer Training, Endurance Training, Neuromuscular Re-education, Patient/Caregiver Education & Training, ROM, Wheelchair Mobility Training, Manual Therapy - Soft Tissue Mobilization, Equipment Evaluation, Education, & procurement, Balance Training, Gait Training, Functional Mobility Training, Stair training, Safety Education & Training, Positioning, ADL/Self-care Training  Safety Devices  Type of devices: Call light within reach, Chair alarm in place, Left in chair, Telesitter in use, Gait belt, All fall risk precautions in place  Restraints  Initially in place: No     Therapy Time   Individual Concurrent Group Co-treatment   Time In       0830   Time Out       0930   Minutes       60        Timed Code Treatment Minutes:  60    Total Treatment Minutes:  [address], [place] [id number]

## 2021-06-29 NOTE — PLAN OF CARE
Modified barium swallow earlier, now on nectar thickened liquids. Changed senokot to prn - 3 incont bms today (L hand in depends, needs fingernails trimmed). Correct month & year tonight, wrong year the last 2 times I assessed him. Still not using call light purposefully     Problem: Falls - Risk of:  Goal: Will remain free from falls  Description: Will remain free from falls  Outcome: Ongoing  Family/Patient made aware of the tailored interventions falls plan using the TIPS TOOL.   Goal: Absence of physical injury  Description: Absence of physical injury  Outcome: Ongoing     Problem: Skin Integrity:  Goal: Will show no infection signs and symptoms  Description: Will show no infection signs and symptoms  Outcome: Ongoing  Goal: Absence of new skin breakdown  Description: Absence of new skin breakdown  Outcome: Ongoing     Problem: Confusion - Acute:  Goal: Absence of continued neurological deterioration signs and symptoms  Description: Absence of continued neurological deterioration signs and symptoms  Outcome: Ongoing  Goal: Mental status will be restored to baseline  Description: Mental status will be restored to baseline  Outcome: Ongoing     Problem: Discharge Planning:  Goal: Ability to perform activities of daily living will improve  Description: Ability to perform activities of daily living will improve  Outcome: Ongoing  Goal: Participates in care planning  Description: Participates in care planning  Outcome: Ongoing     Problem: Injury - Risk of, Physical Injury:  Goal: Will remain free from falls  Description: Will remain free from falls  Outcome: Ongoing  Goal: Absence of physical injury  Description: Absence of physical injury  Outcome: Ongoing     Problem: Mood - Altered:  Goal: Mood stable  Description: Mood stable  Outcome: Ongoing  Goal: Absence of abusive behavior  Description: Absence of abusive behavior  Outcome: Ongoing  Goal: Verbalizations of feeling emotionally comfortable while being cared

## 2021-06-30 PROCEDURE — 6360000002 HC RX W HCPCS: Performed by: PHYSICAL MEDICINE & REHABILITATION

## 2021-06-30 PROCEDURE — 6370000000 HC RX 637 (ALT 250 FOR IP): Performed by: PHYSICAL MEDICINE & REHABILITATION

## 2021-06-30 PROCEDURE — 92526 ORAL FUNCTION THERAPY: CPT

## 2021-06-30 PROCEDURE — 97530 THERAPEUTIC ACTIVITIES: CPT

## 2021-06-30 PROCEDURE — 1280000000 HC REHAB R&B

## 2021-06-30 PROCEDURE — 97130 THER IVNTJ EA ADDL 15 MIN: CPT

## 2021-06-30 PROCEDURE — 97129 THER IVNTJ 1ST 15 MIN: CPT

## 2021-06-30 PROCEDURE — 97535 SELF CARE MNGMENT TRAINING: CPT

## 2021-06-30 PROCEDURE — 97112 NEUROMUSCULAR REEDUCATION: CPT

## 2021-06-30 RX ADMIN — TAMSULOSIN HYDROCHLORIDE 0.4 MG: 0.4 CAPSULE ORAL at 08:24

## 2021-06-30 RX ADMIN — ATENOLOL 25 MG: 50 TABLET ORAL at 08:24

## 2021-06-30 RX ADMIN — ASPIRIN 81 MG: 81 TABLET, CHEWABLE ORAL at 08:24

## 2021-06-30 RX ADMIN — FINASTERIDE 5 MG: 5 TABLET, FILM COATED ORAL at 08:24

## 2021-06-30 RX ADMIN — LISINOPRIL 5 MG: 5 TABLET ORAL at 08:24

## 2021-06-30 RX ADMIN — CLOPIDOGREL BISULFATE 75 MG: 75 TABLET ORAL at 08:24

## 2021-06-30 RX ADMIN — ENOXAPARIN SODIUM 40 MG: 40 INJECTION SUBCUTANEOUS at 08:24

## 2021-06-30 RX ADMIN — LEVOFLOXACIN 500 MG: 500 TABLET, FILM COATED ORAL at 08:24

## 2021-06-30 RX ADMIN — ATORVASTATIN CALCIUM 40 MG: 40 TABLET, FILM COATED ORAL at 21:31

## 2021-06-30 ASSESSMENT — PAIN SCALES - GENERAL
PAINLEVEL_OUTOF10: 0
PAINLEVEL_OUTOF10: 0

## 2021-06-30 NOTE — PROGRESS NOTES
Daughter in for visit and states she has checked the patients mail and no cardiac monitor has been delivered.

## 2021-06-30 NOTE — PLAN OF CARE
Education Provided as followed:  \"Family/Patient made aware of the tailored interventions falls plan using the TIPS TOOL.    Problem: Falls - Risk of:  Goal: Will remain free from falls  Description: Will remain free from falls  6/30/2021 0938 by Aurelio Morgan RN  Outcome: Ongoing  6/29/2021 2048 by Corby De Anda RN  Outcome: Ongoing  Goal: Absence of physical injury  Description: Absence of physical injury  6/30/2021 0938 by Aurelio Morgan RN  Outcome: Ongoing  6/29/2021 2048 by Corby De Anda RN  Outcome: Ongoing     Problem: Skin Integrity:  Goal: Will show no infection signs and symptoms  Description: Will show no infection signs and symptoms  6/30/2021 0938 by Aurelio Morgan RN  Outcome: Ongoing  6/29/2021 2048 by Corby De Anda RN  Outcome: Ongoing  Goal: Absence of new skin breakdown  Description: Absence of new skin breakdown  6/30/2021 0938 by Aurelio Morgan RN  Outcome: Ongoing  6/29/2021 2048 by Corby De Anda RN  Outcome: Ongoing     Problem: Confusion - Acute:  Goal: Absence of continued neurological deterioration signs and symptoms  Description: Absence of continued neurological deterioration signs and symptoms  6/30/2021 0938 by Aurelio Morgan RN  Outcome: Ongoing  6/29/2021 2048 by Corby De Anda RN  Outcome: Ongoing  Goal: Mental status will be restored to baseline  Description: Mental status will be restored to baseline  6/30/2021 0938 by Aurelio Morgan RN  Outcome: Ongoing  6/29/2021 2048 by Corby De Anda RN  Outcome: Ongoing     Problem: Discharge Planning:  Goal: Ability to perform activities of daily living will improve  Description: Ability to perform activities of daily living will improve  6/30/2021 0938 by Aurelio Morgan RN  Outcome: Ongoing  6/29/2021 2048 by Corby De Anda RN  Outcome: Ongoing  Goal: Participates in care planning  Description: Participates in care planning  6/30/2021 0938 by Aurelio Morgan RN  Outcome: Ongoing  6/29/2021 2048 by Corby De Anda RN  Outcome: Ongoing     Problem: Injury - Risk of, Physical Injury:  Goal: Will remain free from falls  Description: Will remain free from falls  6/30/2021 0938 by Abril Everett RN  Outcome: Ongoing  6/29/2021 2048 by Gabbie Liu RN  Outcome: Ongoing  Goal: Absence of physical injury  Description: Absence of physical injury  6/30/2021 0938 by Abril Everett RN  Outcome: Ongoing  6/29/2021 2048 by Gabbie Liu RN  Outcome: Ongoing     Problem: Mood - Altered:  Goal: Mood stable  Description: Mood stable  6/30/2021 0938 by Abril Everett RN  Outcome: Ongoing  6/29/2021 2048 by Gabbie Liu RN  Outcome: Ongoing  Goal: Absence of abusive behavior  Description: Absence of abusive behavior  6/30/2021 0938 by Abril Everett RN  Outcome: Ongoing  6/29/2021 2048 by Gabbie Liu RN  Outcome: Ongoing  Goal: Verbalizations of feeling emotionally comfortable while being cared for will increase  Description: Verbalizations of feeling emotionally comfortable while being cared for will increase  6/30/2021 0938 by Abril Everett RN  Outcome: Ongoing  6/29/2021 2048 by Gabbie Liu RN  Outcome: Ongoing     Problem: Psychomotor Activity - Altered:  Goal: Absence of psychomotor disturbance signs and symptoms  Description: Absence of psychomotor disturbance signs and symptoms  6/30/2021 0938 by Abril Everett RN  Outcome: Ongoing  6/29/2021 2048 by Gabbie Liu RN  Outcome: Ongoing     Problem: Sensory Perception - Impaired:  Goal: Demonstrations of improved sensory functioning will increase  Description: Demonstrations of improved sensory functioning will increase  6/30/2021 0938 by Abril Everett RN  Outcome: Ongoing  6/29/2021 2048 by Gabbie Liu RN  Outcome: Ongoing  Goal: Decrease in sensory misperception frequency  Description: Decrease in sensory misperception frequency  6/30/2021 0938 by Abril Everett RN  Outcome: Ongoing  6/29/2021 2048 by Gabbie Liu RN  Outcome: Ongoing  Goal: Able to refrain from responding to false sensory perceptions  Description: Able to refrain from responding to false sensory perceptions  6/30/2021 0938 by Amy Mcdermott RN  Outcome: Ongoing  6/29/2021 2048 by Gifty Trevino RN  Outcome: Ongoing  Goal: Demonstrates accurate environmental perceptions  Description: Demonstrates accurate environmental perceptions  6/30/2021 8488 by Amy Mcdermott RN  Outcome: Ongoing  6/29/2021 2048 by Gifty Trevino RN  Outcome: Ongoing  Goal: Able to distinguish between reality-based and nonreality-based thinking  Description: Able to distinguish between reality-based and nonreality-based thinking  6/30/2021 0938 by Amy Mcdermott RN  Outcome: Ongoing  6/29/2021 2048 by Gifty Trevino RN  Outcome: Ongoing  Goal: Able to interrupt nonreality-based thinking  Description: Able to interrupt nonreality-based thinking  6/30/2021 0938 by Amy Mcdermott RN  Outcome: Ongoing  6/29/2021 2048 by Gifty Trevino RN  Outcome: Ongoing     Problem: Sleep Pattern Disturbance:  Goal: Appears well-rested  Description: Appears well-rested  6/30/2021 0938 by Amy Mcdermott RN  Outcome: Ongoing  6/29/2021 2048 by Gifty Trevino RN  Outcome: Ongoing     Problem: IP BLADDER/VOIDING  Goal: LTG - Patient will utilize adaptive techniques/equipment to complete bladder elimination  6/30/2021 0938 by Amy Mcdermott RN  Outcome: Ongoing  6/29/2021 2048 by Gifty Trevino RN  Outcome: Ongoing  Goal: STG - Patient demonstrates no accidents  6/30/2021 0938 by Amy Mcdermott RN  Outcome: Ongoing  6/29/2021 2048 by Gifty Trevino RN  Outcome: Ongoing  Goal: STG - Patient will state signs and symptoms of UTI  6/30/2021 0938 by Amy Mcdermott RN  Outcome: Ongoing  6/29/2021 2048 by Gifty Trevino RN  Outcome: Ongoing  Goal: STG - patient will be able to empty bladder  6/30/2021 0938 by Amy Mcdermott RN  Outcome: Ongoing  6/29/2021 2048 by Gifty Trevino RN  Outcome: Ongoing     Problem: IP BOWEL ELIMINATION  Goal: LTG - patient will have regular and routine bowel evacuation  6/30/2021 0938 by Archana Hernadez RN  Outcome: Ongoing  6/29/2021 2048 by Kalie Soto RN  Outcome: Ongoing     Problem: Neurological  Goal: Maximum potential motor/sensory/cognitive function  6/30/2021 0938 by Archana Hernadez RN  Outcome: Ongoing  6/29/2021 2048 by Kalie Soto RN  Outcome: Ongoing

## 2021-06-30 NOTE — PROGRESS NOTES
George Villarreal  6/30/2021  [id number]    Chief Complaint: Acute embolic stroke ([address]    Subjective:   No overnight events. No current complaints. Reported that his cardiac monitor was sent to his house. Family investigating. BP elevated yesterday but improved this am.     ROS: No CP, SOB, dyspnea    Objective:  Patient Vitals for the past 24 hrs:   BP Temp Temp src Pulse Resp SpO2   06/30/21 0815 128/65 97.9 °F (36.6 °C) Oral 64 18 98 %   06/29/21 2003 (!) 163/75 97.9 °F (36.6 °C) Oral 66 18 94 %     Gen: No distress, pleasant. Resting in bedside chair  HEENT: Normocephalic, atraumatic. CV: Regular rate and rhythm. No MRG   Resp: No respiratory distress. CTAB. Abd: Soft, nontender nondistended  Ext: No edema. Neuro: Alert, poor initiation, appropriately interactive. Left neglect    Laboratory data: Available via EMR. Therapy progress:  PT  Position Activity Restriction  Other position/activity restrictions: 81year old male with a history of CAD, dementia, PVD who was admitted to [facility] on 6/19 with left-sided weakness. He was recently evaluated at this facility for an episode of syncope. Work-up revealed a right watershed ischemic stroke in the right frontal and parietal regions. He was initiated on Plavix in addition to aspirin and statin. A1c 5.3. LDL 88. Echo was unremarkable for an embolic source. He was evaluated by therapy and suggested to continue in an inpatient setting prior to returning home. He has no current complaints today. He is currently sitting up with therapy and having intermittent episodes of increased respirations with decreased heart rate.   Objective     Sit to Stand: 2 Person Assistance, Dependent/Total (maxA of 3)  Stand to sit: 2 Person Assistance, Dependent/Total (maxA of 2)  Bed to Chair: Dependent/Total, 2 Person Assistance (maxA of 2 in parallel bars)     OT  PT Equipment Recommendations  Equipment Needed: Yes  Other: TBD with progress     Assessment SLP                Body mass index is 29.08 kg/m². Assessment:  Patient Active Problem List   Diagnosis    Essential hypertension    Hyperlipidemia    Palpitations    Family history of premature CAD    Shoulder pain    Knee pain    Late onset Alzheimer's disease without behavioral disturbance ([facility] [address]    Acute renal failure (ARF) (HCC)    Benign prostatic hyperplasia (BPH) with post-void dribbling    Near syncope    Dementia due to Alzheimer's disease ([facility] [address]    Acute embolic stroke ([facility] [address]       Plan:   Right frontal and parietal watershed infarcts: ASA, statin. Plavix for 21 days (7/10). PT/OT/SLP. Event monitor mentioned in discharge notes but no monitor with patient. Family to bring in.      Dysphagia: dysphagia diet, SLP    Intermittent tachypnea: HR decreased during episodes. Working to obtain [employer] [facility] monitor. Consider seizure activity vs central etiology     CAD: ASA, statin      HTN: atenolol      Dementia: SLP     Urinary retention: flomax, proscar. PNA: suspect aspiration related. Levaquin     Bowels: Per protocol  Bladder: Per protocol   Sleep: Trazodone provided prn. Pain: tylenol, tramadol   DVT PPx: Lovenox   SANTIAGO: 7/16    Electronically signed by Denae Humphreys MD on 6/30/2021 at 8:31 AM    * This document was created using dictation software. While all precautions were taken to ensure accuracy, errors may have occurred. Please disregard any typographical errors.

## 2021-06-30 NOTE — PROGRESS NOTES
Occupational Therapy  Facility/Department: [facility] ACUTE REHAB UNIT  Daily Treatment Note  NAME: Abdirahman Bonilla  : 1935  MRN: [id number]    Date of Service: 2021    Discharge Recommendations:  Continue to assess pending progress, Patient would benefit from continued therapy after discharge  OT Equipment Recommendations  Equipment Needed: No  Other: Continue to assess pending pt progress    Assessment   Performance deficits / Impairments: Decreased functional mobility ; Decreased endurance;Decreased coordination;Decreased posture;Decreased ADL status; Decreased ROM; Decreased strength;Decreased balance;Decreased vision/visual deficit; Decreased high-level IADLs;Decreased safe awareness;Decreased cognition;Decreased fine motor control  Assessment: 81 y.o. male presents w/ the above deficits which are impacting his occupational performance. Pt would benefit from continued therapy during inpatient stay in order to maximize safety and independence upon discharge. Treatment Diagnosis: Multiple performance deficits s/p CVA  OT Education: OT Role;Plan of Care;ADL Adaptive Strategies;Transfer Training;Orientation  Patient Education: Pt is not an independent learner  Barriers to Learning: Cognition  REQUIRES OT FOLLOW UP: Yes  Activity Tolerance  Activity Tolerance: Patient Tolerated treatment well  Activity Tolerance: Pt continues to demo L lateral lean and pushing behavior which as limited pt's ability to fully participate in therapy. Safety Devices  Safety Devices in place: Yes  Type of devices: All fall risk precautions in place;Call light within reach; Chair alarm in place;Gait belt;Patient at risk for falls; Left in chair;Nurse notified         Patient Diagnosis(es): CVA     has a past medical history of Dementia ([address] HTN (hypertension), Hyperlipidemia, and PONV (postoperative nausea and vomiting). has a past surgical history that includes Knee arthroscopy;  Colonoscopy; eye surgery (Right, 2014); Cystoscopy (04/04/2018); and TURP (04/26/2018). Restrictions  Restrictions/Precautions  Restrictions/Precautions: Fall Risk, Modified Diet  Required Braces or Orthoses?: No  Position Activity Restriction  Other position/activity restrictions: 81year old male with a history of CAD, dementia, PVD who was admitted to [facility] on 6/19 with left-sided weakness. He was recently evaluated at this facility for an episode of syncope. Work-up revealed a right watershed ischemic stroke in the right frontal and parietal regions. He was initiated on Plavix in addition to aspirin and statin. A1c 5.3. LDL 88. Echo was unremarkable for an embolic source. He was evaluated by therapy and suggested to continue in an inpatient setting prior to returning home. He has no current complaints today. He is currently sitting up with therapy and having intermittent episodes of increased respirations with decreased heart rate. Subjective   General  Chart Reviewed: Yes  Patient assessed for rehabilitation services?: Yes  Response to previous treatment: Patient with no complaints from previous session  Family / Caregiver Present: No  Diagnosis: CVA  Subjective  Subjective: Pt seated in recliner  upon entry, agreeable to OT/PT co-treatment  Vital Signs  Patient Currently in Pain: No     Objective    ADL  Grooming: Setup;Minimal assistance (oral care while seated at sink)  UE Dressing: Maximum assistance  LE Dressing: Dependent/Total  Toileting: Setup;Dependent/Total  Additional Comments: Completed UB/LB dressing while seated in recliner. Pt w/ significant L lateral lean requiring second person assist for sitting balance. Pt incontinent of urine in brief. Total assist for toileting. Pt completed grooming tasks while seated at sink.      Balance  Sitting Balance: Maximum assistance  Standing Balance: Dependent/Total  Standing Balance  Time: ~8 min total  Activity: Functional mobility, transfers, ADL completion  Comment: Pt stood for ~6 min in standing frame while completing dynamic reaching tasks to facilitate R lateral lean and trunk extension. Pt demoed good attention to task throughout, was able to visually track targets when provided w/ verbal cues. Transfers  Sit to stand: 2 Person assistance;Dependent/Total  Stand to sit: 2 Person assistance;Dependent/Total  Transfer Comments: Max A x2     Cognition  Overall Cognitive Status: WNL  Arousal/Alertness: Delayed responses to stimuli  Following Commands: Follows one step commands with repetition; Follows one step commands with increased time  Attention Span: Attends with cues to redirect  Memory: Decreased recall of biographical Information;Decreased short term memory;Decreased long term memory;Decreased recall of recent events  Safety Judgement: Decreased awareness of need for safety;Decreased awareness of need for assistance  Problem Solving: Decreased awareness of errors;Assistance required to identify errors made;Assistance required to correct errors made  Insights: Decreased awareness of deficits  Initiation: Requires cues for all  Sequencing: Requires cues for all  Cognition Comment: Increased processing time for verbal commands; pt w/ increased attention to task this date      Additional activity: Pt completed the following exercises EOM in order to address midline orientation: rotational reach in forearm prop position on wedge x5 reps each side. Pt was able to place objects into a basket independently w/ R hand, required Mod A for facilitation to use LUE, total assist to release objects from L hand.      Plan   Plan  Times per week: 60 min; 5-7x  Times per day: Daily  Current Treatment Recommendations: Strengthening, Patient/Caregiver Education & Training, Home Management Training, Equipment Evaluation, Education, & procurement, Balance Training, Neuromuscular Re-education, Functional Mobility Training, Endurance Training, Safety Education & Training, Self-Care / ADL    Goals  Short term goals  Time Frame for Short term goals: 1-2 weeks  Short term goal 1: Functional transfer for ADL completion w/ assist x1  Short term goal 2: Bathing w/ assist x1  Short term goal 3: LB dressing w/ assist x1  Short term goal 4: Toileting w/ assist x1  Short term goal 5: UB dressing w/ Min A  Long term goals  Time Frame for Long term goals : 3-4 weeks  Long term goal 1: Functional transfer for ADL completion w/ supervision  Long term goal 2: Bathing w/ supervision  Long term goal 3: LB dressing w/ supervision  Long term goal 4: Toileting w/ supervision  Long term goal 5: UB dressing w/ supervision  Patient Goals   Patient goals :  To go home       Therapy Time   Individual Concurrent Group Co-treatment   Time In       0830   Time Out       0930   Minutes       60        Timed Code Treatment Minutes:  60 Minutes    Total Treatment Minutes:  60 minutes     Gonzalez Escamilla, OT   Gonzalez Escamilla OTR/L, [facility] #[id number]

## 2021-06-30 NOTE — PLAN OF CARE
Problem: Falls - Risk of:  Goal: Will remain free from falls  Description: Will remain free from falls  Outcome: Ongoing  Goal: Absence of physical injury  Description: Absence of physical injury  Outcome: Ongoing     Problem: Skin Integrity:  Goal: Will show no infection signs and symptoms  Description: Will show no infection signs and symptoms  Outcome: Ongoing  Goal: Absence of new skin breakdown  Description: Absence of new skin breakdown  Outcome: Ongoing     Problem: Confusion - Acute:  Goal: Absence of continued neurological deterioration signs and symptoms  Description: Absence of continued neurological deterioration signs and symptoms  Outcome: Ongoing  Goal: Mental status will be restored to baseline  Description: Mental status will be restored to baseline  Outcome: Ongoing     Problem: Discharge Planning:  Goal: Ability to perform activities of daily living will improve  Description: Ability to perform activities of daily living will improve  Outcome: Ongoing  Goal: Participates in care planning  Description: Participates in care planning  Outcome: Ongoing     Problem: Injury - Risk of, Physical Injury:  Goal: Will remain free from falls  Description: Will remain free from falls  Outcome: Ongoing  Goal: Absence of physical injury  Description: Absence of physical injury  Outcome: Ongoing     Problem: Mood - Altered:  Goal: Mood stable  Description: Mood stable  Outcome: Ongoing  Goal: Absence of abusive behavior  Description: Absence of abusive behavior  Outcome: Ongoing  Goal: Verbalizations of feeling emotionally comfortable while being cared for will increase  Description: Verbalizations of feeling emotionally comfortable while being cared for will increase  Outcome: Ongoing     Problem: Psychomotor Activity - Altered:  Goal: Absence of psychomotor disturbance signs and symptoms  Description: Absence of psychomotor disturbance signs and symptoms  Outcome: Ongoing     Problem: Sensory Perception - Impaired:  Goal: Demonstrations of improved sensory functioning will increase  Description: Demonstrations of improved sensory functioning will increase  Outcome: Ongoing  Goal: Decrease in sensory misperception frequency  Description: Decrease in sensory misperception frequency  Outcome: Ongoing  Goal: Able to refrain from responding to false sensory perceptions  Description: Able to refrain from responding to false sensory perceptions  Outcome: Ongoing  Goal: Demonstrates accurate environmental perceptions  Description: Demonstrates accurate environmental perceptions  Outcome: Ongoing  Goal: Able to distinguish between reality-based and nonreality-based thinking  Description: Able to distinguish between reality-based and nonreality-based thinking  Outcome: Ongoing  Goal: Able to interrupt nonreality-based thinking  Description: Able to interrupt nonreality-based thinking  Outcome: Ongoing     Problem: Sleep Pattern Disturbance:  Goal: Appears well-rested  Description: Appears well-rested  Outcome: Ongoing     Problem: IP BLADDER/VOIDING  Goal: LTG - Patient will utilize adaptive techniques/equipment to complete bladder elimination  Outcome: Ongoing  Goal: STG - Patient demonstrates no accidents  Outcome: Ongoing  Goal: STG - Patient will state signs and symptoms of UTI  Outcome: Ongoing  Goal: STG - patient will be able to empty bladder  Outcome: Ongoing     Problem: IP BOWEL ELIMINATION  Goal: LTG - patient will have regular and routine bowel evacuation  Outcome: Ongoing     Problem: Neurological  Goal: Maximum potential motor/sensory/cognitive function  Outcome: Ongoing

## 2021-06-30 NOTE — PROGRESS NOTES
extended time and mod verbal cues    Trial of thin liquid (water)   - pt tolerated 4/5 presentations   - final presentation resulted in delayed but overt coughing   - provided mod-max cues for implementation /coordination of chin tuck    Pt demonstrated an episode of labored breathing and increased wet vocal quality after trials (approximately 10 minutes post); O2 saturation remained above 95%         Pt will respond and/or initiate action upon verbal prompt within 3 seconds in 4 out of 5 opportunities without repetition. Not directly targeted however pt appeared to improve in response time given single step command/direction vs binary choices. When delays were evident, pt required cues to recall the initial question    Pt responded to question or command within 3 seconds on approximately 50% of presentations. Pt will use visual aids/ strategies to state orientation to time, place, situation with 100% accuracy across 3 consecutive sessions. Independently utilized visual aid for orientation to all temporal concepts     Oriented to place     Disoriented to situation    Independently utilized visual aid for orientation to all temporal concepts     Oriented to place     Disoriented to situation    Pt will improve oral motor function, articulatory precision and breath support in connected speech via graded tasks to >80% acc with use of speech strategies. Goal not targeted this session. Goal not targeted this session. Pt will improve verbal initiation and thought organization for extended utterances (i.e. related to personal hx/ recall of recent events, etc.) >75% acc.    Pt unable to verbalize response to open ended questions (targeted via personally relevant background information - normal blood pressures, and oxygen saturations)   - pt with improved responses when presented via yes/no (I.e. \"is this a normal reading\")   Thought expansion via picture description (personal pictures)   - pt named approximately 90% of family members in photographs (mostly siblings)   - pt required mod cues to provide additional descriptors      Patient will complete fx problem solving tasks and demonstrate insight into limitations and impact on daily functioning with >75% acc, min cues. Pt initially disoriented to situation (stroke)   - he was able to recall on 2/3 attempts   - provided education re correlation of treatment due to side effects of CVA; verbalized understanding however carry over is limited    Pt oriented to situation with f/2 choices. Required max cues to generate list of CVA side effects    Other areas targeted:  Completed oral care to facilitate water trials. Pt required set up and clean up assistance. Mod cues to follow directions upon task completion. Education:   Provided education re rationale for tasks and plan of care. Pt with limited comprehension/ carry over. Requires ongoing education    Provided education re rationale for tasks and plan of care. Pt with limited comprehension/ carry over. Requires ongoing education      Safety Devices: [x] Call light within reach  [x] Chair alarm activated  [] Bed alarm activated  [x] Other: camera in room - PT/OT present  [x] Call light within reach  [x] Chair alarm activated  [] Bed alarm activated  [x] Other: camera in room     Assessment:   Speech Therapy Diagnosis  Cognitive Diagnosis: Pt presents with severe cognitive linguistic deficits due to reduced orienation, reduced processing speed, reduced insight into current situation, reduced immediate/ working/ short term memory with fx problem solving difficulty. Per chart review pt with baseline dementia, did not complete IADLs at home prior to CVA but was able to be safely left alone with daily check ins from family. Aphasia Diagnosis: Significantly reduced processing speed/ difficulty following multi-step commands.  Unable to discern if due to pt being [place] vs reduced auditory comprehension/ initiation, requires further assessment. Pt c/o word finding diffiuclty, pt with minimal verbal output during evaluation, requires ongoing assessment for goal generation. Speech Diagnosis: Moderate dysarthria/ dysphonia, pt with intermittent periods of tachypnea and reduced breath support impacting intelligibility at the short phrase/ sentence level with reduced articulatory precision, pt with hoarse, gravely wet vocal quality    Current Diet Order: ADULT DIET; Dysphagia - Soft and Bite Sized; Mildly Thick (Nectar)            Plan:     Frequency:  5days/week   60 minutes/day  Discharge Recommendations:   Barriers: Pre-existing cognitive deficit; severity of deficits; Limited initiation  Discharge Recommendations:  [] Home independently  [x] Home with assistance [x]  24 hour supervision  [] SNF [x] Other: TBD  Continued SLP Treatment:  [x] Yes [] No [] TBD based on progress while on ARU [] Vital Stim indicated [] Other:   Estimated discharge date: 7/16/21    Type of Total Treatment Minutes   Session 1   Session 2   Time In 0800 1030   Time Out 0830 1100   Timed Code Minutes  15 15   Individual Treatment Minutes  30 30   Co-Treatment Minutes      Group Treatment Minutes      Concurrent Treatment Minutes        TOTAL DAILY MINUTES:  60    Electronically Signed by     Nain Jacome M.A.  CCC-SLP [facility] [id number]  Speech-Language Pathologist [phone]  6/30/2021 10:08 AM

## 2021-06-30 NOTE — PROGRESS NOTES
Physical Therapy  Facility/Department: [facility] ACUTE REHAB UNIT  Daily Treatment Note  NAME: Cj Murphy  : 1935  MRN: [id number]    Date of Service: 2021    Discharge Recommendations:  24 hour supervision or assist, Home with Home health PT, S Level 3   PT Equipment Recommendations  Equipment Needed: Yes  Other: TBD with progress    Assessment   Body structures, Functions, Activity limitations: Decreased functional mobility ; Decreased balance;Decreased coordination;Decreased endurance;Decreased strength;Decreased posture;Decreased ADL status; Decreased safe awareness;Decreased ROM  Assessment: Pt continues to require assistance for all functional mobility and assist of 2 or more skilled therapists to perform STS and SPT. Pt with improved tolerance to standing in standing frame. Treatment Diagnosis: decreased balance, coordination, and functional mobiilty  Prognosis: Fair  PT Education: Goals;PT Role;Plan of Care;Transfer Training;General Safety; Functional Mobility Training  Patient Education: Pt will require reinforcement secondary to cognitive deficits. Barriers to Learning: cognition  REQUIRES PT FOLLOW UP: Yes  Activity Tolerance  Activity Tolerance: Patient limited by endurance; Patient limited by cognitive status  Activity Tolerance: Pt had multiple episodes of increased WOB with stare and decreased command following     Patient Diagnosis(es): CVA. has a past medical history of Dementia ([facility] [address] HTN (hypertension), Hyperlipidemia, and PONV (postoperative nausea and vomiting). has a past surgical history that includes Knee arthroscopy; Colonoscopy; eye surgery (Right, 2014); Cystoscopy (2018); and TURP (2018).     Restrictions  Restrictions/Precautions  Restrictions/Precautions: Fall Risk, Modified Diet  Required Braces or Orthoses?: No  Position Activity Restriction  Other position/activity restrictions: 81year old male with a history of CAD, dementia, PVD who was admitted to [facility] on 6/19 with left-sided weakness. He was recently evaluated at this facility for an episode of syncope. Work-up revealed a right watershed ischemic stroke in the right frontal and parietal regions. He was initiated on Plavix in addition to aspirin and statin. A1c 5.3. LDL 88. Echo was unremarkable for an embolic source. He was evaluated by therapy and suggested to continue in an inpatient setting prior to returning home. He has no current complaints today. He is currently sitting up with therapy and having intermittent episodes of increased respirations with decreased heart rate. Subjective   General  Chart Reviewed: Yes  Additional Pertinent Hx: dementia, HTN, DM  Response To Previous Treatment: Patient with no complaints from previous session. Family / Caregiver Present: No  Subjective  Subjective: Pt in recliner on arrival.  Agreeable to therapy session. Pain Screening  Patient Currently in Pain: No  Vital Signs  Patient Currently in Pain: No       Orientation     Cognition      Objective      Transfers  Sit to Stand: 2 Person Assistance;Dependent/Total (maxA of 2)  Stand to sit: 2 Person Assistance;Dependent/Total (maxA of 2)  Stand Pivot Transfers: 2 Person Assistance;Dependent/Total (maxA of 2)  Ambulation  Ambulation?: No  Stairs/Curb  Stairs?: No     Balance  Posture: Poor  Sitting - Static: Poor  Sitting - Dynamic: Poor;-  Standing - Static: Poor;-  Standing - Dynamic: Poor;-  Comments: Pt with consistent L lean vs push while sitting in w/c and EOM as well as standing and required maxA to obtain midline position and totalA to maintain midline position. Comment: Performed multiple STS transitions from recliner and w/c for pericare and LB dressing. Perfomred sitting balance as documented above in recliner. Performed lateral forearm prop B with reaching with maxA for balance and facilitation for reaching with LUE.   Performed standing frame with activity for OT with cross body reaching with R weight shift with maxA to maintain midline due to L lean. L knee extension activation noted wtih R reaching. Pt returned to room and remained in w/c with alarm on and all needs in reach.               G-Code     OutComes Score                                                     AM-PAC Score             Goals  Short term goals  Time Frame for Short term goals: 1-2 weeks  Short term goal 1: Pt will perform all bed mobility with maxA of 1  Short term goal 2: Pt will perform SPT with maxA of 1  Short term goal 3: Pt will ambulate 8' with LRAD with maxA of 1  Short term goal 4: Pt will negotiate 1 step with LRAD and maxA of 1  Long term goals  Time Frame for Long term goals : 2-3 weeks  Long term goal 1: Pt will perform all bed mobility with CGA  Long term goal 2: Pt will perform all transfers with CGA  Long term goal 3: Pt will ambulate 48' with LRAD CGA  Long term goal 4: Pt will negotiate 12 steps with kapil  Patient Goals   Patient goals : pt unable to assess    Plan    Plan  Times per week: 60 minutes a day 5 days a week  Current Treatment Recommendations: Strengthening, Transfer Training, Endurance Training, Neuromuscular Re-education, Patient/Caregiver Education & Training, ROM, Wheelchair Mobility Training, Manual Therapy - Soft Tissue Mobilization, Equipment Evaluation, Education, & procurement, Balance Training, Gait Training, Functional Mobility Training, Stair training, Safety Education & Training, Positioning, ADL/Self-care Training  Safety Devices  Type of devices: Call light within reach, Chair alarm in place, Left in chair, Telesitter in use, Gait belt, All fall risk precautions in place  Restraints  Initially in place: No     Therapy Time   Individual Concurrent Group Co-treatment   Time In       0830   Time Out       0930   Minutes       60        Timed Code Treatment Minutes:  60    Total Treatment Minutes:  [address], [place] [id number]

## 2021-07-01 LAB
ANION GAP SERPL CALCULATED.3IONS-SCNC: 6 MMOL/L (ref 3–16)
BUN BLDV-MCNC: 20 MG/DL (ref 7–20)
CALCIUM SERPL-MCNC: 9.1 MG/DL (ref 8.3–10.6)
CHLORIDE BLD-SCNC: 105 MMOL/L (ref 99–110)
CO2: 27 MMOL/L (ref 21–32)
CREAT SERPL-MCNC: 1 MG/DL (ref 0.8–1.3)
GFR AFRICAN AMERICAN: >60
GFR NON-AFRICAN AMERICAN: >60
GLUCOSE BLD-MCNC: 104 MG/DL (ref 70–99)
HCT VFR BLD CALC: 39.2 % (ref 40.5–52.5)
HEMOGLOBIN: 13.1 G/DL (ref 13.5–17.5)
MCH RBC QN AUTO: 29.5 PG (ref 26–34)
MCHC RBC AUTO-ENTMCNC: 33.5 G/DL (ref 31–36)
MCV RBC AUTO: 88 FL (ref 80–100)
PDW BLD-RTO: 14 % (ref 12.4–15.4)
PLATELET # BLD: 397 K/UL (ref 135–450)
PMV BLD AUTO: 8.3 FL (ref 5–10.5)
POTASSIUM SERPL-SCNC: 3.9 MMOL/L (ref 3.5–5.1)
RBC # BLD: 4.45 M/UL (ref 4.2–5.9)
SODIUM BLD-SCNC: 138 MMOL/L (ref 136–145)
WBC # BLD: 9.2 K/UL (ref 4–11)

## 2021-07-01 PROCEDURE — 85027 COMPLETE CBC AUTOMATED: CPT

## 2021-07-01 PROCEDURE — 6370000000 HC RX 637 (ALT 250 FOR IP): Performed by: PHYSICAL MEDICINE & REHABILITATION

## 2021-07-01 PROCEDURE — 97129 THER IVNTJ 1ST 15 MIN: CPT

## 2021-07-01 PROCEDURE — 97530 THERAPEUTIC ACTIVITIES: CPT

## 2021-07-01 PROCEDURE — 97130 THER IVNTJ EA ADDL 15 MIN: CPT

## 2021-07-01 PROCEDURE — 6360000002 HC RX W HCPCS: Performed by: PHYSICAL MEDICINE & REHABILITATION

## 2021-07-01 PROCEDURE — 80048 BASIC METABOLIC PNL TOTAL CA: CPT

## 2021-07-01 PROCEDURE — 92526 ORAL FUNCTION THERAPY: CPT

## 2021-07-01 PROCEDURE — 1280000000 HC REHAB R&B

## 2021-07-01 PROCEDURE — 97535 SELF CARE MNGMENT TRAINING: CPT

## 2021-07-01 RX ADMIN — LEVOFLOXACIN 500 MG: 500 TABLET, FILM COATED ORAL at 11:13

## 2021-07-01 RX ADMIN — ASPIRIN 81 MG: 81 TABLET, CHEWABLE ORAL at 11:13

## 2021-07-01 RX ADMIN — CLOPIDOGREL BISULFATE 75 MG: 75 TABLET ORAL at 11:13

## 2021-07-01 RX ADMIN — ENOXAPARIN SODIUM 40 MG: 40 INJECTION SUBCUTANEOUS at 11:13

## 2021-07-01 RX ADMIN — TAMSULOSIN HYDROCHLORIDE 0.4 MG: 0.4 CAPSULE ORAL at 11:13

## 2021-07-01 RX ADMIN — FINASTERIDE 5 MG: 5 TABLET, FILM COATED ORAL at 11:13

## 2021-07-01 RX ADMIN — ATORVASTATIN CALCIUM 40 MG: 40 TABLET, FILM COATED ORAL at 20:05

## 2021-07-01 RX ADMIN — ATENOLOL 25 MG: 50 TABLET ORAL at 11:13

## 2021-07-01 RX ADMIN — LISINOPRIL 5 MG: 5 TABLET ORAL at 11:13

## 2021-07-01 ASSESSMENT — PAIN SCALES - GENERAL
PAINLEVEL_OUTOF10: 0

## 2021-07-01 NOTE — PROGRESS NOTES
Occupational Therapy  Facility/Department: [facility] ACUTE REHAB UNIT  Daily Treatment Note  NAME: Facundo Sanders  : 1935  MRN: [id number]    Date of Service: 2021    Discharge Recommendations:  Continue to assess pending progress, Patient would benefit from continued therapy after discharge  OT Equipment Recommendations  Equipment Needed: No  Other: Continue to assess pending pt progress    Assessment   Performance deficits / Impairments: Decreased functional mobility ; Decreased endurance;Decreased coordination;Decreased posture;Decreased ADL status; Decreased ROM; Decreased strength;Decreased balance;Decreased vision/visual deficit; Decreased high-level IADLs;Decreased safe awareness;Decreased cognition;Decreased fine motor control  Assessment: 81 y.o. male presents w/ the above deficits which are impacting his occupational performance. Pt would benefit from continued therapy during inpatient stay in order to maximize safety and independence upon discharge. Treatment Diagnosis: Multiple performance deficits s/p CVA  OT Education: OT Role;Plan of Care;ADL Adaptive Strategies;Transfer Training;Orientation  Patient Education: Pt is not an independent learner  Barriers to Learning: Cognition  REQUIRES OT FOLLOW UP: Yes  Activity Tolerance  Activity Tolerance: Patient Tolerated treatment well  Activity Tolerance: Pt continues to demo L lateral lean and pushing behavior which as limited pt's ability to fully participate in therapy. Safety Devices  Safety Devices in place: Yes  Type of devices: All fall risk precautions in place;Call light within reach; Chair alarm in place;Gait belt;Patient at risk for falls; Left in chair;Nurse notified         Patient Diagnosis(es): There were no encounter diagnoses. has a past medical history of Dementia ([facility] [address] HTN (hypertension), Hyperlipidemia, and PONV (postoperative nausea and vomiting). has a past surgical history that includes Knee arthroscopy;  Colonoscopy; eye surgery (Right, 11/2014); Cystoscopy (04/04/2018); and TURP (04/26/2018). Restrictions  Restrictions/Precautions  Restrictions/Precautions: Fall Risk, Modified Diet  Required Braces or Orthoses?: No  Position Activity Restriction  Other position/activity restrictions: 81year old male with a history of CAD, dementia, PVD who was admitted to [facility] on 6/19 with left-sided weakness. He was recently evaluated at this facility for an episode of syncope. Work-up revealed a right watershed ischemic stroke in the right frontal and parietal regions. He was initiated on Plavix in addition to aspirin and statin. A1c 5.3. LDL 88. Echo was unremarkable for an embolic source. He was evaluated by therapy and suggested to continue in an inpatient setting prior to returning home. He has no current complaints today. He is currently sitting up with therapy and having intermittent episodes of increased respirations with decreased heart rate. Subjective   General  Chart Reviewed: Yes  Patient assessed for rehabilitation services?: Yes  Response to previous treatment: Patient with no complaints from previous session  Family / Caregiver Present: No  Diagnosis: CVA  Subjective  Subjective: Pt seated in recliner  upon entry, agreeable to OT/PT co-treatment  Vital Signs  Patient Currently in Pain: No   Orientation     Objective    ADL  Grooming: Setup;Minimal assistance (to wash face)  UE Bathing: Setup; Moderate assistance  LE Bathing: Setup;Maximum assistance (Pt able to wash upper thighs w/ Cues)  UE Dressing: Maximum assistance  LE Dressing: Dependent/Total  Toileting: Setup;Dependent/Total  Additional Comments: Completed UB/LB bathing and dressing while seated in recliner. Pt w/ significant L lateral lean requiring second person assist for sitting balance. Pt incontinent of urine in brief. Total assist for toileting.       Balance  Sitting Balance: Maximum assistance  Standing Balance: Dependent/Total  Standing Balance  Time: ~8 min total  Activity: Functional mobility, transfers, ADL completion     Transfers  Sit to stand: 2 Person assistance;Dependent/Total  Stand to sit: 2 Person assistance;Dependent/Total      Cognition  Overall Cognitive Status: WNL  Arousal/Alertness: Delayed responses to stimuli  Following Commands: Follows one step commands with repetition; Follows one step commands with increased time  Attention Span: Attends with cues to redirect  Memory: Decreased recall of biographical Information;Decreased short term memory;Decreased long term memory;Decreased recall of recent events  Safety Judgement: Decreased awareness of need for safety;Decreased awareness of need for assistance  Problem Solving: Decreased awareness of errors;Assistance required to identify errors made;Assistance required to correct errors made  Insights: Decreased awareness of deficits  Initiation: Requires cues for all  Sequencing: Requires cues for all      Additional activity: Pt completed 1 static stance in light gait for ~5 min in order to address functional activity tolerance. Pt required Max A x2 to correct L lateral lean in stance. Pt completed dynamic reaching task to facilitate thoracic extension and R lateral lean x10 reps.         Plan   Plan  Times per week: 60 min; 5-7x  Times per day: Daily  Current Treatment Recommendations: Strengthening, Patient/Caregiver Education & Training, Home Management Training, Equipment Evaluation, Education, & procurement, Balance Training, Neuromuscular Re-education, Functional Mobility Training, Endurance Training, Safety Education & Training, Self-Care / ADL       Goals  Short term goals  Time Frame for Short term goals: 1-2 weeks  Short term goal 1: Functional transfer for ADL completion w/ assist x1  Short term goal 2: Bathing w/ assist x1  Short term goal 3: LB dressing w/ assist x1  Short term goal 4: Toileting w/ assist x1  Short term goal 5: UB dressing w/ Min A  Long term goals  Time Frame for Long term goals : 3-4 weeks  Long term goal 1: Functional transfer for ADL completion w/ supervision  Long term goal 2: Bathing w/ supervision  Long term goal 3: LB dressing w/ supervision  Long term goal 4: Toileting w/ supervision  Long term goal 5: UB dressing w/ supervision  Patient Goals   Patient goals :  To go home       Therapy Time   Individual Concurrent Group Co-treatment   Time In       0830   Time Out       0930   Minutes       60        Timed Code Treatment Minutes:  60 Minutes    Total Treatment Minutes:  60 minutes       Rika Escalera, OT   Rika Escalera OTR/L, [place] #[id number]

## 2021-07-01 NOTE — PROGRESS NOTES
ACUTE REHAB UNIT  SPEECH/LANGUAGE PATHOLOGY      [x] Daily  [] Weekly Care Conference Note  [] Discharge    Patient:Reno Troncoso     :1935  LPK:[id number]  Room #: EOQ-2816/2237-90   Rehab Dx/Hx: Acute embolic stroke [facility]) [O89.5]  Date of Admit: 2021      Precautions: falls and aspirations  Home situation: Per pt's daughter Isidoro Harris) pt lives with his grandson (22years old). Dara Kirby reports plan is for her and her family to move into his place to provide more assistance. ST Dx: Mild-moderate oropharyngeal dysphagia; Severe cognitive linguistic deficits; Moderate dysarthria / dysphonia    Daily Treatment Info:   Date: 2021     Tx session 1 Tx session 2   Pain Denied  Denied    Subjective     Pt seen sitting upright in chair, alert and cooperative. Pt participated in session but needed repetitions of questions and verbal cues for initiation at times. Pt seen sitting upright in wheelchair. Required repositioning of L arm. Alert and cooperative but again requires repetitions of questions and cues for initiation. Initiation is overall improved compared to when last seen by this therapist.    Objective:  Goals     Pt will tolerate recommended diet and advanced trials with use of compensatory swallow strategies provided with < mod cues with no overt clinical s/s of aspiration or noted difficulty.      Pt was seen during breakfast meal consisting of scrambled eggs, chopped sausage links, and mildly (nectar) thick liquids.  -adequate mastication within appropriate time frame  -pt tends to take multiple bites in a row without clearing prior bite from oral cavity  -taking more frequent breaks every few bites this date  -mild residue noted on lingual surface, clears with liquid rinse as needed  -no overt s/s aspiration noted  -pt needed verbal cues for slow rate, small bites, and to clear oral residue between bites (with liquid rinse as needed)    Facilitated trials of thin water  -provided set-up/clean-up assistance for completion of oral care prior to trials  -pt tolerated 8/8   -mildly delayed swallow initiation, suspect premature spillage to pharynx and reduced laryngeal elevation  -work of breathing was stable throughout presentations    Trials of thin liquid water:  -assisted pt with set-up/clean-up to complete oral care prior to trials  -pt tolerated 5/5 cup sips of thin liquid water without overt s/s aspiration  -discussed [employer] Protocol parameters and recommend to initiate at this time. Signs placed in pt's room for \"staff assisted\" [employer] Protocol. Discussed with RN. Addendum: In afternoon, pt's sister came to visit pt and brought in [facility] \"Frosty. \" PCA inquired if pt is able to have this due to currently on mildly thick liquids. SLP went to pt's room to discuss current diet with pt's sister. Discussed rationale for current diet and results of MBS, rationale for not having ice cream/milkshakes at this time. Also discussed implementation of [employer]. Sister v/u and in agreement. Pt will respond and/or initiate action upon verbal prompt within 3 seconds in 4 out of 5 opportunities without repetition. -pt needed verbal cues for initiation of self-feeding  -when asked if he needed assistance with set-up of meal, pt indicated \"no. \" After attempting to open yogurt without success, pt did initiate asking SLP for help to open (with expectant waiting)  -overall, pt seems to demonstrate improved response times, but he does still require repetitions of questions at times in order to provide a response and min verbal cues to initiate actions (self-feeding, etc.)  -pt with more appropriate response times with yes/no questions    Overall, pt was responding to questions within ~every 2-5 seconds during session with occasional need for repetition of questions.    Pt will use visual aids/ strategies to state orientation to time, place, situation with 100% accuracy from the nurse? \" pt was able to ID/verbalize correct button on call light. Goal not targeted this session. Other areas targeted: Fx recall:  -stated he had 3 children (not 4)  -named 3/4 children  -unable to state # grandchildren  -unable to recall living situation  -named where 1/4 children live     Education:   Provided education re rationale for tasks and plan of care. Pt with limited comprehension/carry over. Requires ongoing education. Provided education re rationale for tasks, [employer], and plan of care. Pt with limited comprehension/ carry over. Requires ongoing education. Safety Devices: [x] Call light within reach  [x] Chair alarm activated  [] Bed alarm activated  [x] Other: camera in room [x] Call light within reach  [x] Chair alarm activated  [] Bed alarm activated  [x] Other: camera in room     Assessment:   Speech Therapy Diagnosis  Cognitive Diagnosis: Pt presents with severe cognitive linguistic deficits due to reduced orienation, reduced processing speed, reduced insight into current situation, reduced immediate/ working/ short term memory with fx problem solving difficulty. Per chart review pt with baseline dementia, did not complete IADLs at home prior to CVA but was able to be safely left alone with daily check ins from family. Aphasia Diagnosis: Significantly reduced processing speed/ difficulty following multi-step commands. Unable to discern if due to pt being [place] vs reduced auditory comprehension/ initiation, requires further assessment. Pt c/o word finding diffiuclty, pt with minimal verbal output during evaluation, requires ongoing assessment for goal generation. Speech Diagnosis: Moderate dysarthria/ dysphonia, pt with intermittent periods of tachypnea and reduced breath support impacting intelligibility at the short phrase/ sentence level with reduced articulatory precision, pt with hoarse, gravely wet vocal quality    Current Diet Order: ADULT DIET;  Dysphagia - Soft and Bite Sized; Mildly Thick (Nectar)            Plan:     Frequency:  5days/week   60 minutes/day  Discharge Recommendations:   Barriers: Pre-existing cognitive deficit; severity of deficits;  Limited initiation  Discharge Recommendations:  [] Home independently  [x] Home with assistance [x]  24 hour supervision  [] SNF [x] Other: TBD  Continued SLP Treatment:  [x] Yes [] No [] TBD based on progress while on ARU [] Vital Stim indicated [] Other:   Estimated discharge date: 7/16/21    Type of Total Treatment Minutes   Session 1   Session 2   Time In 0800 1000   Time Out 0830 1030   Timed Code Minutes  15 20   Individual Treatment Minutes  30 30   Co-Treatment Minutes      Group Treatment Minutes      Concurrent Treatment Minutes        TOTAL DAILY MINUTES:  60    Electronically Signed by     HONG Martel Pathologist  7/1/2021 12:58 PM

## 2021-07-01 NOTE — PLAN OF CARE
Education Provided as followed:  \"Family/Patient made aware of the tailored interventions falls plan using the TIPS TOOL. Problem: Falls - Risk of:  Goal: Will remain free from falls  Description: Will remain free from falls  7/1/2021 1145 by Kyree Canales RN  Outcome: Ongoing  7/1/2021 0405 by Dora Simons RN  Outcome: Ongoing  Note: Education Provided as followed:  Family/Patient made aware of the tailored interventions falls plan using the TIPS TOOL.   Goal: Absence of physical injury  Description: Absence of physical injury  7/1/2021 1145 by Kyree Canales RN  Outcome: Ongoing  7/1/2021 0405 by Dora Simons RN  Outcome: Ongoing     Problem: Skin Integrity:  Goal: Will show no infection signs and symptoms  Description: Will show no infection signs and symptoms  7/1/2021 1145 by Kyree Canales RN  Outcome: Ongoing  7/1/2021 0405 by Dora Simons RN  Outcome: Ongoing  Goal: Absence of new skin breakdown  Description: Absence of new skin breakdown  7/1/2021 1145 by Kyree Canales RN  Outcome: Ongoing  7/1/2021 0405 by Dora Simons RN  Outcome: Ongoing     Problem: Confusion - Acute:  Goal: Absence of continued neurological deterioration signs and symptoms  Description: Absence of continued neurological deterioration signs and symptoms  7/1/2021 1145 by Kyree Canales RN  Outcome: Ongoing  7/1/2021 0405 by Dora Simons RN  Outcome: Ongoing  Goal: Mental status will be restored to baseline  Description: Mental status will be restored to baseline  7/1/2021 1145 by Kyree Canales RN  Outcome: Ongoing  7/1/2021 0405 by Dora Simons RN  Outcome: Ongoing     Problem: Discharge Planning:  Goal: Ability to perform activities of daily living will improve  Description: Ability to perform activities of daily living will improve  7/1/2021 1145 by Kyree Canales RN  Outcome: Ongoing  7/1/2021 0405 by Dora Simons RN  Outcome: Ongoing  Goal: Participates in care Zetta Olszewski, RN  Outcome: Ongoing  Goal: Decrease in sensory misperception frequency  Description: Decrease in sensory misperception frequency  7/1/2021 1145 by Yi Leger RN  Outcome: Ongoing  7/1/2021 0405 by Zetta Olszewski, RN  Outcome: Ongoing  Goal: Able to refrain from responding to false sensory perceptions  Description: Able to refrain from responding to false sensory perceptions  7/1/2021 1145 by Yi Leger RN  Outcome: Ongoing  7/1/2021 0405 by Zetta Olszewski, RN  Outcome: Ongoing  Goal: Demonstrates accurate environmental perceptions  Description: Demonstrates accurate environmental perceptions  7/1/2021 1145 by Yi Leger RN  Outcome: Ongoing  7/1/2021 0405 by Zetta Olszewski, RN  Outcome: Ongoing  Goal: Able to distinguish between reality-based and nonreality-based thinking  Description: Able to distinguish between reality-based and nonreality-based thinking  7/1/2021 1145 by Yi Leger RN  Outcome: Ongoing  7/1/2021 0405 by Zetta Olszewski, RN  Outcome: Ongoing  Goal: Able to interrupt nonreality-based thinking  Description: Able to interrupt nonreality-based thinking  7/1/2021 1145 by Yi Leger RN  Outcome: Ongoing  7/1/2021 0405 by Zetta Olszewski, RN  Outcome: Ongoing     Problem: Sleep Pattern Disturbance:  Goal: Appears well-rested  Description: Appears well-rested  7/1/2021 1145 by Yi Leger RN  Outcome: Ongoing  7/1/2021 0405 by Zetta Olszewski, RN  Outcome: Ongoing     Problem: IP BLADDER/VOIDING  Goal: LTG - Patient will utilize adaptive techniques/equipment to complete bladder elimination  7/1/2021 1145 by Yi Leger RN  Outcome: Ongoing  7/1/2021 0405 by Zetta Olszewski, RN  Outcome: Ongoing  Goal: STG - Patient demonstrates no accidents  7/1/2021 1145 by Yi Leger RN  Outcome: Ongoing  7/1/2021 0405 by Zetta Olszewski, RN  Outcome: Ongoing  Goal: STG - Patient will state signs and symptoms of UTI  7/1/2021 1145 by Gali Mejia Chris Justin RN  Outcome: Ongoing  7/1/2021 0405 by Angela Benoit RN  Outcome: Ongoing  Goal: STG - patient will be able to empty bladder  7/1/2021 1145 by Benigno Sanderson RN  Outcome: Ongoing  7/1/2021 0405 by Angela Benoit RN  Outcome: Ongoing     Problem: IP BOWEL ELIMINATION  Goal: LTG - patient will have regular and routine bowel evacuation  7/1/2021 1145 by Benigno Sanderson RN  Outcome: Ongoing  7/1/2021 0405 by Angela Benoit RN  Outcome: Ongoing     Problem: Neurological  Goal: Maximum potential motor/sensory/cognitive function  7/1/2021 1145 by Benigno Sanderson RN  Outcome: Ongoing  7/1/2021 0405 by Angela Benoit RN  Outcome: Ongoing

## 2021-07-01 NOTE — PROGRESS NOTES
Shanon Shepard  7/1/2021  [id number]    Chief Complaint: Acute embolic stroke ([address]    Subjective:   No overnight events. No current complaints. No cardiac monitor yet. BP improved yesterday. ROS: No CP, SOB, dyspnea    Objective:  Patient Vitals for the past 24 hrs:   BP Temp Temp src Pulse Resp   06/30/21 2142 (!) 149/82       06/30/21 2139 (!) 163/88       06/30/21 2130 (!) 170/88 97.5 °F (36.4 °C) Axillary 63 19     Gen: No distress, pleasant. Resting in bedside chair  HEENT: Normocephalic, atraumatic. CV: Regular rate and rhythm. No MRG   Resp: No respiratory distress. CTAB. Abd: Soft, nontender nondistended  Ext: No edema. Neuro: Alert, poor initiation, appropriately interactive. Left neglect    Laboratory data: Available via EMR. Therapy progress:  PT  Position Activity Restriction  Other position/activity restrictions: 81year old male with a history of CAD, dementia, PVD who was admitted to [facility] on 6/19 with left-sided weakness. He was recently evaluated at this facility for an episode of syncope. Work-up revealed a right watershed ischemic stroke in the right frontal and parietal regions. He was initiated on Plavix in addition to aspirin and statin. A1c 5.3. LDL 88. Echo was unremarkable for an embolic source. He was evaluated by therapy and suggested to continue in an inpatient setting prior to returning home. He has no current complaints today. He is currently sitting up with therapy and having intermittent episodes of increased respirations with decreased heart rate.   Objective     Sit to Stand: 2 Person Assistance, Dependent/Total (maxA of 2)  Stand to sit: 2 Person Assistance, Dependent/Total (maxA of 2)  Bed to Chair: Dependent/Total, 2 Person Assistance (maxA of 2 in parallel bars)     OT  PT Equipment Recommendations  Equipment Needed: Yes  Other: TBD with progress     Assessment        SLP                Body mass index is 29.08

## 2021-07-01 NOTE — PROGRESS NOTES
Nutrition Assessment     Type and Reason for Visit: Reassess    Nutrition Recommendations/Plan:   Diet consistency per SLP     Nutrition Assessment:  Nutrition status is stable AEB po intake at least 50% of meals on Dysphagia soft & bite sized diet. RN reports pt needs reminders to eat d/t alzheimers, but has a good appetite. No new wt to review for chnages. WIll con't to monitor for further nutrition needs as identified. Malnutrition Assessment:  Malnutrition Status: No malnutrition    Nutrition Related Findings: LBM 6/30; +3L; no edema noted      Current Nutrition Therapies:    ADULT DIET; Dysphagia - Soft and Bite Sized; Mildly Thick (Nectar)    Anthropometric Measures:  · Height: 6' 2\" (188 cm)  · Current Body Wt: 226 lb (102.5 kg)   · BMI: 29    Nutrition Diagnosis:   No nutrition diagnosis at this time     Nutrition Interventions:   Food and/or Nutrient Delivery:  Continue Current Diet  Nutrition Education/Counseling:  Education not indicated   Coordination of Nutrition Care:  Continue to monitor while inpatient    Goals:  po intake greater than 50% of meals       Nutrition Monitoring and Evaluation:   Behavioral-Environmental Outcomes:  None Identified   Food/Nutrient Intake Outcomes:  Food and Nutrient Intake  Physical Signs/Symptoms Outcomes:  None Identified     Discharge Planning:     Too soon to determine     Electronically signed by Diana Jacome RD, LD on 7/1/21 at 10:43 AM EDT    Contact: [phone]

## 2021-07-01 NOTE — PROGRESS NOTES
Physical Therapy  Facility/Department: [facility] ACUTE REHAB UNIT  Daily Treatment Note  NAME: Jayson Glover  : 1935  MRN: [id number]    Date of Service: 2021    Discharge Recommendations:  24 hour supervision or assist, Home with Home health PT, S Level 3   PT Equipment Recommendations  Equipment Needed: Yes  Other: TBD with progress    Assessment   Body structures, Functions, Activity limitations: Decreased functional mobility ; Decreased balance;Decreased coordination;Decreased endurance;Decreased strength;Decreased posture;Decreased ADL status; Decreased safe awareness;Decreased ROM  Assessment: Pt continues to require assistance for all functional mobility and assist of 2 or more skilled therapists to perform STS and SPT. Pt required increased assistance for standing in lite gait as well with decreased initiation of activation of standing. Treatment Diagnosis: decreased balance, coordination, and functional mobiilty  Prognosis: Fair  PT Education: Goals;PT Role;Plan of Care;Transfer Training;General Safety; Functional Mobility Training  Patient Education: Pt will require reinforcement secondary to cognitive deficits. Barriers to Learning: cognition  REQUIRES PT FOLLOW UP: Yes  Activity Tolerance  Activity Tolerance: Patient limited by endurance; Patient limited by cognitive status  Activity Tolerance: Pt had multiple episodes of increased WOB with stare and decreased command following     Patient Diagnosis(es): CVA. has a past medical history of Dementia ([facility] [address] HTN (hypertension), Hyperlipidemia, and PONV (postoperative nausea and vomiting). has a past surgical history that includes Knee arthroscopy; Colonoscopy; eye surgery (Right, 2014); Cystoscopy (2018); and TURP (2018).     Restrictions  Restrictions/Precautions  Restrictions/Precautions: Fall Risk, Modified Diet  Required Braces or Orthoses?: No  Position Activity Restriction  Other position/activity restrictions: 81year old male with a history of CAD, dementia, PVD who was admitted to [facility] on 6/19 with left-sided weakness. He was recently evaluated at this facility for an episode of syncope. Work-up revealed a right watershed ischemic stroke in the right frontal and parietal regions. He was initiated on Plavix in addition to aspirin and statin. A1c 5.3. LDL 88. Echo was unremarkable for an embolic source. He was evaluated by therapy and suggested to continue in an inpatient setting prior to returning home. He has no current complaints today. He is currently sitting up with therapy and having intermittent episodes of increased respirations with decreased heart rate. Subjective   General  Chart Reviewed: Yes  Additional Pertinent Hx: dementia, HTN, DM  Response To Previous Treatment: Patient with no complaints from previous session. Family / Caregiver Present: No  Subjective  Subjective: Pt in recliner on arrival.  Agreeable to therapy session. Pain Screening  Patient Currently in Pain: No  Vital Signs  Patient Currently in Pain: No       Orientation     Cognition      Objective      Transfers  Sit to Stand: 2 Person Assistance;Dependent/Total (maxA of 2)  Stand to sit: 2 Person Assistance;Dependent/Total (maxA of 2)  Stand Pivot Transfers: Dependent/Total;2 Person Assistance (maxA of 2)  Ambulation  Ambulation?: No  Stairs/Curb  Stairs?: No     Balance  Posture: Poor  Sitting - Static: Poor  Sitting - Dynamic: Poor;-  Standing - Static: Poor;-  Standing - Dynamic: Poor;-            Comment: Performed ADL and dressing in chair with OT with mod-maxA for midline balance. Performed SPT from recliner to w/c with maxA of 2. Performed standing in parallel bars with maxA of 2 with L push noted when RUE on bar and decreased use of LUE, with B knee block and assist for R weight shift iwth decreased Wb through RLE WB in standing and sitting.   Performed standing in lite gait with maxA of 2 with assist at BLE to increase WB and assist at trunk for midline posture. Performed R reaching with activation of L quad noted. Pt returned to room and remained in w/c with alarm on and all needs in reach.               G-Code     OutComes Score                                                     AM-PAC Score             Goals  Short term goals  Time Frame for Short term goals: 1-2 weeks  Short term goal 1: Pt will perform all bed mobility with maxA of 1  Short term goal 2: Pt will perform SPT with maxA of 1  Short term goal 3: Pt will ambulate 8' with LRAD with maxA of 1  Short term goal 4: Pt will negotiate 1 step with LRAD and maxA of 1  Long term goals  Time Frame for Long term goals : 2-3 weeks  Long term goal 1: Pt will perform all bed mobility with CGA  Long term goal 2: Pt will perform all transfers with CGA  Long term goal 3: Pt will ambulate 48' with LRAD CGA  Long term goal 4: Pt will negotiate 12 steps with kapil  Patient Goals   Patient goals : pt unable to assess    Plan    Plan  Times per week: 60 minutes a day 5 days a week  Current Treatment Recommendations: Strengthening, Transfer Training, Endurance Training, Neuromuscular Re-education, Patient/Caregiver Education & Training, ROM, Wheelchair Mobility Training, Manual Therapy - Soft Tissue Mobilization, Equipment Evaluation, Education, & procurement, Balance Training, Gait Training, Functional Mobility Training, Stair training, Safety Education & Training, Positioning, ADL/Self-care Training  Safety Devices  Type of devices: Call light within reach, Chair alarm in place, Left in chair, Telesitter in use, Gait belt, All fall risk precautions in place  Restraints  Initially in place: No     Therapy Time   Individual Concurrent Group Co-treatment   Time In       0830   Time Out       0930   Minutes       60        Timed Code Treatment Minutes:  60    Total Treatment Minutes:  [address], [address] [place], [place] [id number]

## 2021-07-01 NOTE — PLAN OF CARE
Continues atb course for UTI / pneumonia. Cognition better. Now on assessment says correct mo, yr, place, & that he's had a stoke (month, sometimes year he knew before. Never place, even w/ choices). Some episodes of being continent w/ urinal. At one point did want to get up for the day until explained to him that it was almost midnight. No pain, offered something to help him sleep & he declined      Problem: Falls - Risk of:  Goal: Will remain free from falls  Description: Will remain free from falls  Outcome: Ongoing  Note: Education Provided as followed:  Family/Patient made aware of the tailored interventions falls plan using the TIPS TOOL. Goal: Absence of physical injury  Description: Absence of physical injury  Outcome: Ongoing     Problem: Skin Integrity:  Goal: Will show no infection signs and symptoms  Description: Will show no infection signs and symptoms  Outcome: Ongoing  Goal: Absence of new skin breakdown  Description: Absence of new skin breakdown  Outcome: Ongoing     Problem: Confusion - Acute:  Goal: Absence of continued neurological deterioration signs and symptoms  Description: Absence of continued neurological deterioration signs and symptoms  Outcome: Ongoing  Goal: Mental status will be restored to baseline  Description: Mental status will be restored to baseline  Outcome: Ongoing     Problem: Discharge Planning:  Goal: Ability to perform activities of daily living will improve  Description: Ability to perform activities of daily living will improve  Outcome: Ongoing  Goal: Participates in care planning  Description: Participates in care planning  Outcome: Ongoing     Problem: Injury - Risk of, Physical Injury:  Goal: Will remain free from falls  Description: Will remain free from falls  Outcome: Ongoing  Note: Education Provided as followed:  Family/Patient made aware of the tailored interventions falls plan using the TIPS TOOL.   Goal: Absence of physical injury  Description: Absence of physical injury  Outcome: Ongoing     Problem: Mood - Altered:  Goal: Mood stable  Description: Mood stable  Outcome: Ongoing  Goal: Absence of abusive behavior  Description: Absence of abusive behavior  Outcome: Ongoing  Goal: Verbalizations of feeling emotionally comfortable while being cared for will increase  Description: Verbalizations of feeling emotionally comfortable while being cared for will increase  Outcome: Ongoing     Problem: Psychomotor Activity - Altered:  Goal: Absence of psychomotor disturbance signs and symptoms  Description: Absence of psychomotor disturbance signs and symptoms  Outcome: Ongoing     Problem: Sensory Perception - Impaired:  Goal: Demonstrations of improved sensory functioning will increase  Description: Demonstrations of improved sensory functioning will increase  Outcome: Ongoing  Goal: Decrease in sensory misperception frequency  Description: Decrease in sensory misperception frequency  Outcome: Ongoing  Goal: Able to refrain from responding to false sensory perceptions  Description: Able to refrain from responding to false sensory perceptions  Outcome: Ongoing  Goal: Demonstrates accurate environmental perceptions  Description: Demonstrates accurate environmental perceptions  Outcome: Ongoing  Goal: Able to distinguish between reality-based and nonreality-based thinking  Description: Able to distinguish between reality-based and nonreality-based thinking  Outcome: Ongoing  Goal: Able to interrupt nonreality-based thinking  Description: Able to interrupt nonreality-based thinking  Outcome: Ongoing     Problem: Sleep Pattern Disturbance:  Goal: Appears well-rested  Description: Appears well-rested  Outcome: Ongoing     Problem: IP BLADDER/VOIDING  Goal: LTG - Patient will utilize adaptive techniques/equipment to complete bladder elimination  Outcome: Ongoing  Goal: STG - Patient demonstrates no accidents  Outcome: Ongoing  Goal: STG - Patient will state signs and symptoms of UTI  Outcome: Ongoing  Goal: STG - patient will be able to empty bladder  Outcome: Ongoing     Problem: IP BOWEL ELIMINATION  Goal: LTG - patient will have regular and routine bowel evacuation  Outcome: Ongoing     Problem: Neurological  Goal: Maximum potential motor/sensory/cognitive function  Outcome: Ongoing

## 2021-07-02 PROCEDURE — 97530 THERAPEUTIC ACTIVITIES: CPT

## 2021-07-02 PROCEDURE — 6370000000 HC RX 637 (ALT 250 FOR IP): Performed by: PHYSICAL MEDICINE & REHABILITATION

## 2021-07-02 PROCEDURE — 6360000002 HC RX W HCPCS: Performed by: PHYSICAL MEDICINE & REHABILITATION

## 2021-07-02 PROCEDURE — 97535 SELF CARE MNGMENT TRAINING: CPT

## 2021-07-02 PROCEDURE — 97129 THER IVNTJ 1ST 15 MIN: CPT

## 2021-07-02 PROCEDURE — 92526 ORAL FUNCTION THERAPY: CPT

## 2021-07-02 PROCEDURE — 97130 THER IVNTJ EA ADDL 15 MIN: CPT

## 2021-07-02 PROCEDURE — 1280000000 HC REHAB R&B

## 2021-07-02 PROCEDURE — 6370000000 HC RX 637 (ALT 250 FOR IP): Performed by: NURSE PRACTITIONER

## 2021-07-02 RX ORDER — ESCITALOPRAM OXALATE 10 MG/1
5 TABLET ORAL DAILY
Status: DISCONTINUED | OUTPATIENT
Start: 2021-07-02 | End: 2021-07-16 | Stop reason: HOSPADM

## 2021-07-02 RX ADMIN — LISINOPRIL 5 MG: 5 TABLET ORAL at 08:02

## 2021-07-02 RX ADMIN — ENOXAPARIN SODIUM 40 MG: 40 INJECTION SUBCUTANEOUS at 08:02

## 2021-07-02 RX ADMIN — FINASTERIDE 5 MG: 5 TABLET, FILM COATED ORAL at 08:02

## 2021-07-02 RX ADMIN — CLOPIDOGREL BISULFATE 75 MG: 75 TABLET ORAL at 08:02

## 2021-07-02 RX ADMIN — TRAMADOL HYDROCHLORIDE 50 MG: 50 TABLET, FILM COATED ORAL at 10:05

## 2021-07-02 RX ADMIN — ESCITALOPRAM OXALATE 5 MG: 10 TABLET ORAL at 08:02

## 2021-07-02 RX ADMIN — ASPIRIN 81 MG: 81 TABLET, CHEWABLE ORAL at 08:02

## 2021-07-02 RX ADMIN — ATORVASTATIN CALCIUM 40 MG: 40 TABLET, FILM COATED ORAL at 21:02

## 2021-07-02 RX ADMIN — TAMSULOSIN HYDROCHLORIDE 0.4 MG: 0.4 CAPSULE ORAL at 08:02

## 2021-07-02 RX ADMIN — LEVOFLOXACIN 500 MG: 500 TABLET, FILM COATED ORAL at 08:03

## 2021-07-02 RX ADMIN — ATENOLOL 25 MG: 50 TABLET ORAL at 08:03

## 2021-07-02 ASSESSMENT — PAIN SCALES - GENERAL
PAINLEVEL_OUTOF10: 3
PAINLEVEL_OUTOF10: 6

## 2021-07-02 NOTE — PLAN OF CARE
Patient medicated for leg pain assisted back bed with the use of the hubert lift, patient leans to the left side

## 2021-07-02 NOTE — PROGRESS NOTES
Physical Therapy  Facility/Department: [facility] ACUTE REHAB UNIT  Daily Treatment Note  NAME: Serafin Whitley  : 1935  MRN: [id number]    Date of Service: 2021    Discharge Recommendations:  24 hour supervision or assist, Home with Home health PT, S Level 3   PT Equipment Recommendations  Equipment Needed: Yes  Other: TBD with progress    Assessment   Body structures, Functions, Activity limitations: Decreased functional mobility ; Decreased balance;Decreased coordination;Decreased endurance;Decreased strength;Decreased posture;Decreased ADL status; Decreased safe awareness;Decreased ROM  Assessment: Pt continues to require extensive assist of 2 for all functional mobility and ADL completion at this time with ongoing extensive (L) lateral lean + pushing in both seated and standing position. SPT toilet transfer initiated on this date. Pt remains non ambulatory secondary to poor standing balance. Treatment Diagnosis: decreased balance, coordination, and functional mobiilty  Prognosis: Fair  PT Education: Goals;PT Role;Plan of Care;Transfer Training;General Safety; Functional Mobility Training  Patient Education: Pt will require reinforcement secondary to cognitive deficits. Barriers to Learning: cognition  REQUIRES PT FOLLOW UP: Yes  Activity Tolerance  Activity Tolerance: Patient limited by endurance; Patient limited by cognitive status  Activity Tolerance: Pt had ongoing episodes of increased respiratory pattern with stare and decreased command following within therapy session. Patient Diagnosis(es): CVA     has a past medical history of Dementia ([facility] [address] HTN (hypertension), Hyperlipidemia, and PONV (postoperative nausea and vomiting). has a past surgical history that includes Knee arthroscopy; Colonoscopy; eye surgery (Right, 2014); Cystoscopy (2018); and TURP (2018).     Restrictions  Restrictions/Precautions  Restrictions/Precautions: Fall Risk, Modified Diet  Required Braces or Orthoses?: No  Position Activity Restriction  Other position/activity restrictions: 81year old male with a history of CAD, dementia, PVD who was admitted to [facility] on 6/19 with left-sided weakness. He was recently evaluated at this facility for an episode of syncope. Work-up revealed a right watershed ischemic stroke in the right frontal and parietal regions. He was initiated on Plavix in addition to aspirin and statin. A1c 5.3. LDL 88. Echo was unremarkable for an embolic source. He was evaluated by therapy and suggested to continue in an inpatient setting prior to returning home. He has no current complaints today. He is currently sitting up with therapy and having intermittent episodes of increased respirations with decreased heart rate. Subjective   General  Chart Reviewed: Yes  Additional Pertinent Hx: dementia, HTN, DM  Response To Previous Treatment: Patient with no complaints from previous session. Family / Caregiver Present: No  Referring Practitioner: Dr Nico Quezada  Subjective  Subjective: Pt in seated in recliner on arrival.  Agreeable to therapy session. Pain Screening  Patient Currently in Pain: No  Vital Signs  Patient Currently in Pain: No       Objective   Transfers  Sit to Stand: 2 Person Assistance (mod (A) of 2 in // bars, mod/max (A) of 2 to par[place] rail)  Stand to sit: 2 Person Assistance  Stand Pivot Transfers: 2 Person Assistance (max (A) of 2 recliner => w/c)  Comment: extensive (L) lateral lean in both seated prep and standing transfer completion  Ambulation  Ambulation?: No     Balance  Posture: Poor  Sitting - Static: Poor  Sitting - Dynamic: Poor;-  Standing - Static: Poor;-  Standing - Dynamic: Poor;-  Comments: consistent (L) lateral lean in all positions. Patient able to partially self correct in seated position. Requires max (A) to obtain midline position.       Comment: Standing completed in // bars x 3 completions with heavy (L) lateral lean with pushing pattern in (R) UE/LE. Pushing pattern improved with (R) UE in flex/ER positioning with use of dowel gricel vs // bar use. Pt noted to have delayed activation of stand but with time presents with increased (B) LE engagement. Stand pivot toilet transfer completed w/c => raised seat with use of horiziontal grab bar at max (A) of 2 with difficulty completing turn. Multiple static stands completed from toilet surface due to bowel incontinence and multiple episodes of urinary incontinence during ADL completion. Patient requires max (A) of 2 for toileting including dependent pericare and LB clothing management. Pivot disc utilized for toilet => w/c transfer with ongoing max (A) of 2 but improved LE positoining. Static stand completed at par[place] rail with extended [address] way in attempt to improve midline orientation but continues to require max (A) of 2. Patient transitioned into high back w/c for improved resting positioning at conclusion of session.      Goals  Short term goals  Time Frame for Short term goals: 1-2 weeks  Short term goal 1: Pt will perform all bed mobility with maxA of 1  Short term goal 2: Pt will perform SPT with maxA of 1  Short term goal 3: Pt will ambulate 8' with LRAD with maxA of 1  Short term goal 4: Pt will negotiate 1 step with LRAD and maxA of 1  Long term goals  Time Frame for Long term goals : 2-3 weeks  Long term goal 1: Pt will perform all bed mobility with CGA  Long term goal 2: Pt will perform all transfers with CGA  Long term goal 3: Pt will ambulate 48' with LRAD CGA  Long term goal 4: Pt will negotiate 12 steps with kapil  Patient Goals   Patient goals : pt unable to assess    Plan    Plan  Times per week: 60 minutes a day 5 days a week  Current Treatment Recommendations: Strengthening, Transfer Training, Endurance Training, Neuromuscular Re-education, Patient/Caregiver Education & Training, ROM, Wheelchair Mobility Training, Manual Therapy - Soft Tissue Mobilization, Equipment Evaluation, Education, & procurement, Balance Training, Gait Training, Functional Mobility Training, Stair training, Safety Education & Training, Positioning, ADL/Self-care Training  Safety Devices  Type of devices: Call light within reach, Chair alarm in place, Left in chair, Telesitter in use, Gait belt  Restraints  Initially in place: No     Therapy Time   Individual Concurrent Group Co-treatment   Time In       0830   Time Out       0930   Minutes       60   Timed Code Treatment Minutes: [address] PT, DPT - [id number]

## 2021-07-02 NOTE — PROGRESS NOTES
ACUTE REHAB UNIT  SPEECH/LANGUAGE PATHOLOGY      [x] Daily  [] Weekly Care Conference Note  [] Discharge    Patient:Reno Mason     :1935  [facility]:[id number]  Room #: GPQ-6514/5923-00   Rehab Dx/Hx: Acute embolic stroke [facility]) [T05.9]  Date of Admit: 2021      Precautions: falls and aspirations  Home situation: Per pt's daughter Bradly Rivera) pt lives with his grandson (22years old). Tomás Gaytan reports plan is for her and her family to move into his place to provide more assistance. ST Dx: Mild-moderate oropharyngeal dysphagia; Severe cognitive linguistic deficits; Moderate dysarthria / dysphonia    Daily Treatment Info:   Date: 2021     Tx session 1 Tx session 2   Pain Denied  Denied    Subjective     Pt up in recliner for session. Remained alert but with flat affect and delayed responses. Audible expiratory wheezing intermittently during session. RN aware. Pt in bed, fatigued after physical and occupational therapy. Willing to sit up and participate in session. Objective:  Goals     Pt will tolerate recommended diet and advanced trials with use of compensatory swallow strategies provided with < mod cues with no overt clinical s/s of aspiration or noted difficulty.      Breakfast   - soft/regular solids, mildly thick liquids   -Pt impulsive with bite size; limited response to verbal cues to minimize   - pt tolerated all presented consistencies without overt s/s of aspiration   - increased work of breathing after breakfast meal; unable to correlate relation to dysphagia however, increases risk for aspiration with the intermittent changes in breath quality     Water protocol parameters   - Pt recalled 0/3 parameters; did not recall any discussion re: thin water   - assisted in oral care to facilitate water protocol   - pt with delayed coughing after oral care   - O2 saturation remained at 94%   Given limited comprehension; recommend strictly adhering to staff implementation of water protocol     O2 saturation range between 94-05% during session. Targeted via cold stimulation   - attempted to apply resistance; pt followed directions for 40% of presentations   - elicited 18 swallows     Swallow against resistance (pudding via straw)   - pt able to propel approximately 3/4 of the straw   - required mod-max cues to complete the task          Pt will respond and/or initiate action upon verbal prompt within 3 seconds in 4 out of 5 opportunities without repetition. Following directions to complete numerical sequence task   - connected numbers 1-10  - required max cues to maintain place   - required mod cues for comprehension of directions   - required significantly extended time   - appeared to require increased cues with stimuli on the left side of the page    Goal not targeted this session. Pt will use visual aids/ strategies to state orientation to time, place, situation with 100% accuracy across 3 consecutive sessions. Utilized visual aid (digital clock) for accurate responses to temporal orientation questions. Disoriented to situation. Min cues for use of digital clock to re-orient to day of week. Responded to temporal concepts accurately once attending to visual aid     Oriented to place, city, state. Disoriented to situation \"I'm not sure\"; improved given f/2 choices      Pt will improve oral motor function, articulatory precision and breath support in connected speech via graded tasks to >80% acc with use of speech strategies. Goal not targeted this session. Goal not targeted this session. Pt will improve verbal initiation and thought organization for extended utterances (i.e. related to personal hx/ recall of recent events, etc.) >75% acc. Pt unable to generate any home medications nor diagnoses.   Personal history recall   - pt limited in responses, often deflected with \"I don't know\" or \"I'm not sure\"   - provided response to 50% of presentations   - basic Diagnosis: Moderate dysarthria/ dysphonia, pt with intermittent periods of tachypnea and reduced breath support impacting intelligibility at the short phrase/ sentence level with reduced articulatory precision, pt with hoarse, gravely wet vocal quality    Current Diet Order: ADULT DIET; Dysphagia - Soft and Bite Sized; Mildly Thick (Nectar)            Plan:     Frequency:  5days/week   60 minutes/day  Discharge Recommendations:   Barriers: Pre-existing cognitive deficit; severity of deficits; Limited initiation  Discharge Recommendations:  [] Home independently  [x] Home with assistance [x]  24 hour supervision  [] SNF [x] Other: TBD  Continued SLP Treatment:  [x] Yes [] No [] TBD based on progress while on ARU [] Vital Stim indicated [] Other:   Estimated discharge date: 7/16/21    Type of Total Treatment Minutes   Session 1   Session 2   Time In 0800 1120   Time Out 0830 1150   Timed Code Minutes  15 15   Individual Treatment Minutes  30 30   Co-Treatment Minutes      Group Treatment Minutes      Concurrent Treatment Minutes        TOTAL DAILY MINUTES:  60    Electronically Signed by     Leroy Guido M.A.  CCC-SLP [facility] [id number]  Speech-Language Pathologist [phone]  7/2/2021 11:41 AM

## 2021-07-02 NOTE — PROGRESS NOTES
1956: Assessment complete, VSS, obvious deficit to L UE and LE, pt able to move upper and lower extremities on command but obvious weakness noted, pt able to answer most questions with minimal delay, pt incontinent of stool, pt able to follow direction to turn himself in bed with minimal assist, discussed care plan, pt mutually agrees, assisted pt to reposition up in the bed using verbal cues, tolerated well, no other needs at this time, will continue monitoring. 2230: pt awake in bed, offered trazodone for sleep, pt declines, room dark, door closed partially to minimize disturbances and promote rest, will continue monitoring. 0130: pt still awake, denies needs, still declines trazodone for sleep.    0400: pt finally sleeping, no s/s of distress, will continue monitoring.   Agustin Cabral RN

## 2021-07-02 NOTE — PROGRESS NOTES
Bennett Nelson  7/2/2021  [id number]    Chief Complaint: Acute embolic stroke ([address]    Subjective:   No overnight events. No current complaints. Discussed starting Lexapro for motor recovery, patient agreeable. Still awaiting cardiac monitor. ROS: No CP, SOB, dyspnea    Objective:  Patient Vitals for the past 24 hrs:   BP Temp Temp src Pulse Resp SpO2 Height   07/01/21 1945 (!) 150/71 98.2 °F (36.8 °C) Oral 58 15 92 %    07/01/21 1033 138/78 97.9 °F (36.6 °C) Oral 66 17 97 % 6' 2\" (1.88 m)     Gen: No distress, pleasant. Resting in bedside chair  HEENT: Normocephalic, atraumatic. CV: Regular rate and rhythm. No MRG   Resp: No respiratory distress. CTAB. Abd: Soft, nontender nondistended  Ext: No edema. Neuro: Alert, poor initiation, appropriately interactive. Left neglect    Laboratory data: Available via EMR. Therapy progress:  PT  Position Activity Restriction  Other position/activity restrictions: 81year old male with a history of CAD, dementia, PVD who was admitted to [facility] on 6/19 with left-sided weakness. He was recently evaluated at this facility for an episode of syncope. Work-up revealed a right watershed ischemic stroke in the right frontal and parietal regions. He was initiated on Plavix in addition to aspirin and statin. A1c 5.3. LDL 88. Echo was unremarkable for an embolic source. He was evaluated by therapy and suggested to continue in an inpatient setting prior to returning home. He has no current complaints today. He is currently sitting up with therapy and having intermittent episodes of increased respirations with decreased heart rate.   Objective     Sit to Stand: 2 Person Assistance, Dependent/Total (maxA of 2)  Stand to sit: 2 Person Assistance, Dependent/Total (maxA of 2)  Bed to Chair: Dependent/Total, 2 Person Assistance (maxA of 2 in parallel bars)     OT  PT Equipment Recommendations  Equipment Needed: Yes  Other: TBD with progress     Assessment SLP                Body mass index is 29.08 kg/m². 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 Plavix for 21 days (7/10). PT/OT/SLP. Event monitor mentioned in discharge notes but no monitor with patient. Family to bring in. - nursing to investigate again today -- Start Lexapro for motor recovery     Dysphagia: dysphagia diet, SLP    Intermittent tachypnea: HR decreased during episodes. Working to obtain [employer] monitor. Consider seizure activity vs central etiology     CAD: ASA, statin      HTN: atenolol      Dementia: SLP     Urinary retention: flomax, proscar. PNA: suspect aspiration related. Levaquin     Bowels: Per protocol  Bladder: Per protocol   Sleep: Trazodone provided prn. Pain: tylenol, tramadol   DVT PPx: Lovenox   SANTIAGO: 7/16    Electronically signed by KELECHI Mcconnell CNP on 7/2/2021 at 7:36 AM    * This document was created using dictation software. While all precautions were taken to ensure accuracy, errors may have occurred. Please disregard any typographical errors. This patient has been seen, examined, and discussed with the nurse practitioner. I performed a face to face encounter with this patient on the above date. This note has been altered to reflect my own examination findings, impression, and recommendations.      Electronically signed by Alee Villalba MD on 7/2/2021 at 8:00 AM

## 2021-07-02 NOTE — PROGRESS NOTES
(04/04/2018); and TURP (04/26/2018). Restrictions  Restrictions/Precautions  Restrictions/Precautions: Fall Risk, Modified Diet  Required Braces or Orthoses?: No  Position Activity Restriction  Other position/activity restrictions: 81year old male with a history of CAD, dementia, PVD who was admitted to [facility] on 6/19 with left-sided weakness. He was recently evaluated at this facility for an episode of syncope. Work-up revealed a right watershed ischemic stroke in the right frontal and parietal regions. He was initiated on Plavix in addition to aspirin and statin. A1c 5.3. LDL 88. Echo was unremarkable for an embolic source. He was evaluated by therapy and suggested to continue in an inpatient setting prior to returning home. He has no current complaints today. He is currently sitting up with therapy and having intermittent episodes of increased respirations with decreased heart rate. Subjective   General  Chart Reviewed: Yes  Patient assessed for rehabilitation services?: Yes  Response to previous treatment: Patient with no complaints from previous session  Family / Caregiver Present: No  Diagnosis: CVA  Subjective  Subjective: Pt seated in recliner  upon entry, agreeable to OT/PT co-treatment  Vital Signs  Patient Currently in Pain: No     Objective    ADL  LE Dressing: Dependent/Total  Toileting: Dependent/Total  Additional Comments: Pt incontinent of stool while working w/ therapy. Multiple sit>stand transfers from commode in order to complete pericare-pt incontinent of urine multiple times while completing toileting.  Multiple clothing changes needed     Balance  Sitting Balance: Dependent/Total  Standing Balance: Dependent/Total  Standing Balance  Time: ~8 min total  Activity: Transfers, ADL completion  Comment: Pt w/ significant L lateral lean, occasional pushing behavior significantly limiting upright posture and independence  Toilet Transfers  Toilet - Technique: Stand x1  Short term goal 4: Toileting w/ assist x1  Short term goal 5: UB dressing w/ Min A  Long term goals  Time Frame for Long term goals : 3-4 weeks  Long term goal 1: Functional transfer for ADL completion w/ supervision  Long term goal 2: Bathing w/ supervision  Long term goal 3: LB dressing w/ supervision  Long term goal 4: Toileting w/ supervision  Long term goal 5: UB dressing w/ supervision  Patient Goals   Patient goals :  To go home       Therapy Time   Individual Concurrent Group Co-treatment   Time In       0830   Time Out       0930   Minutes       60        Timed Code Treatment Minutes:  60 Minutes    Total Treatment Minutes:  60 minutes       Margarita Mercer, OT   Margarita Mercer OTR/L, [place] #[id number]

## 2021-07-03 PROCEDURE — 6360000002 HC RX W HCPCS: Performed by: PHYSICAL MEDICINE & REHABILITATION

## 2021-07-03 PROCEDURE — 6370000000 HC RX 637 (ALT 250 FOR IP): Performed by: PHYSICAL MEDICINE & REHABILITATION

## 2021-07-03 PROCEDURE — 92526 ORAL FUNCTION THERAPY: CPT

## 2021-07-03 PROCEDURE — 6370000000 HC RX 637 (ALT 250 FOR IP): Performed by: NURSE PRACTITIONER

## 2021-07-03 PROCEDURE — 1280000000 HC REHAB R&B

## 2021-07-03 PROCEDURE — 97129 THER IVNTJ 1ST 15 MIN: CPT

## 2021-07-03 PROCEDURE — 97530 THERAPEUTIC ACTIVITIES: CPT

## 2021-07-03 PROCEDURE — 97535 SELF CARE MNGMENT TRAINING: CPT

## 2021-07-03 PROCEDURE — 97130 THER IVNTJ EA ADDL 15 MIN: CPT

## 2021-07-03 RX ADMIN — ATORVASTATIN CALCIUM 40 MG: 40 TABLET, FILM COATED ORAL at 21:44

## 2021-07-03 RX ADMIN — LISINOPRIL 5 MG: 5 TABLET ORAL at 08:45

## 2021-07-03 RX ADMIN — FINASTERIDE 5 MG: 5 TABLET, FILM COATED ORAL at 08:46

## 2021-07-03 RX ADMIN — TRAMADOL HYDROCHLORIDE 50 MG: 50 TABLET, FILM COATED ORAL at 08:45

## 2021-07-03 RX ADMIN — CLOPIDOGREL BISULFATE 75 MG: 75 TABLET ORAL at 08:45

## 2021-07-03 RX ADMIN — TAMSULOSIN HYDROCHLORIDE 0.4 MG: 0.4 CAPSULE ORAL at 08:45

## 2021-07-03 RX ADMIN — ESCITALOPRAM OXALATE 5 MG: 10 TABLET ORAL at 08:45

## 2021-07-03 RX ADMIN — ATENOLOL 25 MG: 50 TABLET ORAL at 08:45

## 2021-07-03 RX ADMIN — ASPIRIN 81 MG: 81 TABLET, CHEWABLE ORAL at 08:45

## 2021-07-03 RX ADMIN — LEVOFLOXACIN 500 MG: 500 TABLET, FILM COATED ORAL at 08:45

## 2021-07-03 RX ADMIN — ENOXAPARIN SODIUM 40 MG: 40 INJECTION SUBCUTANEOUS at 08:46

## 2021-07-03 ASSESSMENT — PAIN SCALES - GENERAL
PAINLEVEL_OUTOF10: 3
PAINLEVEL_OUTOF10: 0

## 2021-07-03 NOTE — PROGRESS NOTES
Physical Therapy  Facility/Department: [facility] ACUTE REHAB UNIT  Daily Treatment Note  NAME: Bennett Nelson  : 1935  MRN: [id number]    Date of Service: 7/3/2021    Discharge Recommendations:  24 hour supervision or assist, Home with Home health PT, S Level 3   PT Equipment Recommendations  Equipment Needed: Yes  Other: TBD with progress. Currently, pt will require a sukhwinder lift and manual w/c    Assessment   Body structures, Functions, Activity limitations: Decreased functional mobility ; Decreased balance;Decreased coordination;Decreased endurance;Decreased strength;Decreased posture;Decreased ADL status; Decreased safe awareness;Decreased ROM  Assessment: Pt continues to require extensive assist of 2 for all functional mobility and ADL completion at this time with ongoing extensive (L) lateral lean + pushing in both seated and standing position. Pt remains non ambulatory secondary to poor standing and sitting balance. Treatment Diagnosis: decreased balance, coordination, and functional mobiilty  Prognosis: Fair  PT Education: Goals;PT Role;Plan of Care;Transfer Training;General Safety; Functional Mobility Training  Patient Education: Pt will require reinforcement secondary to cognitive deficits. Barriers to Learning: cognition  REQUIRES PT FOLLOW UP: Yes  Activity Tolerance  Activity Tolerance: Patient limited by endurance; Patient limited by cognitive status     Patient Diagnosis(es): CVA. has a past medical history of Dementia ([address] HTN (hypertension), Hyperlipidemia, and PONV (postoperative nausea and vomiting). has a past surgical history that includes Knee arthroscopy; Colonoscopy; eye surgery (Right, 2014); Cystoscopy (2018); and TURP (2018).     Restrictions  Restrictions/Precautions  Restrictions/Precautions: Fall Risk, Modified Diet  Required Braces or Orthoses?: No  Position Activity Restriction  Other position/activity restrictions: 81year old male with a history of CAD, dementia, PVD who was admitted to [facility] on 6/19 with left-sided weakness. He was recently evaluated at this facility for an episode of syncope. Work-up revealed a right watershed ischemic stroke in the right frontal and parietal regions. He was initiated on Plavix in addition to aspirin and statin. A1c 5.3. LDL 88. Echo was unremarkable for an embolic source. He was evaluated by therapy and suggested to continue in an inpatient setting prior to returning home. He has no current complaints today. He is currently sitting up with therapy and having intermittent episodes of increased respirations with decreased heart rate. Subjective   General  Chart Reviewed: Yes  Additional Pertinent Hx: dementia, HTN, DM  Response To Previous Treatment: Patient with no complaints from previous session. Family / Caregiver Present: No  Subjective  Subjective: Pt in bed on arrival.  Agreeable to therapy services. Pain Screening  Patient Currently in Pain: Denies  Vital Signs  Patient Currently in Pain: Denies       Orientation     Cognition      Objective   Bed mobility  Rolling to Left: Maximum assistance  Rolling to Right: Maximum assistance  Supine to Sit: Dependent/Total (maxA of 2)  Transfers  Sit to Stand: 2 Person Assistance;Dependent/Total (mod-maxA of 2)  Stand to sit: 2 Person Assistance;Dependent/Total (maxA of 2)  Bed to Chair: Dependent/Total;2 Person Assistance (maxA of 2)  Stand Pivot Transfers: 2 Person Assistance;Dependent/Total (maxA of 2)  Ambulation  Ambulation?: No  Stairs/Curb  Stairs?: No     Balance  Posture: Poor  Sitting - Static: Poor  Sitting - Dynamic: Poor;-  Standing - Static: Poor;-  Standing - Dynamic: Poor;-            Comment: Performed bed mobility as documented above for pericare and dressing. Performed ADL in bed as documented by OT. Performed STS as documetned above and transfer to w/c as documetned above. Performed SPT from w/c to EOM as documented above.   Performed seated cone reaching with R and L UE with signficant delay to command for L motor planning X 6 cones each with stacking and pt demonstrating decreased activation of release of cone with LUE. Pt required totalA for balance due to L lean. Performed lateral forearm props on RUE with maxA for balance with assist at LUE for side stretch. Pt returned to room and perfromed SPT from w/c to recliner with maxA of 2. Pt remained in recliner with alarm on and all needs in reach. Pt required significant external support for all sitting balance.               G-Code     OutComes Score                                                     AM-PAC Score             Goals  Short term goals  Time Frame for Short term goals: 1-2 weeks  Short term goal 1: Pt will perform all bed mobility with maxA of 1  Short term goal 2: Pt will perform SPT with maxA of 1  Short term goal 3: Pt will ambulate 8' with LRAD with maxA of 1  Short term goal 4: Pt will negotiate 1 step with LRAD and maxA of 1  Long term goals  Time Frame for Long term goals : 2-3 weeks  Long term goal 1: Pt will perform all bed mobility with CGA  Long term goal 2: Pt will perform all transfers with CGA  Long term goal 3: Pt will ambulate 48' with LRAD CGA  Long term goal 4: Pt will negotiate 12 steps with kapil  Patient Goals   Patient goals : pt unable to assess    Plan    Plan  Times per week: 60 minutes a day 5 days a week  Current Treatment Recommendations: Strengthening, Transfer Training, Endurance Training, Neuromuscular Re-education, Patient/Caregiver Education & Training, ROM, Wheelchair Mobility Training, Manual Therapy - Soft Tissue Mobilization, Equipment Evaluation, Education, & procurement, Balance Training, Gait Training, Functional Mobility Training, Stair training, Safety Education & Training, Positioning, ADL/Self-care Training  Safety Devices  Type of devices: Call light within reach, Chair alarm in place, Left in chair, Telesitter in use, Gait belt, All fall risk precautions in place  Restraints  Initially in place: No     Therapy Time   Individual Concurrent Group Co-treatment   Time In       0830   Time Out       0930   Minutes       60          Timed Code Treatment Minutes:  60    Total Treatment Minutes:  [address], [facility] [place], [place] [address]

## 2021-07-03 NOTE — PROGRESS NOTES
ACUTE REHAB UNIT  SPEECH/LANGUAGE PATHOLOGY      [x] Daily  [] Weekly Care Conference Note  [] Discharge    Patient:Reno Valdez Medal     :1935  TYYK:[id number]  Room #: TCM-8025/1364-55   Rehab Dx/Hx: Acute embolic stroke [facility]) [V05.9]  Date of Admit: 2021      Precautions: falls and aspirations  Home situation: Per pt's daughter Sneha Rodriguez) pt lives with his grandson (22years old). BODØ reports plan is for her and her family to move into his place to provide more assistance. ST Dx: Mild-moderate oropharyngeal dysphagia; Severe cognitive linguistic deficits; Moderate dysarthria / dysphonia    Daily Treatment Info:   Date: 7/3/2021     Tx session 1 Tx session 2   Pain Denied  Denied    Subjective     Pt seen bedside  Pt was reclined in recliner watching TV  Pt was receptive to putting feet on the ground for upright positioning for session and po trials  Pt was receptive to therapy   Pt seen bedside  Pt was reclined in recliner  RN did assist this SLP in repositioning pt as pt with complaints of bottom pain     Objective:  Goals     Pt will tolerate recommended diet and advanced trials with use of compensatory swallow strategies provided with < mod cues with no overt clinical s/s of aspiration or noted difficulty. Thin H20 presentations via cup: small sip; hold ; and \"effortful \" swallow: 2 episodes of voice quality change and discontinued as pt with reduced carryover of compensatory strategides Not targeted       Pt will respond and/or initiate action upon verbal prompt within 3 seconds in 4 out of 5 opportunities without repetition. 1 step commands (psychomotor commands): 3 second or under rate of response: 85%    Wh? Response to basic biographical information: 3 second or under rate of response: 80%    Wh?  Response to responsive naming and/or concrete categorization task: 3 second or under rate of response: 75%    Pt required repetition or more time for questions regarding temporal orientation or daily therapies 1 step commands in a 4 step task ( ie incorporating betzaida techniques when RN assisted SLP in repositioning pt in recliner and when managing left side when bringing table in front of pt): cue dependent for left body attention and betzaida techniques; pt was able to demonstrate with visual demons/verbal cues with intermittent physical/tactile cue and extra time. Pt will use visual aids/ strategies to state orientation to time, place, situation with 100% accuracy across 3 consecutive sessions. Without cues: not oriented to place; time; date    With use of visual cues (2 digital clocks): set up and intermittent cues for 100% to questions. Pt was oriented to recent stroke and left sided weakness Again targeted use of digital visual information on table top and on counter in front of pt for time/date orientation  · Set up prompt and intermittent cues       Pt will improve oral motor function, articulatory precision and breath support in connected speech via graded tasks to >80% acc with use of speech strategies. not targeted this session. Goal not targeted this session. Pt will improve verbal initiation and thought organization for extended utterances (i.e. related to personal hx/ recall of recent events, etc.) >75% acc. Social topic exchange for targeted 4 topic related exchanges: No pt initiation but did respond to inquiries    Recall of therapy activities of am/ am meal: <50%  Personal history recall   - Pt provided biographical information of full name;  ; full address; prior  Profession which were accurate based on documentation in chart. Pt was able to ID therapy when presented with abbreviation: 2/3     Patient will complete fx problem solving tasks and demonstrate insight into limitations and impact on daily functioning with >75% acc, min cues.    Table top and use of call light: externa cues for location; IND for finding nurse light and TV button    Prompting for generalization in use of call light   PS/reasoning for concrete task incorporating working memory and recall of 1-2 items for category inclusion rule application task (auditory memory): >90% with mild to moderate repetition    PS/reasoning for concrete task incorporating working memory and recall of 3 items for category inclusion rule application task (auditory memory): >90% with max repetition    Making inferences (concrete) using 3 pc information and targeting high frequency items: 88%   Other areas targeted: Laryngeal ex targeting : Max models  · Phonation duration of 'ee'  · Glottals on expiratory effort: 3-5 reps   · Pitch variation low to high to low  · BOT with pitch variation low to high with falsetto hold  Lingual shaping ex for lingual hold: max cues    Education:   Ongoing pt ed; need for repetition of ed Ongoing pt ed in treatment poc; progress. Pt needs repeittion   Safety Devices: [x] Call light within reach  [x] Chair alarm activated  [] Bed alarm activated  [x] Other: camera in room [x] Call light within reach  [x] Chair alarm activated  [] Bed alarm activated  [x] Other: camera in room     Assessment:   Speech Therapy Diagnosis  Cognitive Diagnosis: Pt presents with severe cognitive linguistic deficits due to reduced orienation, reduced processing speed, reduced insight into current situation, reduced immediate/ working/ short term memory with fx problem solving difficulty. Per chart review pt with baseline dementia, did not complete IADLs at home prior to CVA but was able to be safely left alone with daily check ins from family. Aphasia Diagnosis: Significantly reduced processing speed/ difficulty following multi-step commands. Unable to discern if due to pt being [place] vs reduced auditory comprehension/ initiation, requires further assessment.  Pt c/o word finding diffiuclty, pt with minimal verbal output during evaluation, requires ongoing assessment for goal generation. Speech Diagnosis: Moderate dysarthria/ dysphonia, pt with intermittent periods of tachypnea and reduced breath support impacting intelligibility at the short phrase/ sentence level with reduced articulatory precision, pt with hoarse, gravely wet vocal quality    Current Diet Order: ADULT DIET; Dysphagia - Soft and Bite Sized; Mildly Thick (Nectar)            Plan:     Frequency:  5days/week   60 minutes/day  Discharge Recommendations:   Barriers: Pre-existing cognitive deficit; severity of deficits; Limited initiation  Discharge Recommendations:  [] Home independently  [x] Home with assistance [x]  24 hour supervision  [] SNF [x] Other: TBD  Continued SLP Treatment:  [x] Yes [] No [] TBD based on progress while on ARU [] Vital Stim indicated [] Other:   Estimated discharge date: 7/16/21    Type of Total Treatment Minutes   Session 1   Session 2   Time In 1018 1115   Time Out 1048 1145   Timed Code Minutes  15 30   Individual Treatment Minutes  30 30   Co-Treatment Minutes  n/a n/a   Group Treatment Minutes  n/a n/a   Concurrent Treatment Minutes  n/a n/a     TOTAL DAILY MINUTES:  60    Electronically Signed by     Jared Pryor. Arvin,MS,CCC,SLP [id number]  Speech and Language Pathologist  7/3/2021 12:49 PM

## 2021-07-03 NOTE — PROGRESS NOTES
Occupational Therapy  Facility/Department: [facility] ACUTE REHAB UNIT  Daily Treatment Note  NAME: Evelia Lau  : 1935  MRN: [id number]    Date of Service: 7/3/2021    Discharge Recommendations:  Continue to assess pending progress, Patient would benefit from continued therapy after discharge  OT Equipment Recommendations  Other: Continue to assess pending pt progress    Assessment   Performance deficits / Impairments: Decreased functional mobility ; Decreased endurance;Decreased coordination;Decreased posture;Decreased ADL status; Decreased ROM; Decreased strength;Decreased balance;Decreased vision/visual deficit; Decreased high-level IADLs;Decreased safe awareness;Decreased cognition;Decreased fine motor control  Assessment: 81 y.o. male presents w/ the above deficits which are impacting his occupational performance. Pt would benefit from continued therapy during inpatient stay in order to maximize safety and independence upon discharge. Treatment Diagnosis: Multiple performance deficits s/p CVA  OT Education: OT Role;Plan of Care;ADL Adaptive Strategies;Transfer Training;Orientation  Patient Education: Pt is not an independent learner  Barriers to Learning: Cognition  REQUIRES OT FOLLOW UP: Yes  Activity Tolerance  Activity Tolerance: Patient Tolerated treatment well;Patient limited by fatigue  Activity Tolerance: Pt continues to demo L lateral lean and pushing behavior which as limited pt's ability to fully participate in therapy. Safety Devices  Safety Devices in place: Yes  Type of devices: All fall risk precautions in place;Call light within reach; Chair alarm in place;Gait belt;Patient at risk for falls; Left in chair;Nurse notified         Patient Diagnosis(es): There were no encounter diagnoses. has a past medical history of Dementia ([address] HTN (hypertension), Hyperlipidemia, and PONV (postoperative nausea and vomiting). has a past surgical history that includes Knee arthroscopy;  Colonoscopy; eye surgery (Right, 11/2014); Cystoscopy (04/04/2018); and TURP (04/26/2018). Restrictions  Restrictions/Precautions  Restrictions/Precautions: Fall Risk, Modified Diet  Required Braces or Orthoses?: No  Position Activity Restriction  Other position/activity restrictions: 81year old male with a history of CAD, dementia, PVD who was admitted to [facility] on 6/19 with left-sided weakness. He was recently evaluated at this facility for an episode of syncope. Work-up revealed a right watershed ischemic stroke in the right frontal and parietal regions. He was initiated on Plavix in addition to aspirin and statin. A1c 5.3. LDL 88. Echo was unremarkable for an embolic source. He was evaluated by therapy and suggested to continue in an inpatient setting prior to returning home. He has no current complaints today. He is currently sitting up with therapy and having intermittent episodes of increased respirations with decreased heart rate. Subjective   General  Chart Reviewed: Yes  Patient assessed for rehabilitation services?: Yes  Response to previous treatment: Patient with no complaints from previous session  Family / Caregiver Present: No  Diagnosis: CVA  Subjective  Subjective: Pt was supine in bed upon arrival to therapy session. Pt was confused but pleasant and agreeable to therapy session. Pre Treatment Pain Screening  Comments / Details: Pt denies pain, but demonstrates facial grimmacing throughout intervention.   Vital Signs  Patient Currently in Pain: Denies   Orientation     Objective    ADL  UE Bathing: Maximum assistance (pt completed UB bathing supine in bed with cloth provided; cues throughout for initiation, sequencing, and appropriate use of cloth)  LE Bathing: Dependent/Total (bathing completed supine in bed with total assist)  UE Dressing: Maximum assistance (maximum assistance cues provided for facilitation of donning shirt sitting up in bed)  LE Dressing: Dependent/Total  Toileting: Dependent/Total  Additional Comments: Pt incontinent of stool while working w/ therapy. Pt with bed mobility rolling to L/R side for completion of pericare with total assistance. Balance  Sitting Balance: Dependent/Total  Standing Balance: Dependent/Total  Standing Balance  Time: ~5 minutes total throughout session  Activity: Transfers, ADL completion  Comment: Pt w/ significant L lateral lean, occasional pushing behavior significantly limiting upright posture and independence  Functional Mobility  Functional Mobility Comments: Pt unable to complete functional mobility at this time, transported to therapy gym with total asisstance in wheelchair  Wheelchair Bed Transfers  Wheelchair/Bed - Technique: Stand pivot  Equipment Used: Wheelchair  Level of Asssistance: Dependent/Total;2 Person assistance  Wheelchair Transfers Comments: max ax2  Bed mobility  Rolling to Left: Maximum assistance;2 Person assistance  Rolling to Right: Maximum assistance;2 Person assistance  Supine to Sit: Dependent/Total;2 Person assistance (maxA of 2)  Comment: cues for initiation and hand placement  Transfers  Stand Pivot Transfers: 2 Person assistance;Dependent/Total (x2 person max assist from EOB to w/c, w/c to therapy gym mat, mat to w/c, w/c to recliner)  Sit to stand: 2 Person assistance;Dependent/Total  Stand to sit: 2 Person assistance;Dependent/Total  Transfer Comments: Max A x2   Cognition  Overall Cognitive Status: WNL  Arousal/Alertness: Delayed responses to stimuli  Following Commands: Follows one step commands with repetition; Follows one step commands with increased time  Memory: Decreased recall of biographical Information;Decreased short term memory;Decreased long term memory;Decreased recall of recent events  Safety Judgement: Decreased awareness of need for safety;Decreased awareness of need for assistance  Problem Solving: Decreased awareness of errors;Assistance required to identify errors made;Assistance required to correct errors made  Insights: Decreased awareness of deficits  Initiation: Requires cues for all  Sequencing: Requires cues for all  Cognition Comment: Increased processing time for verbal commands; pt w/ increased lethargy this date            Plan   Plan  Times per week: 60 min; 5-7x  Times per day: Daily  Current Treatment Recommendations: Strengthening, Patient/Caregiver Education & Training, Home Management Training, Equipment Evaluation, Education, & procurement, Balance Training, Neuromuscular Re-education, Functional Mobility Training, Endurance Training, Safety Education & Training, Self-Care / ADL    Goals  Short term goals  Time Frame for Short term goals: 1-2 weeks  Short term goal 1: Functional transfer for ADL completion w/ assist x1 - progressing 7/3/21  Short term goal 2: Bathing w/ assist x1 - progressing 7/3/21  Short term goal 3: LB dressing w/ assist x1 - progressing 7/3/21  Short term goal 4: Toileting w/ assist x1 - progressing 7/3/21  Short term goal 5: UB dressing w/ Min A - progressing 7/3/21  Long term goals  Time Frame for Long term goals : 3-4 weeks  Long term goal 1: Functional transfer for ADL completion w/ supervision  Long term goal 2: Bathing w/ supervision  Long term goal 3: LB dressing w/ supervision  Long term goal 4: Toileting w/ supervision  Long term goal 5: UB dressing w/ supervision  Patient Goals   Patient goals :  To go home       Therapy Time   Individual Concurrent Group Co-treatment   Time In       0830   Time Out       0930   Minutes       60   Timed Code Treatment Minutes: 52 Amalia Hardin [employer], MEREDITHR/L -- [id number]

## 2021-07-03 NOTE — FLOWSHEET NOTE
Large bruise noted to underside of patient's right arm, can feel a pea size knot to the middle of the bruise, Patient denies any pain, bruise yellowish green in color

## 2021-07-04 PROCEDURE — 6370000000 HC RX 637 (ALT 250 FOR IP): Performed by: PHYSICAL MEDICINE & REHABILITATION

## 2021-07-04 PROCEDURE — 6360000002 HC RX W HCPCS: Performed by: PHYSICAL MEDICINE & REHABILITATION

## 2021-07-04 PROCEDURE — 1280000000 HC REHAB R&B

## 2021-07-04 PROCEDURE — 6370000000 HC RX 637 (ALT 250 FOR IP): Performed by: NURSE PRACTITIONER

## 2021-07-04 RX ADMIN — ATENOLOL 25 MG: 50 TABLET ORAL at 08:36

## 2021-07-04 RX ADMIN — LEVOFLOXACIN 500 MG: 500 TABLET, FILM COATED ORAL at 08:37

## 2021-07-04 RX ADMIN — ESCITALOPRAM OXALATE 5 MG: 10 TABLET ORAL at 08:37

## 2021-07-04 RX ADMIN — ASPIRIN 81 MG: 81 TABLET, CHEWABLE ORAL at 08:37

## 2021-07-04 RX ADMIN — ENOXAPARIN SODIUM 40 MG: 40 INJECTION SUBCUTANEOUS at 08:36

## 2021-07-04 RX ADMIN — FINASTERIDE 5 MG: 5 TABLET, FILM COATED ORAL at 08:36

## 2021-07-04 RX ADMIN — TAMSULOSIN HYDROCHLORIDE 0.4 MG: 0.4 CAPSULE ORAL at 08:37

## 2021-07-04 RX ADMIN — ATORVASTATIN CALCIUM 40 MG: 40 TABLET, FILM COATED ORAL at 20:48

## 2021-07-04 RX ADMIN — LISINOPRIL 5 MG: 5 TABLET ORAL at 08:36

## 2021-07-04 RX ADMIN — CLOPIDOGREL BISULFATE 75 MG: 75 TABLET ORAL at 08:37

## 2021-07-04 ASSESSMENT — PAIN SCALES - GENERAL
PAINLEVEL_OUTOF10: 0

## 2021-07-04 NOTE — PLAN OF CARE
Problem: Falls - Risk of:  Goal: Will remain free from falls  Description: Will remain free from falls  7/4/2021 1148 by Ariel Garcia RN  Outcome: Ongoing  7/3/2021 2355 by Melissa Morrell RN  Outcome: Ongoing  Goal: Absence of physical injury  Description: Absence of physical injury  7/4/2021 1148 by Ariel Garcia RN  Outcome: Ongoing  7/3/2021 2355 by Melissa Morrell RN  Outcome: Ongoing

## 2021-07-05 ENCOUNTER — APPOINTMENT (OUTPATIENT)
Dept: GENERAL RADIOLOGY | Age: 86
DRG: 056 | End: 2021-07-05
Attending: PHYSICAL MEDICINE & REHABILITATION
Payer: MEDICARE

## 2021-07-05 LAB
ANION GAP SERPL CALCULATED.3IONS-SCNC: 7 MMOL/L (ref 3–16)
BUN BLDV-MCNC: 21 MG/DL (ref 7–20)
CALCIUM SERPL-MCNC: 9 MG/DL (ref 8.3–10.6)
CHLORIDE BLD-SCNC: 107 MMOL/L (ref 99–110)
CO2: 26 MMOL/L (ref 21–32)
CREAT SERPL-MCNC: 0.9 MG/DL (ref 0.8–1.3)
GFR AFRICAN AMERICAN: >60
GFR NON-AFRICAN AMERICAN: >60
GLUCOSE BLD-MCNC: 97 MG/DL (ref 70–99)
HCT VFR BLD CALC: 38.6 % (ref 40.5–52.5)
HEMOGLOBIN: 12.9 G/DL (ref 13.5–17.5)
MCH RBC QN AUTO: 29.6 PG (ref 26–34)
MCHC RBC AUTO-ENTMCNC: 33.5 G/DL (ref 31–36)
MCV RBC AUTO: 88.5 FL (ref 80–100)
PDW BLD-RTO: 14.5 % (ref 12.4–15.4)
PLATELET # BLD: 341 K/UL (ref 135–450)
PMV BLD AUTO: 8.2 FL (ref 5–10.5)
POTASSIUM SERPL-SCNC: 4.4 MMOL/L (ref 3.5–5.1)
RBC # BLD: 4.36 M/UL (ref 4.2–5.9)
SODIUM BLD-SCNC: 140 MMOL/L (ref 136–145)
WBC # BLD: 8.9 K/UL (ref 4–11)

## 2021-07-05 PROCEDURE — 6370000000 HC RX 637 (ALT 250 FOR IP): Performed by: PHYSICAL MEDICINE & REHABILITATION

## 2021-07-05 PROCEDURE — 6360000002 HC RX W HCPCS: Performed by: PHYSICAL MEDICINE & REHABILITATION

## 2021-07-05 PROCEDURE — 85027 COMPLETE CBC AUTOMATED: CPT

## 2021-07-05 PROCEDURE — 1280000000 HC REHAB R&B

## 2021-07-05 PROCEDURE — 80048 BASIC METABOLIC PNL TOTAL CA: CPT

## 2021-07-05 PROCEDURE — 36415 COLL VENOUS BLD VENIPUNCTURE: CPT

## 2021-07-05 PROCEDURE — 6370000000 HC RX 637 (ALT 250 FOR IP): Performed by: NURSE PRACTITIONER

## 2021-07-05 PROCEDURE — 71045 X-RAY EXAM CHEST 1 VIEW: CPT

## 2021-07-05 RX ADMIN — FINASTERIDE 5 MG: 5 TABLET, FILM COATED ORAL at 09:02

## 2021-07-05 RX ADMIN — TAMSULOSIN HYDROCHLORIDE 0.4 MG: 0.4 CAPSULE ORAL at 09:02

## 2021-07-05 RX ADMIN — ASPIRIN 81 MG: 81 TABLET, CHEWABLE ORAL at 09:02

## 2021-07-05 RX ADMIN — ENOXAPARIN SODIUM 40 MG: 40 INJECTION SUBCUTANEOUS at 09:01

## 2021-07-05 RX ADMIN — ATORVASTATIN CALCIUM 40 MG: 40 TABLET, FILM COATED ORAL at 20:08

## 2021-07-05 RX ADMIN — ESCITALOPRAM OXALATE 5 MG: 10 TABLET ORAL at 09:02

## 2021-07-05 RX ADMIN — CLOPIDOGREL BISULFATE 75 MG: 75 TABLET ORAL at 09:02

## 2021-07-05 RX ADMIN — ATENOLOL 25 MG: 50 TABLET ORAL at 09:02

## 2021-07-05 RX ADMIN — LISINOPRIL 5 MG: 5 TABLET ORAL at 09:02

## 2021-07-05 ASSESSMENT — PAIN SCALES - GENERAL: PAINLEVEL_OUTOF10: 0

## 2021-07-05 NOTE — PROGRESS NOTES
Juarez Constable  7/5/2021  [id number]    Chief Complaint: Acute embolic stroke ([facility] [address]    Subjective:   No significant weekend events. No current complaints. Labs reviewed. Still awaiting cardiac monitor. ROS: No CP, SOB, dyspnea    Objective:  Patient Vitals for the past 24 hrs:   BP Temp Temp src Pulse Resp SpO2   07/04/21 2045 135/79 98.7 °F (37.1 °C) Oral 60 17 94 %     Gen: No distress, pleasant. Resting in bed  HEENT: Normocephalic, atraumatic. CV: Regular rate and rhythm. No MRG   Resp: No respiratory distress. CTAB. Abd: Soft, nontender nondistended  Ext: No edema. Neuro: Alert, poor initiation, appropriately interactive. Left neglect    Laboratory data: Available via EMR. Therapy progress:  PT  Position Activity Restriction  Other position/activity restrictions: 81year old male with a history of CAD, dementia, PVD who was admitted to [facility] on 6/19 with left-sided weakness. He was recently evaluated at this facility for an episode of syncope. Work-up revealed a right watershed ischemic stroke in the right frontal and parietal regions. He was initiated on Plavix in addition to aspirin and statin. A1c 5.3. LDL 88. Echo was unremarkable for an embolic source. He was evaluated by therapy and suggested to continue in an inpatient setting prior to returning home. He has no current complaints today. He is currently sitting up with therapy and having intermittent episodes of increased respirations with decreased heart rate. Objective     Sit to Stand: 2 Person Assistance, Dependent/Total (mod-maxA of 2)  Stand to sit: 2 Person Assistance, Dependent/Total (maxA of 2)  Bed to Chair: Dependent/Total, 2 Person Assistance (maxA of 2)     OT  PT Equipment Recommendations  Equipment Needed: Yes  Other: TBD with progress.   Currently, pt will require a sukhwinder lift and manual w/c  Toilet - Technique: Stand pivot  Equipment Used: Extra wide bedside commode  Toilet Transfers Comments: Max A

## 2021-07-05 NOTE — PLAN OF CARE
Education Provided as followed:  \"Family/Patient made aware of the tailored interventions falls plan using the TIPS TOOL.    Problem: Falls - Risk of:  Goal: Will remain free from falls  Description: Will remain free from falls  7/4/2021 2010 by Manolo Huston RN  Outcome: Ongoing  7/4/2021 1148 by Sonia Frost RN  Outcome: Ongoing  Goal: Absence of physical injury  Description: Absence of physical injury  7/4/2021 2010 by Manolo Huston RN  Outcome: Ongoing  7/4/2021 1148 by Sonia Frost RN  Outcome: Ongoing     Problem: Skin Integrity:  Goal: Will show no infection signs and symptoms  Description: Will show no infection signs and symptoms  7/4/2021 2010 by Manolo Huston RN  Outcome: Ongoing  7/4/2021 1148 by Sonia Frost RN  Outcome: Ongoing  Goal: Absence of new skin breakdown  Description: Absence of new skin breakdown  7/4/2021 2010 by Manolo Huston RN  Outcome: Ongoing  7/4/2021 1148 by Sonia Frost RN  Outcome: Ongoing     Problem: Confusion - Acute:  Goal: Absence of continued neurological deterioration signs and symptoms  Description: Absence of continued neurological deterioration signs and symptoms  7/4/2021 2010 by Manolo Huston RN  Outcome: Ongoing  7/4/2021 1148 by Sonia Frost RN  Outcome: Ongoing  Goal: Mental status will be restored to baseline  Description: Mental status will be restored to baseline  7/4/2021 2010 by Manolo Huston RN  Outcome: Ongoing  7/4/2021 1148 by Sonia Frost RN  Outcome: Ongoing     Problem: Discharge Planning:  Goal: Ability to perform activities of daily living will improve  Description: Ability to perform activities of daily living will improve  7/4/2021 2010 by Manolo Huston RN  Outcome: Ongoing  7/4/2021 1148 by Sonia Frost RN  Outcome: Ongoing  Goal: Participates in care planning  Description: Participates in care planning  7/4/2021 2010 by Manolo Huston RN  Outcome: Ongoing  7/4/2021 1148 by Sonia Frost RN  Outcome: Ongoing Problem: Injury - Risk of, Physical Injury:  Goal: Will remain free from falls  Description: Will remain free from falls  7/4/2021 2010 by Suleiman Gary RN  Outcome: Ongoing  7/4/2021 1148 by Judy Ruiz RN  Outcome: Ongoing  Goal: Absence of physical injury  Description: Absence of physical injury  7/4/2021 2010 by Suleiman Gary RN  Outcome: Ongoing  7/4/2021 1148 by Judy Ruiz RN  Outcome: Ongoing     Problem: Mood - Altered:  Goal: Mood stable  Description: Mood stable  7/4/2021 2010 by Suleiman Gary RN  Outcome: Ongoing  7/4/2021 1148 by Judy Ruiz RN  Outcome: Ongoing  Goal: Absence of abusive behavior  Description: Absence of abusive behavior  7/4/2021 2010 by Suleiman Gary RN  Outcome: Ongoing  7/4/2021 1148 by Judy Ruiz RN  Outcome: Ongoing  Goal: Verbalizations of feeling emotionally comfortable while being cared for will increase  Description: Verbalizations of feeling emotionally comfortable while being cared for will increase  7/4/2021 2010 by Suleiman Gary RN  Outcome: Ongoing  7/4/2021 1148 by Judy Ruiz RN  Outcome: Ongoing     Problem: Psychomotor Activity - Altered:  Goal: Absence of psychomotor disturbance signs and symptoms  Description: Absence of psychomotor disturbance signs and symptoms  7/4/2021 2010 by Suleiman Gary RN  Outcome: Ongoing  7/4/2021 1148 by Judy Ruiz RN  Outcome: Ongoing     Problem: Sensory Perception - Impaired:  Goal: Demonstrations of improved sensory functioning will increase  Description: Demonstrations of improved sensory functioning will increase  7/4/2021 2010 by Suleiman Gary RN  Outcome: Ongoing  7/4/2021 1148 by Judy Ruiz RN  Outcome: Ongoing  Goal: Decrease in sensory misperception frequency  Description: Decrease in sensory misperception frequency  7/4/2021 2010 by Suleiman Gary RN  Outcome: Ongoing  7/4/2021 1148 by Judy Ruiz RN  Outcome: Ongoing  Goal: Able to refrain from responding to false sensory perceptions  Description: Able to refrain from responding to false sensory perceptions  7/4/2021 2010 by Angel Scott RN  Outcome: Ongoing  7/4/2021 1148 by Gisele Esqueda RN  Outcome: Ongoing  Goal: Demonstrates accurate environmental perceptions  Description: Demonstrates accurate environmental perceptions  7/4/2021 2010 by Angel Scott RN  Outcome: Ongoing  7/4/2021 1148 by Gisele Esqueda RN  Outcome: Ongoing  Goal: Able to distinguish between reality-based and nonreality-based thinking  Description: Able to distinguish between reality-based and nonreality-based thinking  7/4/2021 2010 by Angel Scott RN  Outcome: Ongoing  7/4/2021 1148 by Gisele Esqueda RN  Outcome: Ongoing  Goal: Able to interrupt nonreality-based thinking  Description: Able to interrupt nonreality-based thinking  7/4/2021 2010 by Angel Scott RN  Outcome: Ongoing  7/4/2021 1148 by Gisele Esqueda RN  Outcome: Ongoing     Problem: Sleep Pattern Disturbance:  Goal: Appears well-rested  Description: Appears well-rested  7/4/2021 2010 by Angel Scott RN  Outcome: Ongoing  7/4/2021 1148 by Gisele Esqueda RN  Outcome: Ongoing     Problem: IP BLADDER/VOIDING  Goal: LTG - Patient will utilize adaptive techniques/equipment to complete bladder elimination  7/4/2021 2010 by Angel Scott RN  Outcome: Ongoing  7/4/2021 1148 by Gisele Esqueda RN  Outcome: Ongoing  Goal: STG - Patient demonstrates no accidents  7/4/2021 2010 by Angel Scott RN  Outcome: Ongoing  7/4/2021 1148 by Gisele Esqueda RN  Outcome: Ongoing  Goal: STG - Patient will state signs and symptoms of UTI  7/4/2021 2010 by Angel Scott RN  Outcome: Ongoing  7/4/2021 1148 by Gisele Esqueda RN  Outcome: Ongoing  Goal: STG - patient will be able to empty bladder  7/4/2021 2010 by Angel Scott RN  Outcome: Ongoing  7/4/2021 1148 by Gisele Esqueda RN  Outcome: Ongoing     Problem: IP BOWEL ELIMINATION  Goal: LTG - patient will have regular and routine bowel evacuation  7/4/2021 2010 by Elder Root RN  Outcome: Ongoing  7/4/2021 1148 by Artem Hartman RN  Outcome: Ongoing     Problem: Neurological  Goal: Maximum potential motor/sensory/cognitive function  7/4/2021 2010 by Elder Root RN  Outcome: Ongoing  7/4/2021 1148 by Artem Hartman RN  Outcome: Ongoing

## 2021-07-06 PROCEDURE — 6360000002 HC RX W HCPCS: Performed by: PHYSICAL MEDICINE & REHABILITATION

## 2021-07-06 PROCEDURE — 97129 THER IVNTJ 1ST 15 MIN: CPT

## 2021-07-06 PROCEDURE — 1280000000 HC REHAB R&B

## 2021-07-06 PROCEDURE — 97130 THER IVNTJ EA ADDL 15 MIN: CPT

## 2021-07-06 PROCEDURE — 97530 THERAPEUTIC ACTIVITIES: CPT

## 2021-07-06 PROCEDURE — 6370000000 HC RX 637 (ALT 250 FOR IP): Performed by: NURSE PRACTITIONER

## 2021-07-06 PROCEDURE — 6370000000 HC RX 637 (ALT 250 FOR IP): Performed by: PHYSICAL MEDICINE & REHABILITATION

## 2021-07-06 PROCEDURE — 97535 SELF CARE MNGMENT TRAINING: CPT

## 2021-07-06 PROCEDURE — 92526 ORAL FUNCTION THERAPY: CPT

## 2021-07-06 RX ORDER — LISINOPRIL 10 MG/1
10 TABLET ORAL DAILY
Status: DISCONTINUED | OUTPATIENT
Start: 2021-07-06 | End: 2021-07-07

## 2021-07-06 RX ADMIN — ESCITALOPRAM OXALATE 5 MG: 10 TABLET ORAL at 08:40

## 2021-07-06 RX ADMIN — CLOPIDOGREL BISULFATE 75 MG: 75 TABLET ORAL at 08:40

## 2021-07-06 RX ADMIN — TAMSULOSIN HYDROCHLORIDE 0.4 MG: 0.4 CAPSULE ORAL at 08:40

## 2021-07-06 RX ADMIN — LISINOPRIL 10 MG: 10 TABLET ORAL at 08:40

## 2021-07-06 RX ADMIN — ATENOLOL 25 MG: 50 TABLET ORAL at 08:40

## 2021-07-06 RX ADMIN — ENOXAPARIN SODIUM 40 MG: 40 INJECTION SUBCUTANEOUS at 08:39

## 2021-07-06 RX ADMIN — ASPIRIN 81 MG: 81 TABLET, CHEWABLE ORAL at 08:39

## 2021-07-06 RX ADMIN — FINASTERIDE 5 MG: 5 TABLET, FILM COATED ORAL at 08:40

## 2021-07-06 RX ADMIN — ATORVASTATIN CALCIUM 40 MG: 40 TABLET, FILM COATED ORAL at 20:11

## 2021-07-06 NOTE — PROGRESS NOTES
Occupational Therapy  Facility/Department: [facility] ACUTE REHAB UNIT  Daily Treatment Note  NAME: Bennett Nelson  : 1935  MRN: [id number]    Date of Service: 2021    Discharge Recommendations:  Home with Home health OT, S Level 3, Home with nursing aide, 24 hour supervision or assist  OT Equipment Recommendations  Equipment Needed: Yes  Other: [employer] D[employer] lift, hospital bed, BSC if family prefers over bedpan    Assessment   Performance deficits / Impairments: Decreased functional mobility ; Decreased endurance;Decreased coordination;Decreased posture;Decreased ADL status; Decreased ROM; Decreased strength;Decreased balance;Decreased vision/visual deficit; Decreased high-level IADLs;Decreased safe awareness;Decreased cognition;Decreased fine motor control  Assessment: 81 y.o. male presents w/ the above deficits which are impacting his occupational performance. Pt would benefit from continued therapy during inpatient stay in order to maximize safety and independence upon discharge. Treatment Diagnosis: Multiple performance deficits s/p CVA  OT Education: OT Role;Plan of Care;ADL Adaptive Strategies;Transfer Training;Orientation  Patient Education: Pt is not an independent learner  Barriers to Learning: Cognition  REQUIRES OT FOLLOW UP: Yes  Activity Tolerance  Activity Tolerance: Patient limited by fatigue;Treatment limited secondary to decreased cognition  Activity Tolerance: Pt continues to demo L lateral lean and pushing behavior which as limited pt's ability to fully participate in therapy; more lethargic this date requiring frequent cues for alertness  Safety Devices  Safety Devices in place: Yes  Type of devices: All fall risk precautions in place;Call light within reach; Chair alarm in place;Gait belt;Patient at risk for falls; Left in chair;Nurse notified         Patient Diagnosis(es):  CVA     has a past medical history of Dementia ([facility] [address] HTN (hypertension), Hyperlipidemia, and PONV (postoperative nausea and vomiting). has a past surgical history that includes Knee arthroscopy; Colonoscopy; eye surgery (Right, 11/2014); Cystoscopy (04/04/2018); and TURP (04/26/2018). Restrictions  Restrictions/Precautions  Restrictions/Precautions: Fall Risk, Modified Diet (nectar thick liquids)  Required Braces or Orthoses?: No  Position Activity Restriction  Other position/activity restrictions: 81year old male with a history of CAD, dementia, PVD who was admitted to [facility] on 6/19 with left-sided weakness. He was recently evaluated at this facility for an episode of syncope. Work-up revealed a right watershed ischemic stroke in the right frontal and parietal regions. He was initiated on Plavix in addition to aspirin and statin. A1c 5.3. LDL 88. Echo was unremarkable for an embolic source. He was evaluated by therapy and suggested to continue in an inpatient setting prior to returning home. He has no current complaints today. He is currently sitting up with therapy and having intermittent episodes of increased respirations with decreased heart rate. Subjective   General  Chart Reviewed: Yes  Patient assessed for rehabilitation services?: Yes  Response to previous treatment: Patient with no complaints from previous session  Family / Caregiver Present: No  Diagnosis: CVA  Subjective  Subjective: Pt was supine in bed upon arrival to therapy session. Pt was confused but pleasant and agreeable to therapy session. Objective    ADL  Grooming: Setup;Maximum assistance (To shave face)  UE Bathing: Moderate assistance  LE Bathing: Setup;Maximum assistance  UE Dressing: Setup;Maximum assistance  LE Dressing: Dependent/Total  Toileting: Dependent/Total  Additional Comments: Pt completed UB/LB sponge bathing while supported in long sitting in bed. Second person assist required throughout for sitting balance. Constant cues to maintain attention to task, initiate and terminate task activity.  Completed L/R rolling while supine to complete toileting/pericare, LB dressing. Pt hoyered to w/c. Balance  Sitting Balance: Dependent/Total  Standing Balance: Dependent/Total  Standing Balance  Time: ~8 min in standing frame  Activity: Pt completed ROM arc w/ RUE while in standing frame. Pt required constant cues to maintain attention to task, visually scan L/R during task completion. Transfers  Sit to stand: Dependent/Total  Stand to sit: Dependent/Total  Transfer Comments: Dependent x3 to achieve full stance in standing frame            Plan   Plan  Times per week: 60 min; 5-7x  Times per day: Daily  Current Treatment Recommendations: Strengthening, Patient/Caregiver Education & Training, Home Management Training, Equipment Evaluation, Education, & procurement, Balance Training, Neuromuscular Re-education, Functional Mobility Training, Endurance Training, Safety Education & Training, Self-Care / ADL       Goals  Short term goals  Time Frame for Short term goals: 1-2 weeks  Short term goal 1: Functional transfer for ADL completion w/ assist x1  Short term goal 2: Bathing w/ assist x1  Short term goal 3: LB dressing w/ assist x1  Short term goal 4: Toileting w/ assist x1  Short term goal 5: UB dressing w/ Min A  Long term goals  Time Frame for Long term goals : 3-4 weeks  Long term goal 1: Functional transfer for ADL completion w/ supervision  Long term goal 2: Bathing w/ supervision  Long term goal 3: LB dressing w/ supervision  Long term goal 4: Toileting w/ supervision  Long term goal 5: UB dressing w/ supervision  Patient Goals   Patient goals :  To go home       Therapy Time   Individual Concurrent Group Co-treatment   Time In       0915   Time Out       1015   Minutes       60        Timed Code Treatment Minutes:  60 Minutes    Total Treatment Minutes:  60 minutes       MEREDITH Bustillos OTR/L, [address] [facility] #[id number]

## 2021-07-06 NOTE — PROGRESS NOTES
[facility]  Inpatient Rehabilitation  Weekly Team Conference Note    Patient Name: Hakeem Burnette        MRN: [id number]    : 1935  (81 y.o.)  Gender: male   Referring Practitioner: Dr Jorge A Ramirez  Diagnosis: CVA    The team conference for this patient was held on 21 at 11:00am by:  Christine Lauren MD    CASE MANAGEMENT:  Assessment:     PSYCHOLOGY:  Assessment:     PHYSICAL THERAPY:    Bed Mobility:        Transfers:  Sit to Stand: 2 Person Assistance, Dependent/Total (mod-maxA of 2)  Stand to sit: 2 Person Assistance, Dependent/Total (maxA of 2)  Bed to Chair: Dependent/Total, 2 Person Assistance (maxA of 2)              QM:  Roll Left and Right  Assistance Needed: Dependent  Comment: pt able to help roll to the L  CARE Score: 1  Discharge Goal: Supervision or touching assistance  Sit to Lying  Assistance Needed: Substantial/maximal assistance  CARE Score: 2  Discharge Goal: Supervision or touching assistance  Lying to Sitting on Side of Bed  Assistance Needed: Dependent  CARE Score: 1  Discharge Goal: Supervision or touching assistance  Sit to Stand  Assistance Needed: Dependent  CARE Score: 1  Discharge Goal: Supervision or touching assistance  Chair/Bed-to-Chair Transfer  Assistance Needed: Dependent  CARE Score: 1  Discharge Goal: Supervision or touching assistance  Car Transfer  Reason if not Attempted: Not attempted due to medical condition or safety concerns  CARE Score: 88  Discharge Goal: Supervision or touching assistance  Walk 10 Feet  Reason if not Attempted: Not attempted due to medical condition or safety concerns  CARE Score: 88  Discharge Goal: Supervision or touching assistance  Walk 50 Feet with Two Turns  Reason if not Attempted: Not attempted due to medical condition or safety concerns  CARE Score: 88  Discharge Goal: Supervision or touching assistance  Walk 150 Feet  Reason if not Attempted: Not attempted due to medical condition or safety concerns  CARE Score: 88  Discharge Goal: Supervision or touching assistance  Walking 10 Feet on Uneven Surfaces  Reason if not Attempted: Not attempted due to medical condition or safety concerns  CARE Score: 88  Discharge Goal: Supervision or touching assistance  1 Step (Curb)  Reason if not Attempted: Not attempted due to medical condition or safety concerns  CARE Score: 88  Discharge Goal: Supervision or touching assistance  4 Steps  Reason if not Attempted: Not attempted due to medical condition or safety concerns  CARE Score: 88  Discharge Goal: Supervision or touching assistance  12 Steps  Reason if not Attempted: Not attempted due to medical condition or safety concerns  CARE Score: 88  Discharge Goal: Supervision or touching assistance  Picking Up Object  Reason if not Attempted: Not attempted due to medical condition or safety concerns  CARE Score: 88  Discharge Goal: Supervision or touching assistance  Wheelchair Ability  Uses a Wheelchair and/or Scooter?: No    SPEECH THERAPY:    Diet Level:ADULT DIET; Dysphagia - Soft and Bite Sized; Mildly Thick (Nectar)    Assessment: Pt's cognition remains severely impaired with limited initiation and carry over of new information. He responds well to visual aids for orientation. Dysphagia has become more evident since initial evaluation due to inconsistency of breath/swallow coordination impacting airway protection and increasing risk for aspiration.      OCCUPATIONAL THERAPY:    ADL:   ADL  Grooming: Setup, Minimal assistance (to wash face)  UE Bathing: Maximum assistance (pt completed UB bathing supine in bed with cloth provided; cues throughout for initiation, sequencing, and appropriate use of cloth)  LE Bathing: Dependent/Total (bathing completed supine in bed with total assist)  UE Dressing: Maximum assistance (maximum assistance cues provided for facilitation of donning shirt sitting up in bed)  LE Dressing: Dependent/Total  Toileting: Dependent/Total  Additional Comments: Pt incontinent of stool while working w/ therapy. Pt with bed mobility rolling to L/R side for completion of pericare with total assistance. Toilet Transfers: Toilet Transfers  Toilet - Technique: Stand pivot  Equipment Used: Extra wide bedside commode  Toilet Transfer: Dependent/Total  Toilet Transfers Comments: Max A x2    Tub/ShowerTransfers:          QM:  Eating  Assistance Needed: Setup or clean-up assistance  Comment: finely cut up turkey sandwich  CARE Score: 5  Discharge Goal: Independent  Oral Hygiene  Assistance Needed: Partial/moderate assistance  Comment: cues & encouragement w/ mouth swab  CARE Score: 3  Discharge Goal: Independent  Toileting Hygiene  Assistance Needed: Dependent  CARE Score: 1  Discharge Goal: Supervision or touching assistance  Toilet Transfer  Assistance Needed: Dependent  CARE Score: 1  Shower/Bathe Self  Assistance Needed: Substantial/maximal assistance  CARE Score: 2  Discharge Goal: Supervision or touching assistance  Upper Body Dressing  Assistance Needed: Substantial/maximal assistance  CARE Score: 2  Discharge Goal: Independent  Lower Body Dressing  Assistance Needed: Dependent  CARE Score: 1  Discharge Goal: Set-up or clean-up assistance  Putting On/Taking Off Footwear  Assistance Needed: Dependent  CARE Score: 1  Discharge Goal: Set-up or clean-up assistance    NUTRITION:  Weight: 226 lb 8 oz (102.7 kg) / Body mass index is 29.08 kg/m². Diet Order: Dysphagia soft & bite sized, Mildly (Nectar) thick liquids    Supplements:NA    Nutrition status is stable AEB po intake at least 50% of meals on Dysphagia soft & bite sized diet. RN reports pt needs reminders to eat d/t alzheimers, but has a good appetite. Please see nutrition note for details. NURSING:     Risk for Readmission: 14%    [place] Fall Risk Score: 60  Wounds/Incisions/Ulcers: None  Medication Review: Meds reviewed with patient daily.   Pain: Controlled with medication as needed  Consultations/Labs/X-rays: Labs : BMP & CBC every Monday & Thursday    Patient/Family Education provided by team:    Discharge Plan   Estimated Length of Stay:10 days  Destination: home health  Pass:No  Services at Discharge: Other [place] PT S3, [place] OT S3   Equipment at Discharge: custom w/c, sukhwinder lift, ramp  Factors facilitating achievement of predicted outcomes: cooperative  Barriers to the achievement of predicted outcomes: cognition  Patient Goals:pt unable to assess, To go home    Plan of Care  Interdisciplinary Individualized Plan of Care Review:    Continue Current Plan of Care:  Yes  Modifications:_____________________________    Rehab Team Members in attendance for Team Conference:  WING Cadet, WING Lomax, RD, LD    [address] [facility], OTR/L    Calvin Smyth, PT, DPT    Sadie Quesada M.A., AMEENA VerdugoN, RN, [address] PT, DPT,     I approve the established interdisciplinary plan of care as documented within the medical record of Baron Russo.     Dell Munroe MD  Electronically signed by Dell Munroe MD on 7/6/2021 at 11:51 AM

## 2021-07-06 NOTE — CARE COORDINATION
Team conference held today. Spoke with patient and his daughter to discuss progress with therapy, barriers to discharge, and plans to return home. Estimated discharge date set for 7/16/2021. Patient anticipates discharging to home with [address] and needed supports. Will continue to follow for support and discharge planning.

## 2021-07-06 NOTE — PROGRESS NOTES
Physical Therapy  Facility/Department: [facility] ACUTE REHAB UNIT  Daily Treatment Note  NAME: Liana Postal  : 1935  MRN: [id number]    Date of Service: 2021    Discharge Recommendations:  24 hour supervision or assist, Home with Home health PT, S Level 3   PT Equipment Recommendations  Equipment Needed: Yes  Other: TBD with progress. Currently, pt will require a sukhwinder lift and manual w/c    Assessment   Body structures, Functions, Activity limitations: Decreased functional mobility ; Decreased balance;Decreased coordination;Decreased endurance;Decreased strength;Decreased posture;Decreased ADL status; Decreased safe awareness;Decreased ROM  Assessment: Pt continues to require extensive assist of 2 for all functional mobility and ADL completion at this time with ongoing extensive (L) lateral lean + pushing in both seated and standing position. Pt remains non ambulatory secondary to poor standing and sitting balance. Treatment Diagnosis: decreased balance, coordination, and functional mobiilty  Prognosis: Fair  PT Education: Goals;PT Role;Plan of Care;Transfer Training;General Safety; Functional Mobility Training  Patient Education: Pt will require reinforcement secondary to cognitive deficits. Barriers to Learning: cognition  REQUIRES PT FOLLOW UP: Yes  Activity Tolerance  Activity Tolerance: Patient limited by endurance; Patient limited by cognitive status     Patient Diagnosis(es): CVA. has a past medical history of Dementia ([address] HTN (hypertension), Hyperlipidemia, and PONV (postoperative nausea and vomiting). has a past surgical history that includes Knee arthroscopy; Colonoscopy; eye surgery (Right, 2014); Cystoscopy (2018); and TURP (2018).     Restrictions  Restrictions/Precautions  Restrictions/Precautions: Fall Risk, Modified Diet (nectar thick liquids)  Required Braces or Orthoses?: No  Position Activity Restriction  Other position/activity restrictions: 81year old male with a history of CAD, dementia, PVD who was admitted to [facility] on 6/19 with left-sided weakness. He was recently evaluated at this facility for an episode of syncope. Work-up revealed a right watershed ischemic stroke in the right frontal and parietal regions. He was initiated on Plavix in addition to aspirin and statin. A1c 5.3. LDL 88. Echo was unremarkable for an embolic source. He was evaluated by therapy and suggested to continue in an inpatient setting prior to returning home. He has no current complaints today. He is currently sitting up with therapy and having intermittent episodes of increased respirations with decreased heart rate. Subjective   General  Chart Reviewed: Yes  Additional Pertinent Hx: dementia, HTN, DM  Response To Previous Treatment: Patient with no complaints from previous session. Family / Caregiver Present: No  Subjective  Subjective: Pt in bed on arrival.  Agreeable to therapy services. Pain Screening  Patient Currently in Pain: No  Vital Signs  Patient Currently in Pain: No       Orientation     Cognition      Objective   Bed mobility  Rolling to Left: Maximum assistance  Rolling to Right: Maximum assistance  Supine to Sit: Dependent/Total;2 Person assistance (totalA of 2)  Transfers  Sit to Stand: 2 Person Assistance;Dependent/Total (totalA of 2)  Stand to sit: 2 Person Assistance;Dependent/Total (totalA of 2)  Bed to Chair: Dependent/Total;2 Person Assistance (totalA of 2 with maxi hubert)  Lateral Transfers: 2 Person Assistance;Dependent/Total (maxi-hubert)  Ambulation  Ambulation?: No  Stairs/Curb  Stairs?: No     Balance  Posture: Poor  Sitting - Static: Poor  Sitting - Dynamic: Poor;-  Standing - Static: Poor;-  Comments: consistent (L) lateral lean in all positions. Patient able to partially self correct in seated position. Requires max (A) to obtain and maintain midline position.             Comment: Performed ADL and pericare in bed with totalA as documented above.  Performed sukhwinder lift transfer to w/c with totalA of 2. Performed standing in standing frame X ~ 10 minutes with maxA to obtain and maintain midline position due to L lean with activity with OT. Pt returned to room and remained in w/c with alarm on and all needs in reach.               G-Code     OutComes Score                                                     AM-PAC Score             Goals  Short term goals  Time Frame for Short term goals: 1-2 weeks  Short term goal 1: Pt will perform all bed mobility with maxA of 1  Short term goal 2: Pt will perform SPT with maxA of 1  Short term goal 3: Pt will ambulate 8' with LRAD with maxA of 1  Short term goal 4: Pt will negotiate 1 step with LRAD and maxA of 1  Long term goals  Time Frame for Long term goals : 2-3 weeks  Long term goal 1: Pt will perform all bed mobility with CGA  Long term goal 2: Pt will perform all transfers with CGA  Long term goal 3: Pt will ambulate 48' with LRAD CGA  Long term goal 4: Pt will negotiate 12 steps with kapil  Patient Goals   Patient goals : pt unable to assess    Plan    Plan  Times per week: 60 minutes a day 5 days a week  Current Treatment Recommendations: Strengthening, Transfer Training, Endurance Training, Neuromuscular Re-education, Patient/Caregiver Education & Training, ROM, Wheelchair Mobility Training, Manual Therapy - Soft Tissue Mobilization, Equipment Evaluation, Education, & procurement, Balance Training, Gait Training, Functional Mobility Training, Stair training, Safety Education & Training, Positioning, ADL/Self-care Training  Safety Devices  Type of devices: Call light within reach, Chair alarm in place, Left in chair, Telesitter in use, Gait belt, All fall risk precautions in place  Restraints  Initially in place: No     Therapy Time   Individual Concurrent Group Co-treatment   Time In       0915   Time Out       1015   Minutes       60        Timed Code Treatment Minutes:  60    Total Treatment Minutes: [address], [place], [place] [address]

## 2021-07-06 NOTE — PROGRESS NOTES
Vincenzo Quest  7/6/2021  [id number]    Chief Complaint: Acute embolic stroke ([facility] [address]    Subjective:   No overnight events. No current complaints. BP above goal. Still awaiting cardiac monitor. ROS: No CP, SOB, dyspnea    Objective:  Patient Vitals for the past 24 hrs:   BP Temp Temp src Pulse Resp SpO2   07/06/21 0840 (!) 176/80   64     07/05/21 2000 (!) 176/76 97.9 °F (36.6 °C) Oral 61 18 93 %   07/05/21 0902 (!) 144/84 97.9 °F (36.6 °C) Oral 65 18      Gen: No distress, pleasant. Resting in bed  HEENT: Normocephalic, atraumatic. CV: Regular rate and rhythm. No MRG   Resp: No respiratory distress. CTAB. Abd: Soft, nontender nondistended  Ext: No edema. Neuro: Alert, poor initiation, appropriately interactive. Left neglect    Laboratory data: Available via EMR. Therapy progress:  PT  Position Activity Restriction  Other position/activity restrictions: 81year old male with a history of CAD, dementia, PVD who was admitted to [facility] on 6/19 with left-sided weakness. He was recently evaluated at this facility for an episode of syncope. Work-up revealed a right watershed ischemic stroke in the right frontal and parietal regions. He was initiated on Plavix in addition to aspirin and statin. A1c 5.3. LDL 88. Echo was unremarkable for an embolic source. He was evaluated by therapy and suggested to continue in an inpatient setting prior to returning home. He has no current complaints today. He is currently sitting up with therapy and having intermittent episodes of increased respirations with decreased heart rate. Objective     Sit to Stand: 2 Person Assistance, Dependent/Total (mod-maxA of 2)  Stand to sit: 2 Person Assistance, Dependent/Total (maxA of 2)  Bed to Chair: Dependent/Total, 2 Person Assistance (maxA of 2)     OT  PT Equipment Recommendations  Equipment Needed: Yes  Other: TBD with progress.   Currently, pt will require a sukhwinder lift and manual w/c  Toilet - Technique:

## 2021-07-06 NOTE — PROGRESS NOTES
ACUTE REHAB UNIT  SPEECH/LANGUAGE PATHOLOGY      [x] Daily  [x] Weekly Care Conference Note  [] Discharge    Patient:Reno Mason     :1935  RDF:[id number]  Room #: BYQ-1087/5882-66   Rehab Dx/Hx: Acute embolic stroke [facility]) [F85.6]  Date of Admit: 2021      Precautions: falls and aspirations  Home situation: Per pt's daughter Bradly Rivera) pt lives with his grandson (22years old). Tomás Gaytan reports plan is for her and her family to move into his place to provide more assistance. ST Dx: Mild-moderate oropharyngeal dysphagia; Severe cognitive linguistic deficits; Moderate dysarthria / dysphonia    Daily Treatment Info:   Date: 2021     Tx session 1 Tx session 2   Pain Denied  Denied    Subjective     Pt sitting upright in bed for session. He was alert and more responsive to language tasks. Pt sitting up in wheelchair after completing PT/OT. Session was limited by fatigue. Required frequent cues to remain awake. Weekly Update: Pt with guarded progress towards goals. Overall cognition is severely impaired and pt has limited carry over of new information. Pt's attention and fatigue are variable between sessions. When alert, his response times improve although accuracy is limited. Orientation and insight remain inconsistent. When fatigued, pt also demonstrates higher risk for aspiration, specifically with thin consistencies due to impulsivity. He is minimally responsive to verbal cues for use of safe swallow strategies. Objective:  Goals     Pt will tolerate recommended diet and advanced trials with use of compensatory swallow strategies provided with < mod cues with no overt clinical s/s of aspiration or noted difficulty.      Pt consumed breakfast meal during session that consisted of soft/regular solids   - pt tolerated all consistencies without overt s/s of aspiration   - slight expiratory wheezing   - O2 saturation >94% throughout   - impulsive with bite size and feeding rate; appears to be his baseline and does not respond to cues to minimize nor slow down     Facilitated trials of thin liquid (cold and hot temperature) with meal   - pt with immediate sensory response to hot temperature on first presentation   - tolerated all remaining presentations (approximately 8 oz) without overt s/sof aspiration   - O2 saturation >95% after thin liquid presentations and meal     No changes in breath support or increased work of breathing during meal.     Pt unable to verbalize differences between liquid consistencies. Unable to verbalize/comprehend rationale for dysphagia or aspiration risk. He appears to be consuming mildly thick liquids with meal (drinks ~4 oz per meal)    He did not initiate request for any thin liquid trials. No documented difficulty with diet tolerance since last session. Facilitated oral care for use of water protocol during session   - pt was able to complete with set up assistance and min verbal cues   - no overt s/s of aspiration   - pt with oral holding of secretions after complete (evident while attempting to complete incentive spirometry)     Facilitated trials of thin water   - pt tolerated without overt s/s of aspiration   - pt with intermittent episodes of increased work of breathing   - SpO2 ranged from 91-97% during session, did not appear to consistently drop immediately after presentations   - pt with instance of oral holding due to poor attention and alertness; required 15 seconds and verbal cues to initiate swallow, suspect oral and pharyngeal pooling. *increased concern for tolerance of thin liquids with episodes of fatigue, shortness of breath, and inattention; continue with water protocol and therapeutic trials of thin liquids with SLP only    Ongoing    Pt will respond and/or initiate action upon verbal prompt within 3 seconds in 4 out of 5 opportunities without repetition.    Responded within 3 seconds to basic (2-3 word utterances) personalized questions on 88% of presentations (7/8)  Pt responded to 43% (3/7) of presentations within 3 seconds; remaining questions required mod-max cues for processing and response times     Attention task (card sorting higher/lower)   - pt responded to 50% of presentations on first attempt   - required mod verbal cues for remaining responses     Ongoing      Pt will use visual aids/ strategies to state orientation to time, place, situation with 100% accuracy across 3 consecutive sessions. Pt required set up assistance for repositioning of digital clock     Oriented to place, month, year. Disoriented to situation (stroke), improved with f/2 choices. Pt minimally responsive to orientation questions   - oriented to day of week with digital clock   - no response to current time, month, and year     Ongoing      Pt will improve oral motor function, articulatory precision and breath support in connected speech via graded tasks to >80% acc with use of speech strategies. Goal not targeted this session. Attempted to complete incentive spirometry for breath coordination   - pt unable to complete despite max verbal and visual cues  - pt holding secretions/ residuals of oral care in his mouth while attempting to complete the task     Ongoing      Pt will improve verbal initiation and thought organization for extended utterances (i.e. related to personal hx/ recall of recent events, etc.) >75% acc. Functional divergent naming (personalized)   - 64% accuracy (9/14 )   - errors related to working memory and attention   - lists compiled of single words (sports, foods, medications, siblings)    Pt responded to personal history questions with 50% accuracy.      Provided basic verbal descriptions of family members in photo album with approximately 40% accuracy   - errors were \"no responses\" due to inattention/fatigue or difficulty with left visual attention     Ongoing      Patient will complete fx problem solving tasks and demonstrate insight into limitations and impact on daily functioning with >75% acc, min cues. Goal not targeted this session. Pt verbalized that he is at \"physical therapy\" to regain strength so he can return home     Ongoing    Other areas targeted:     Education:   Provided education re diet recommendations and aspiration risk. Pt with limited comprehension. Provided education re diet recommendations and aspiration risk. Pt with limited comprehension. Safety Devices: [x] Call light within reach  [x] Chair alarm activated  [x] Bed alarm activated  [x] Other: camera in room [x] Call light within reach  [x] Chair alarm activated  [] Bed alarm activated  [x] Other: camera in room     Assessment:   Speech Therapy Diagnosis  Cognitive Diagnosis: Pt presents with severe cognitive linguistic deficits due to reduced orienation, reduced processing speed, reduced insight into current situation, reduced immediate/ working/ short term memory with fx problem solving difficulty. Per chart review pt with baseline dementia, did not complete IADLs at home prior to CVA but was able to be safely left alone with daily check ins from family. Aphasia Diagnosis: Significantly reduced processing speed/ difficulty following multi-step commands. Unable to discern if due to pt being [place] vs reduced auditory comprehension/ initiation, requires further assessment. Pt c/o word finding diffiuclty, pt with minimal verbal output during evaluation, requires ongoing assessment for goal generation. Speech Diagnosis: Moderate dysarthria/ dysphonia, pt with intermittent periods of tachypnea and reduced breath support impacting intelligibility at the short phrase/ sentence level with reduced articulatory precision, pt with hoarse, gravely wet vocal quality    Current Diet Order: ADULT DIET;  Dysphagia - Soft and Bite Sized; Mildly Thick (Nectar)            Plan:     Frequency:  5days/week   60 minutes/day  Discharge Recommendations: Barriers: Pre-existing cognitive deficit; severity of deficits; Limited initiation  Discharge Recommendations:  [] Home independently  [x] Home with assistance [x]  24 hour supervision  [] SNF [x] Other: TBD  Continued SLP Treatment:  [x] Yes [] No [] TBD based on progress while on ARU [] Vital Stim indicated [] Other:   Estimated discharge date: 7/16/21    Type of Total Treatment Minutes   Session 1   Session 2   Time In 0800 1015   Time Out 0830 1045   Timed Code Minutes  15 15   Individual Treatment Minutes  30 30   Co-Treatment Minutes      Group Treatment Minutes      Concurrent Treatment Minutes        TOTAL DAILY MINUTES:  60    Electronically Signed by     Gus Quiñones M.A.  [facility] ONEYDA [id number]  Speech-Language Pathologist [phone]  7/6/2021 10:15 AM

## 2021-07-06 NOTE — PLAN OF CARE
Problem: Skin Integrity:  Goal: Will show no infection signs and symptoms  Description: Will show no infection signs and symptoms  Outcome: Ongoing  Goal: Absence of new skin breakdown  Description: Absence of new skin breakdown  Outcome: Ongoing   Education Provided as followed:  \"Family/Patient made aware of the tailored interventions falls plan using the TIPS TOOL.

## 2021-07-07 PROCEDURE — 97530 THERAPEUTIC ACTIVITIES: CPT

## 2021-07-07 PROCEDURE — 6360000002 HC RX W HCPCS: Performed by: PHYSICAL MEDICINE & REHABILITATION

## 2021-07-07 PROCEDURE — 1280000000 HC REHAB R&B

## 2021-07-07 PROCEDURE — 97535 SELF CARE MNGMENT TRAINING: CPT

## 2021-07-07 PROCEDURE — 6370000000 HC RX 637 (ALT 250 FOR IP): Performed by: PHYSICAL MEDICINE & REHABILITATION

## 2021-07-07 PROCEDURE — 99223 1ST HOSP IP/OBS HIGH 75: CPT | Performed by: INTERNAL MEDICINE

## 2021-07-07 PROCEDURE — 92526 ORAL FUNCTION THERAPY: CPT

## 2021-07-07 PROCEDURE — 97130 THER IVNTJ EA ADDL 15 MIN: CPT

## 2021-07-07 PROCEDURE — 97129 THER IVNTJ 1ST 15 MIN: CPT

## 2021-07-07 PROCEDURE — 6370000000 HC RX 637 (ALT 250 FOR IP): Performed by: NURSE PRACTITIONER

## 2021-07-07 RX ORDER — LISINOPRIL 10 MG/1
10 TABLET ORAL 2 TIMES DAILY
Status: DISCONTINUED | OUTPATIENT
Start: 2021-07-07 | End: 2021-07-16 | Stop reason: HOSPADM

## 2021-07-07 RX ORDER — LISINOPRIL 10 MG/1
10 TABLET ORAL 2 TIMES DAILY
Status: DISCONTINUED | OUTPATIENT
Start: 2021-07-07 | End: 2021-07-07

## 2021-07-07 RX ADMIN — ENOXAPARIN SODIUM 40 MG: 40 INJECTION SUBCUTANEOUS at 09:33

## 2021-07-07 RX ADMIN — ATENOLOL 25 MG: 50 TABLET ORAL at 09:33

## 2021-07-07 RX ADMIN — CLOPIDOGREL BISULFATE 75 MG: 75 TABLET ORAL at 09:33

## 2021-07-07 RX ADMIN — TRAMADOL HYDROCHLORIDE 50 MG: 50 TABLET, FILM COATED ORAL at 10:29

## 2021-07-07 RX ADMIN — LISINOPRIL 10 MG: 10 TABLET ORAL at 20:07

## 2021-07-07 RX ADMIN — ASPIRIN 81 MG: 81 TABLET, CHEWABLE ORAL at 09:33

## 2021-07-07 RX ADMIN — ESCITALOPRAM OXALATE 5 MG: 10 TABLET ORAL at 09:32

## 2021-07-07 RX ADMIN — FINASTERIDE 5 MG: 5 TABLET, FILM COATED ORAL at 09:32

## 2021-07-07 RX ADMIN — LISINOPRIL 10 MG: 10 TABLET ORAL at 09:41

## 2021-07-07 RX ADMIN — ATORVASTATIN CALCIUM 40 MG: 40 TABLET, FILM COATED ORAL at 20:07

## 2021-07-07 RX ADMIN — TAMSULOSIN HYDROCHLORIDE 0.4 MG: 0.4 CAPSULE ORAL at 09:33

## 2021-07-07 ASSESSMENT — PAIN SCALES - GENERAL
PAINLEVEL_OUTOF10: 0
PAINLEVEL_OUTOF10: 4

## 2021-07-07 NOTE — PROGRESS NOTES
Occupational Therapy  Facility/Department: [facility] ACUTE REHAB UNIT  Daily Treatment Note  NAME: Vincenzo York  : 1935  MRN: [id number]    Date of Service: 2021    Discharge Recommendations:  Home with Home health OT, S Level 3, Home with nursing aide, 24 hour supervision or assist  OT Equipment Recommendations  Equipment Needed: Yes  Other: Nelly Pion lift, hospital bed, BSC if family prefers over bedpan    Assessment   Performance deficits / Impairments: Decreased functional mobility ; Decreased endurance;Decreased coordination;Decreased posture;Decreased ADL status; Decreased ROM; Decreased strength;Decreased balance;Decreased vision/visual deficit; Decreased high-level IADLs;Decreased safe awareness;Decreased cognition;Decreased fine motor control  Assessment: 81 y.o. male presents w/ the above deficits which are impacting his occupational performance. Pt would benefit from continued therapy during inpatient stay in order to maximize safety and independence upon discharge. Treatment Diagnosis: Multiple performance deficits s/p CVA  OT Education: OT Role;Plan of Care;ADL Adaptive Strategies;Transfer Training;Orientation  Patient Education: Pt is not an independent learner  Barriers to Learning: Cognition  REQUIRES OT FOLLOW UP: Yes  Activity Tolerance  Activity Tolerance: Patient Tolerated treatment well;Treatment limited secondary to decreased cognition  Activity Tolerance: Pt continues to demo L lateral lean and pushing behavior which as limited pt's ability to fully participate in therapy; more lethargic this date requiring frequent cues for alertness  Safety Devices  Safety Devices in place: Yes  Type of devices: All fall risk precautions in place;Call light within reach; Chair alarm in place;Gait belt;Patient at risk for falls; Left in chair;Nurse notified         Patient Diagnosis(es):  CVA     has a past medical history of Dementia ([facility] [address] HTN (hypertension), Hyperlipidemia, and PONV (postoperative nausea and vomiting). has a past surgical history that includes Knee arthroscopy; Colonoscopy; eye surgery (Right, 11/2014); Cystoscopy (04/04/2018); and TURP (04/26/2018). Restrictions  Restrictions/Precautions  Restrictions/Precautions: Fall Risk, Modified Diet  Required Braces or Orthoses?: No  Position Activity Restriction  Other position/activity restrictions: 81year old male with a history of CAD, dementia, PVD who was admitted to [facility] on 6/19 with left-sided weakness. He was recently evaluated at this facility for an episode of syncope. Work-up revealed a right watershed ischemic stroke in the right frontal and parietal regions. He was initiated on Plavix in addition to aspirin and statin. A1c 5.3. LDL 88. Echo was unremarkable for an embolic source. He was evaluated by therapy and suggested to continue in an inpatient setting prior to returning home. He has no current complaints today. He is currently sitting up with therapy and having intermittent episodes of increased respirations with decreased heart rate. Subjective   General  Chart Reviewed: Yes  Patient assessed for rehabilitation services?: Yes  Response to previous treatment: Patient with no complaints from previous session  Family / Caregiver Present: No  Diagnosis: CVA  Subjective  Subjective: Pt seated in recliner upon entry, more alert this date-agreeable to OT  Vital Signs  Patient Currently in Pain: Denies     Objective    ADL  Grooming: Setup;Minimal assistance (oral care while seated at sink)  LE Dressing: Dependent/Total  Toileting: Dependent/Total (Pt incontinent of urine)  Additional Comments: Completed LB dressing while seated in chair, second person assist d/t poor trunk control. Grooming tasks completed while seated at counter     Balance  Sitting Balance: Maximum assistance  Standing Balance: Dependent/Total  Standing Balance  Time: ~6 min total  Activity: Pt completed ~6 min in standing frame.  Pt completed vertical reaching to R in order to reduce L lateral lean. Pt was able to following 2 step directions to  rings and place them onto a gricel. Pt was able to accurately select the correct ring when given a color to choose from. Pt was able to use L hand to  rings and place them onto rods. Transfers  Sit to stand: Dependent/Total  Stand to sit: Dependent/Total      Cognition  Overall Cognitive Status: Exceptions  Arousal/Alertness: Delayed responses to stimuli  Following Commands: Follows one step commands with repetition; Follows one step commands with increased time  Attention Span: Attends with cues to redirect  Memory: Decreased recall of biographical Information;Decreased short term memory;Decreased long term memory;Decreased recall of recent events  Safety Judgement: Decreased awareness of need for safety;Decreased awareness of need for assistance  Problem Solving: Decreased awareness of errors;Assistance required to identify errors made;Assistance required to correct errors made  Insights: Decreased awareness of deficits  Initiation: Requires cues for all  Sequencing: Requires cues for all       Additional activity: Pt completed the following activities while seated EOM in order to address trunk strength/stability and midline orientation: R lateral arm prop w/ diagonal reach x10 reps x2, forward reach to floor x10 reps x2. Pt demoed improved midline orientation after completing exercises but could not maintain midline after several minutes.      Plan   Plan  Times per week: 60 min; 5-7x  Times per day: Daily  Current Treatment Recommendations: Strengthening, Patient/Caregiver Education & Training, Home Management Training, Equipment Evaluation, Education, & procurement, Balance Training, Neuromuscular Re-education, Functional Mobility Training, Endurance Training, Safety Education & Training, Self-Care / ADL    Goals  Short term goals  Time Frame for Short term goals: 1-2 weeks  Short term goal 1: Functional transfer for ADL completion w/ assist x1  Short term goal 2: Bathing w/ assist x1  Short term goal 3: LB dressing w/ assist x1  Short term goal 4: Toileting w/ assist x1  Short term goal 5: UB dressing w/ Min A  Long term goals  Time Frame for Long term goals : 3-4 weeks  Long term goal 1: Functional transfer for ADL completion w/ supervision  Long term goal 2: Bathing w/ supervision  Long term goal 3: LB dressing w/ supervision  Long term goal 4: Toileting w/ supervision  Long term goal 5: UB dressing w/ supervision  Patient Goals   Patient goals :  To go home       Therapy Time   Individual Concurrent Group Co-treatment   Time In       0830   Time Out       0930   Minutes       60        Timed Code Treatment Minutes:  60 Minutes    Total Treatment Minutes:  60 minutes       [employer], OT   [employer] OTR/L, [place] #[id number]

## 2021-07-07 NOTE — PROGRESS NOTES
Nutrition Assessment     Type and Reason for Visit: Reassess    Nutrition Recommendations/Plan:   Diet consistency per SLP     Nutrition Assessment:  Nutrition status remains stable AEB po intake at least 50% of meals. Continues to tolerate Dysphagia soft & bite sized diet. Remains at low risk for nutrition compromise. Malnutrition Assessment:  Malnutrition Status: No malnutrition    Nutrition Related Findings: +6L; trace generalized edema; LBM 7/6      Current Nutrition Therapies:    ADULT DIET; Dysphagia - Soft and Bite Sized; Mildly Thick (Nectar)    Anthropometric Measures:  · Height: 6' 2\" (188 cm)  · Current Body Wt: 226 lb (102.5 kg)   · BMI: 29    Nutrition Diagnosis:   No nutrition diagnosis at this time    Nutrition Interventions:   Food and/or Nutrient Delivery:  Continue Current Diet  Nutrition Education/Counseling:  Education not indicated   Coordination of Nutrition Care:  Continue to monitor while inpatient    Goals:  po intake greater than 50% of meals       Nutrition Monitoring and Evaluation:   Behavioral-Environmental Outcomes:  None Identified   Food/Nutrient Intake Outcomes:  Food and Nutrient Intake  Physical Signs/Symptoms Outcomes:  None Identified     Discharge Planning:     Too soon to determine     Electronically signed by Bella Norton RD, LD on 7/7/21 at 10:19 AM EDT    Contact: [phone]

## 2021-07-07 NOTE — PLAN OF CARE
Problem: Falls - Risk of:  Goal: Will remain free from falls  Description: Will remain free from falls  Outcome: Ongoing  Note: Education Provided as followed:  \"Family/Patient made aware of the tailored interventions falls plan using the TIPS TOOL. Problem: Falls - Risk of:  Goal: Absence of physical injury  Description: Absence of physical injury  Outcome: Ongoing     Problem: Skin Integrity:  Goal: Absence of new skin breakdown  Description: Absence of new skin breakdown  Outcome: Ongoing     Problem: Injury - Risk of, Physical Injury:  Goal: Will remain free from falls  Description: Will remain free from falls  Outcome: Ongoing  Note: Education Provided as followed:  \"Family/Patient made aware of the tailored interventions falls plan using the TIPS TOOL.       Problem: Injury - Risk of, Physical Injury:  Goal: Absence of physical injury  Description: Absence of physical injury  Outcome: Ongoing     Problem: IP BLADDER/VOIDING  Goal: STG - Patient demonstrates no accidents  Outcome: Ongoing     Problem: IP BOWEL ELIMINATION  Goal: LTG - patient will have regular and routine bowel evacuation  Outcome: Ongoing

## 2021-07-07 NOTE — PROGRESS NOTES
ACUTE REHAB UNIT  SPEECH/LANGUAGE PATHOLOGY       [x] Daily  [] Weekly Care Conference Note  [] Discharge    Patient:Reno Blackwell     :1935  QTD:[id number]  Room #: VHN-9002/4934-75   Rehab Dx/Hx: Acute embolic stroke [facility]) [W65.2]  Date of Admit: 2021      Precautions: falls and aspirations  Home situation: Per pt's daughter Zach Baron) pt lives with his grandson (22years old). Marilin Alonzo reports plan is for her and her family to move into his place to provide more assistance. ST Dx: Mild-moderate oropharyngeal dysphagia; Severe cognitive linguistic deficits; Moderate dysarthria / dysphonia    Daily Treatment Info:   Date: 2021     Tx session 1 Tx session 2   Pain Denied  Denied    Subjective     Pt up in chair for session. Response times became more prolonged towards the end of the session. Pt with limited carry over of new information in 2 minute intervals. Pt in bed with severe lean towards the left. Unable to reposition despite effort. Pt initiated request to use the bathroom at the end of the session. Objective:  Goals     Pt will tolerate recommended diet and advanced trials with use of compensatory swallow strategies provided with < mod cues with no overt clinical s/s of aspiration or noted difficulty.      Trial of thin liquid (hot coffee) in isolation   - pt tolerated all presentations without overt s/s of aspiration   - no overt changes in breath support / coordination with liquid in isolation   - smaller sips were implemented, likely due to hot temperature     Thin liquid in combination with meal   - required max cues (tactile feeding assistance) to isolate bites from sips, to avoid use of liquid wash to propel bolus posteriorly   - slightly increased work of breathing was evident after trials, no audible wheezing     Soft solids of meal (chopped pancakes, scrambled eggs)   - pt with minimal impulsivity (bite size and rate)   - no overt s/s of aspiration    Oropharyngeal strengthening and breath coordination exercises   - incentive spirometry: attempted x8; max of 500mL on 1/8 attempts   - max cues for comprehension of directions to propel pudding via straw for oropharyngeal strengthening; pt perseverative on eating via spoon     Trial of thin water   - pt without overt s/s of aspiration however mild expiratory wheezing   - unable to discern relation to changes in breath quality with dysphagia/aspiration; however risk for aspiration increases with these episodes            Pt will respond and/or initiate action upon verbal prompt within 3 seconds in 4 out of 5 opportunities without repetition. Sustained attention via automatic counting tasks; responded to 3/4 commands within 30 seconds  - counting to 10 by 1s: 100%   - counting backwards from 20: 50%   - counting to 100 by 5's: 20%; able to get back on target to resume the task given min cues   - counting to 50 by 10's: 0%      Goal not targeted this session. Pt will use visual aids/ strategies to state orientation to time, place, situation with 100% accuracy across 3 consecutive sessions. Oriented to temporal concepts with use of visual aids Independently. - disoriented to age:  \"47\"  Oriented to temporal concepts with use of visual aids Independently    - limited comprehension of time laps   - limited comprehension of situation and rationale for therapy      Pt will improve oral motor function, articulatory precision and breath support in connected speech via graded tasks to >80% acc with use of speech strategies. Goal not targeted this session. See above for targeted breath support task     Pt will improve verbal initiation and thought organization for extended utterances (i.e. related to personal hx/ recall of recent events, etc.) >75% acc. Goal not targeted this session.    Naming family members in photos   - pt with limited recognition of family members beyond immediate family (2/4 children, siblings and periods of tachypnea and reduced breath support impacting intelligibility at the short phrase/ sentence level with reduced articulatory precision, pt with hoarse, gravely wet vocal quality    Current Diet Order: ADULT DIET; Dysphagia - Soft and Bite Sized; Mildly Thick (Nectar)            Plan:     Frequency:  5days/week   60 minutes/day    Discharge Recommendations:   Barriers: Pre-existing cognitive deficit; severity of deficits; Limited initiation  Discharge Recommendations:  [] Home independently  [x] Home with assistance [x]  24 hour supervision  [] SNF [x] Other: TBD  Continued SLP Treatment:  [x] Yes [] No [] TBD based on progress while on ARU [] Vital Stim indicated [] Other:   Estimated discharge date: 7/16/21    Type of Total Treatment Minutes   Session 1   Session 2   Time In 0800 1325   Time Out 0830 1355   Timed Code Minutes  15 15   Individual Treatment Minutes  30 30   Co-Treatment Minutes      Group Treatment Minutes      Concurrent Treatment Minutes        TOTAL DAILY MINUTES:  60    Electronically Signed by     Neena Siddiqi M.A.  [facility] SVIKAS Mendez Pathologist [phone]  7/7/2021 11:40 AM

## 2021-07-07 NOTE — CONSULTS
PULMONARY AND CRITICAL CARE CONSULTATION NOTE    CONSULTING PHYSICIAN:      REASON FOR CONSULT: No chief complaint on file. DATE OF CONSULT: 7/7/2021    HISTORY OF PRESENT ILLNESS: 81y.o. year old male with past medical history of hypertension, dementia, hyperlipidemia, anxiety, CAD, peripheral vascular disease who is a retired orthopedic surgeon who came from the standpoint when he presented there with acute CVA along with left-sided weakness. Patient then got transferred to ARU for rehab. He recently finished a course of Levaquin for presumed pneumonia. Per primary, patient is having episodes of intermittent tachypnea with bradycardia. Pulmonary was consulted for further evaluation. When I examined the patient, patient was laying comfortably in the bed. He was able to answer my questions. No episodes of tachypnea were noted during the interview. Patient denies any shortness of breath or cough or wheezing or chest pain or hemoptysis. REVIEW OF SYSTEMS:   CONSTITUTIONAL SYMPTOMS: The patient denies fever, fatigue, night sweats, weight loss or weight gain. HEENT: No vision changes. No tinnitus, Denies sinus pain. No hoarseness, or dysphagia. NECK: Patient denies swelling in the neck. CARDIOVASCULAR: Denies chest pain, palpitation, syncope. RESPIRATORY: See above. GASTROINTESTINAL: Denies nausea, abdominal pain or change in bowel function. GENITOURINARY: Denies obstructive symptoms. No history of incontinence. BREASTS: No masses or lumps in the breasts. SKIN: No rashes or itching. MUSCULOSKELETAL: Denies weakness or bone pain. NEUROLOGICAL: No headaches or seizures. PSYCHIATRIC: Denies mood swings or depression. ENDOCRINE: Denies heat or cold intolerance or excessive thirst.  HEMATOLOGIC/LYMPHATIC: Denies easy bruising or lymph node swelling. ALLERGIC/IMMUNOLOGIC: No environmental allergies.     PAST MEDICAL HISTORY:   Past Medical History:   Diagnosis Date    Dementia ([facility] [address]  HTN (hypertension)     Hyperlipidemia     PONV (postoperative nausea and vomiting)        PAST SURGICAL HISTORY:   Past Surgical History:   Procedure Laterality Date    COLONOSCOPY      CYSTOSCOPY  04/04/2018    FLEXIBLE CYSTOSCOPY    EYE SURGERY Right 11/2014    Cataract removal    KNEE ARTHROSCOPY      TURP  04/26/2018       SOCIAL HISTORY:   Social History     Tobacco Use    Smoking status: Never Smoker    Smokeless tobacco: Never Used   Vaping Use    Vaping Use: Never used   Substance Use Topics    Alcohol use: Yes     Comment: occasional glass of wine    Drug use: No       FAMILY HISTORY:   Family History   Problem Relation Age of Onset    Heart Disease Neg Hx     High Blood Pressure Neg Hx     High Cholesterol Neg Hx        MEDICATIONS:     No current facility-administered medications on file prior to encounter. Current Outpatient Medications on File Prior to Encounter   Medication Sig Dispense Refill    atenolol (TENORMIN) 25 MG tablet Take 1 tablet by mouth daily 30 tablet 0    atorvastatin (LIPITOR) 20 MG tablet Take 1 tablet by mouth daily. (Patient taking differently: Take 40 mg by mouth daily ) 90 tablet 3        lisinopril  10 mg Oral BID    escitalopram  5 mg Oral Daily    enoxaparin  40 mg Subcutaneous Daily    aspirin  81 mg Oral Daily    atenolol  25 mg Oral Daily    atorvastatin  40 mg Oral Nightly    clopidogrel  75 mg Oral Daily    finasteride  5 mg Oral Daily    tamsulosin  0.4 mg Oral Daily       sennosides-docusate sodium, polyethylene glycol, acetaminophen, diphenhydrAMINE, hydrALAZINE, ondansetron, traMADol, traZODone    ALLERGIES:   Allergies as of 06/23/2021    (No Known Allergies)      OBJECTIVE:   height is 6' 2\" (1.88 m) and weight is 226 lb 8 oz (102.7 kg). His oral temperature is 98.4 °F (36.9 °C). His blood pressure is 180/84 (abnormal) and his pulse is 62. His respiration is 18 and oxygen saturation is 96%. I/O this shift:   In: 480 [P.O.:480]  Out: -      PHYSICAL EXAM:  CONSTITUTIONAL: He is a 81y.o.-year-old who appears his stated age. He is in no acute distress. HEENT: PERRL. No scleral icterus. No thrush, atraumatic, normocephalic. NECK: Supple, without cervical or supraclavicular lymphadenopathy:  CARDIOVASCULAR: S1 S2 RRR. Without murmer  RESPIRATORY & CHEST: Lungs are clear to auscultation and percussion. No wheezing, no crackles. Good air movement  GASTROINTESTINAL & ABDOMEN: Soft, nontender, positive bowel sounds in all quadrants, non-distended, without hepatosplenomegaly. GENITOURINARY: Deferred. MUSCULOSKELETAL: No tenderness to palpation of the axial skeleton. There is no clubbing. No cyanosis. No edema of the lower extremities. SKIN OF BODY: No rash or jaundice. PSYCHIATRIC EVALUATION: Normal affect. Patient answers questions appropriately. HEMATOLOGIC/LYMPHATIC/ IMMUNOLOGIC: No palpable lymphadenopathy. NEUROLOGIC: Alert and oriented. left hemiparesis. LABS:  Lab Results   Component Value Date    WBC 8.9 07/05/2021    HGB 12.9 (L) 07/05/2021    HCT 38.6 (L) 07/05/2021     07/05/2021    CHOL 163 01/27/2016    TRIG 142 01/27/2016    HDL 50 01/27/2016    ALT 7 (L) 06/17/2021    AST 18 06/17/2021     07/05/2021    K 4.4 07/05/2021     07/05/2021    CREATININE 0.9 07/05/2021    BUN 21 (H) 07/05/2021    CO2 26 07/05/2021    PSA 3.06 12/11/2009       Lab Results   Component Value Date    GLUCOSE 97 07/05/2021    CALCIUM 9.0 07/05/2021     07/05/2021    K 4.4 07/05/2021    CO2 26 07/05/2021     07/05/2021    BUN 21 (H) 07/05/2021    CREATININE 0.9 07/05/2021       IMAGING:  I reviewed the chest x-ray from 7/5/2021 and my interpretation is as follows. Small lung volumes.       IMPRESSION:   Intermittent tachypnea  Acute CVA with left-sided weakness  Hypertension  Hyperlipidemia  CAD  PVD      RECOMMENDATION:   Per medical records, patient has been having episodes of intermittent tachypnea with bradycardia. Generally, with central sleep apnea we would notice either Cheyne-Russell respiration or periodic breathing. In both of these, he would notice tachypnea followed by apnea/ decreased respiration. Heart rate remains unchanged. Unlikely, that we are dealing with central sleep apnea as a history not consistent. At the same time, patient Jesús tachycardia with seizure-like activity. I would recommend to place a monitor on the patient for the next 24 hours to record heart rate, respirations as well as saturations and document the episodes. Patient was noted to have small lung volumes on chest x-ray. Can use pulmonary toilet including incentive spirometry as well as Acapella every 6-8 hours. Thank you for your consultation. Alysha Cheung MD  Pulmonary Critical Care and Sleep Medicine  7/7/2021, 1:00 PM    This note was completed using dragon medical speech recognition software. Grammatical errors, random word insertions, pronoun errors and incomplete sentences are occasional consequences of this technology due to software limitations. If there are questions or concerns about the content of this note of information contained within the body of this dictation they should be addressed with the provider for clarification.

## 2021-07-07 NOTE — PROGRESS NOTES
Physical Therapy  Facility/Department: [facility] ACUTE REHAB UNIT  Daily Treatment Note  NAME: Chiquis Hopson  : 1935  MRN: [id number]    Date of Service: 2021    Discharge Recommendations:  24 hour supervision or assist, Home with Home health PT, S Level 3   PT Equipment Recommendations  Equipment Needed: Yes   Patient will require a sukhwinder lift, custom manual w/c, and hospital bed    Assessment   Body structures, Functions, Activity limitations: Decreased functional mobility ; Decreased balance;Decreased coordination;Decreased endurance;Decreased strength;Decreased posture;Decreased ADL status; Decreased safe awareness;Decreased ROM  Assessment: Pt continues to require extensive assist of 2 for all functional mobility and ADL completion at this time with ongoing extensive (L) lateral lean + pushing in both seated and standing position. Pt remains non ambulatory secondary to poor standing and sitting balance. Treatment Diagnosis: decreased balance, coordination, and functional mobiilty  Prognosis: Fair  Decision Making: Medium Complexity  PT Education: Goals;PT Role;Plan of Care;Transfer Training;General Safety; Functional Mobility Training  Patient Education: Pt will require reinforcement secondary to cognitive deficits. REQUIRES PT FOLLOW UP: Yes  Activity Tolerance  Activity Tolerance: Patient limited by endurance; Patient limited by cognitive status  Activity Tolerance: Pt had ongoing episodes of increased respiratory pattern with stare and decreased command following within therapy session. Lateral lean exaggerates throughout session as pt fatigues. Patient Diagnosis(es): CVA     has a past medical history of Dementia ([address] HTN (hypertension), Hyperlipidemia, and PONV (postoperative nausea and vomiting). has a past surgical history that includes Knee arthroscopy; Colonoscopy; eye surgery (Right, 2014);  Cystoscopy (2018); and TURP (04/26/2018). Restrictions  Restrictions/Precautions  Restrictions/Precautions: Fall Risk, Modified Diet (nectar thick liquids)  Required Braces or Orthoses?: No  Position Activity Restriction  Other position/activity restrictions: 81year old male with a history of CAD, dementia, PVD who was admitted to [facility] on 6/19 with left-sided weakness. He was recently evaluated at this facility for an episode of syncope. Work-up revealed a right watershed ischemic stroke in the right frontal and parietal regions. He was initiated on Plavix in addition to aspirin and statin. A1c 5.3. LDL 88. Echo was unremarkable for an embolic source. He was evaluated by therapy and suggested to continue in an inpatient setting prior to returning home. He has no current complaints today. He is currently sitting up with therapy and having intermittent episodes of increased respirations with decreased heart rate. Subjective   General  Chart Reviewed: Yes  Additional Pertinent Hx: dementia, HTN, DM  Response To Previous Treatment: Patient with no complaints from previous session. Family / Caregiver Present: No  Referring Practitioner: Dr Sherry Schuler  Subjective  Subjective: Pt in [facility] on arrival.  Agreeable to therapy services. Pt states that he slept well last night. Pain Screening  Patient Currently in Pain: Denies  Vital Signs  Patient Currently in Pain: Denies       Orientation     Cognition      Objective      Transfers  Sit to Stand: 2 Person Assistance;Maximum Assistance  Stand to sit: 2 Person Assistance;Dependent/Total  Bed to Chair: 2 Person Assistance;Dependent/Total  Comment: extensive (L) lateral lean in both seated prep and standing transfer completion  Ambulation  Ambulation?: No  Stairs/Curb  Stairs?: No     Balance  Posture: Poor  Sitting - Static: Poor  Sitting - Dynamic: Poor;-  Standing - Static: Poor;-  Standing - Dynamic: Poor;-  Comments: consistent (L) lateral lean in all positions. Neuromuscular Re-education, Patient/Caregiver Education & Training, ROM, Wheelchair Mobility Training, Manual Therapy - Soft Tissue Mobilization, Equipment Evaluation, Education, & procurement, Balance Training, Gait Training, Functional Mobility Training, Stair training, Safety Education & Training, Positioning, ADL/Self-care Training  Safety Devices  Type of devices: Call light within reach, Chair alarm in place, Left in chair, Telesitter in use, Gait belt, All fall risk precautions in place  Restraints  Initially in place: No     Therapy Time   Individual Concurrent Group Co-treatment   Time In       0830   Time Out       0930   Minutes       60          Timed Code Treatment Minutes:  60    Total Treatment Minutes:  [address], [address] [place] [address], [place] [address]

## 2021-07-07 NOTE — CARE COORDINATION
Patient's family requesting that patient be referred to [facility] for [facility] follow-up at discharge. SW contacted [facility] and faxed referral documentation to:    [facility]  [phone] (fax)    Heidy Clinton will continue to follow progress and update patient's discharge plan as needed.     Electronically signed by WING Ramirez on 7/7/2021 at 12:05 PM

## 2021-07-07 NOTE — PROGRESS NOTES
Heart monitor dropped off for patient by family member. Cardiology RN called and she will assess if she can apply device on to patient.  Electronically signed by Yessy Nelson RN on 7/7/2021 at 10:45 AM

## 2021-07-07 NOTE — PLAN OF CARE
Problem: Falls - Risk of:  Goal: Will remain free from falls  Description: Will remain free from falls  7/7/2021 1012 by Katrin Gomez RN  Outcome: Ongoing  Note: Pt will be free from falls , pt aware of ARU policy on falls, will call for needs/assistance. Fall risk precautions in place, call light in reach, bed in lowest position, 2/2 bed rails up, bed wheels locked, bed side table within reach, bed alarm on, will continue to monitor. Problem: Falls - Risk of:  Goal: Absence of physical injury  Description: Absence of physical injury  7/7/2021 1012 by Katrin Gomez RN  Outcome: Ongoing  Note: Pt is free of injury. No injury noted. Fall precautions in place. Call light within reach. Will monitor. Problem: Skin Integrity:  Goal: Absence of new skin breakdown  Description: Absence of new skin breakdown  7/7/2021 1012 by Katrin Gomez RN  Outcome: Ongoing  Note: No new skin break down identified. Will continue to assess and monitor. Problem: Injury - Risk of, Physical Injury:  Goal: Will remain free from falls  Description: Will remain free from falls  7/7/2021 1012 by Katrin Gomez RN  Outcome: Ongoing  Note: Pt will be free from falls , pt aware of ARU policy on falls, will call for needs/assistance. Fall risk precautions in place, call light in reach, bed in lowest position, 2/2 bed rails up, bed wheels locked, bed side table within reach, bed alarm on, will continue to monitor.

## 2021-07-07 NOTE — PROGRESS NOTES
Stand pivot  Equipment Used: Extra wide bedside commode  Toilet Transfers Comments: Max A x2  Assessment        SLP                Body mass index is 29.08 kg/m². Assessment:  Patient Active Problem List   Diagnosis    Essential hypertension    Hyperlipidemia    Palpitations    Family history of premature CAD    Shoulder pain    Knee pain    Late onset Alzheimer's disease without behavioral disturbance ([facility] [address]    Acute renal failure (ARF) (HCC)    Benign prostatic hyperplasia (BPH) with post-void dribbling    Near syncope    Dementia due to Alzheimer's disease ([facility] [address]    Acute embolic stroke ([facility] [address]       Plan:   Right frontal and parietal watershed infarcts: ASA, statin. Plavix for 21 days (7/10). PT/OT/SLP. Event monitor mentioned in discharge notes but no monitor with patient. Family to bring in today. Started Lexapro for motor recovery     Dysphagia: dysphagia diet, SLP    Intermittent tachypnea: HR decreased during episodes. Sats stable during episodes. Working to obtain [employer] CTR monitor. Consider seizure activity vs central etiology as alternative options. - consult pulm for input.      CAD: ASA, statin      HTN: atenolol, lisinopril 10 -> 10 BID     Dementia: SLP     Urinary retention: flomax, proscar. PNA: suspect aspiration related. Levaquin completed     Bowels: Per protocol  Bladder: Per protocol   Sleep: Trazodone provided prn. Pain: tylenol, tramadol   DVT PPx: Lovenox   SANTIAGO: 7/16    Electronically signed by Alee Villalba MD on 7/7/2021 at 9:20 AM    * This document was created using dictation software. While all precautions were taken to ensure accuracy, errors may have occurred. Please disregard any typographical errors.

## 2021-07-07 NOTE — CARE COORDINATION
JESSICA spoke with patient's daughter Vianney Workman regarding patient's discharge plan. · Patient's daughter in the process of contacting [employer] to see want DME [employer] will cover. · Patient's daughter also checking with family members to schedule a time for a Family Meeting. Patient's daughter will contact JESSICA when she has answers to these questions. JESSICA will continue to follow progress and update patient's discharge plan as needed.     Electronically signed by WING Cobos on 7/7/2021 at 11:51 AM

## 2021-07-08 LAB
ANION GAP SERPL CALCULATED.3IONS-SCNC: 5 MMOL/L (ref 3–16)
BUN BLDV-MCNC: 19 MG/DL (ref 7–20)
CALCIUM SERPL-MCNC: 9 MG/DL (ref 8.3–10.6)
CHLORIDE BLD-SCNC: 106 MMOL/L (ref 99–110)
CO2: 28 MMOL/L (ref 21–32)
CREAT SERPL-MCNC: 1 MG/DL (ref 0.8–1.3)
GFR AFRICAN AMERICAN: >60
GFR NON-AFRICAN AMERICAN: >60
GLUCOSE BLD-MCNC: 96 MG/DL (ref 70–99)
HCT VFR BLD CALC: 40 % (ref 40.5–52.5)
HEMOGLOBIN: 13.4 G/DL (ref 13.5–17.5)
MCH RBC QN AUTO: 29.7 PG (ref 26–34)
MCHC RBC AUTO-ENTMCNC: 33.4 G/DL (ref 31–36)
MCV RBC AUTO: 88.8 FL (ref 80–100)
PDW BLD-RTO: 14.8 % (ref 12.4–15.4)
PLATELET # BLD: 347 K/UL (ref 135–450)
PMV BLD AUTO: 8.3 FL (ref 5–10.5)
POTASSIUM SERPL-SCNC: 4.3 MMOL/L (ref 3.5–5.1)
RBC # BLD: 4.5 M/UL (ref 4.2–5.9)
SODIUM BLD-SCNC: 139 MMOL/L (ref 136–145)
WBC # BLD: 9.7 K/UL (ref 4–11)

## 2021-07-08 PROCEDURE — 97535 SELF CARE MNGMENT TRAINING: CPT

## 2021-07-08 PROCEDURE — 85027 COMPLETE CBC AUTOMATED: CPT

## 2021-07-08 PROCEDURE — 97129 THER IVNTJ 1ST 15 MIN: CPT

## 2021-07-08 PROCEDURE — 92526 ORAL FUNCTION THERAPY: CPT

## 2021-07-08 PROCEDURE — 80048 BASIC METABOLIC PNL TOTAL CA: CPT

## 2021-07-08 PROCEDURE — 97530 THERAPEUTIC ACTIVITIES: CPT

## 2021-07-08 PROCEDURE — 6370000000 HC RX 637 (ALT 250 FOR IP): Performed by: PHYSICAL MEDICINE & REHABILITATION

## 2021-07-08 PROCEDURE — 6360000002 HC RX W HCPCS: Performed by: PHYSICAL MEDICINE & REHABILITATION

## 2021-07-08 PROCEDURE — 97130 THER IVNTJ EA ADDL 15 MIN: CPT

## 2021-07-08 PROCEDURE — 6370000000 HC RX 637 (ALT 250 FOR IP): Performed by: NURSE PRACTITIONER

## 2021-07-08 PROCEDURE — 1280000000 HC REHAB R&B

## 2021-07-08 RX ADMIN — ASPIRIN 81 MG: 81 TABLET, CHEWABLE ORAL at 09:03

## 2021-07-08 RX ADMIN — ESCITALOPRAM OXALATE 5 MG: 10 TABLET ORAL at 09:04

## 2021-07-08 RX ADMIN — TAMSULOSIN HYDROCHLORIDE 0.4 MG: 0.4 CAPSULE ORAL at 09:03

## 2021-07-08 RX ADMIN — ATORVASTATIN CALCIUM 40 MG: 40 TABLET, FILM COATED ORAL at 20:03

## 2021-07-08 RX ADMIN — ATENOLOL 25 MG: 50 TABLET ORAL at 09:04

## 2021-07-08 RX ADMIN — LISINOPRIL 10 MG: 10 TABLET ORAL at 09:09

## 2021-07-08 RX ADMIN — LISINOPRIL 10 MG: 10 TABLET ORAL at 20:06

## 2021-07-08 RX ADMIN — ENOXAPARIN SODIUM 40 MG: 40 INJECTION SUBCUTANEOUS at 09:03

## 2021-07-08 RX ADMIN — FINASTERIDE 5 MG: 5 TABLET, FILM COATED ORAL at 09:06

## 2021-07-08 RX ADMIN — CLOPIDOGREL BISULFATE 75 MG: 75 TABLET ORAL at 09:07

## 2021-07-08 ASSESSMENT — PAIN SCALES - GENERAL
PAINLEVEL_OUTOF10: 0

## 2021-07-08 NOTE — PROGRESS NOTES
Physical Therapy  Facility/Department: [facility] ACUTE REHAB UNIT  Daily Treatment Note  NAME: Facundo Sanders  : 1935  MRN: [id number]    Date of Service: 2021    Discharge Recommendations:  24 hour supervision or assist, Home with Home health PT, S Level 3   PT Equipment Recommendations  Equipment Needed: Yes  Other: TBD with progress. Currently, pt will require a sukhwinder lift and manual w/c and ramp    Assessment   Body structures, Functions, Activity limitations: Decreased functional mobility ; Decreased balance;Decreased coordination;Decreased endurance;Decreased strength;Decreased posture;Decreased ADL status; Decreased safe awareness;Decreased ROM  Assessment: Pt continues to require extensive assist of 2 for all functional mobility and ADL completion at this time with ongoing extensive (L) lateral lean + pushing in both seated and standing position. Pt remains non ambulatory secondary to poor standing and sitting balance. Pt seemed to be more cognitively alert at the begining of today's session, but decreased with fatigue. Treatment Diagnosis: decreased balance, coordination, and functional mobiilty  Prognosis: Fair  Decision Making: Medium Complexity  PT Education: Goals;PT Role;Plan of Care;Transfer Training;General Safety; Functional Mobility Training  Patient Education: Pt will require reinforcement secondary to cognitive deficits. Barriers to Learning: cognition  REQUIRES PT FOLLOW UP: Yes  Activity Tolerance  Activity Tolerance: Patient limited by endurance; Patient limited by cognitive status  Activity Tolerance: Pt had ongoing episodes of increased respiratory pattern with stare and decreased command following within therapy session. Lateral lean exaggerates throughout session as pt fatigues. Patient Diagnosis(es): CVA     has a past medical history of Dementia ([facility] [address] HTN (hypertension), Hyperlipidemia, and PONV (postoperative nausea and vomiting).    has a past surgical history that includes Knee arthroscopy; Colonoscopy; eye surgery (Right, 11/2014); Cystoscopy (04/04/2018); and TURP (04/26/2018). Restrictions  Restrictions/Precautions  Restrictions/Precautions: Fall Risk, Modified Diet  Required Braces or Orthoses?: No  Position Activity Restriction  Other position/activity restrictions: 81year old male with a history of CAD, dementia, PVD who was admitted to [facility] on 6/19 with left-sided weakness. He was recently evaluated at this facility for an episode of syncope. Work-up revealed a right watershed ischemic stroke in the right frontal and parietal regions. He was initiated on Plavix in addition to aspirin and statin. A1c 5.3. LDL 88. Echo was unremarkable for an embolic source. He was evaluated by therapy and suggested to continue in an inpatient setting prior to returning home. He has no current complaints today. He is currently sitting up with therapy and having intermittent episodes of increased respirations with decreased heart rate. Subjective   General  Chart Reviewed: Yes  Additional Pertinent Hx: dementia, HTN, DM  Response To Previous Treatment: Patient with no complaints from previous session. Family / Caregiver Present: No  Referring Practitioner: Dr Dago Echols  Subjective  Subjective: Pt in reclincer on arrival.  Agreeable to therapy services. Pt states that he slept well last night.   General Comment  Comments: Pt seated in recliner upon arrival.  Pain Screening  Patient Currently in Pain: Denies  Vital Signs  Patient Currently in Pain: Denies       Orientation     Cognition      Objective   Bed mobility  Rolling to Left: Maximum assistance  Rolling to Right: Maximum assistance  Sit to Supine: Dependent/Total  Transfers  Sit to Stand: 2 Person Assistance;Dependent/Total  Stand to sit: 2 Person Assistance;Dependent/Total  Stand Pivot Transfers: 2 Person Assistance;Dependent/Total  Comment: extensive (L) lateral lean in both seated prep and standing transfer completion  Ambulation  Ambulation?: No  Stairs/Curb  Stairs?: No     Balance  Posture: Poor  Sitting - Static: Poor  Sitting - Dynamic: Poor;-  Standing - Static: Poor;-  Standing - Dynamic: Poor;-  Comments: consistent (L) lateral lean in all positions. Patient able to partially self correct in seated position. Requires max (A) to obtain and maintain midline position. Comment: Arrived to pts room with pt sitting in recliner with severe L lean with pelvis slid forwards, required maxA of 3 to readjust pt into proper sitting position. Skylift was utilized to transfer pt to w/c. SPT from w/c to mat table with maxA of 2. MaxA to reposition L lateral lean and maintian upright posture. Pt sat edge of mat with max A to be measured for custom wheelchair. Provided information regarding home situation and current physical ability to [employer] for custom w/c. Discussed yesterday with daughter proposed components and daughter requested flat foam cushion. Pt performed reaching for cones across body with contralateral UE while proped on ipsilateral  elbow 1x6, B. Pt required modA-totalA to resist excessive trunk flexion and initiate wt shift with trunk and pelvis. Pt performed reaching for bean bags on ipsilateral side 1x5 B and tossing it into basket 3 feet. Pt required Aaron to resist excessive trunk extension and initiate wt shift. Pt sat on EOB with maxA to maintain upright posture while pt performed feeding task with OT. Pt transported back to room in w/c and utilized skylift to transfer pt from w/c to bed. Pillows were proped under L shoulder and hip to help maintain strraight alignment. Pt left with bed alarm on and all needs within reach.               G-Code     OutComes Score                                                     AM-PAC Score             Goals  Short term goals  Time Frame for Short term goals: 1-2 weeks  Short term goal 1: Pt will perform all bed mobility with maxA of 1  Short term goal 2: Pt will perform SPT with maxA of 1  Short term goal 3: Pt will ambulate 8' with LRAD with maxA of 1  Short term goal 4: Pt will negotiate 1 step with LRAD and maxA of 1  Long term goals  Time Frame for Long term goals : 2-3 weeks  Long term goal 1: Pt will perform all bed mobility with CGA  Long term goal 2: Pt will perform all transfers with CGA  Long term goal 3: Pt will ambulate 48' with LRAD CGA  Long term goal 4: Pt will negotiate 12 steps with kapil  Patient Goals   Patient goals : pt unable to assess    Plan    Plan  Times per week: 60 minutes a day 5 days a week  Current Treatment Recommendations: Strengthening, Transfer Training, Endurance Training, Neuromuscular Re-education, Patient/Caregiver Education & Training, ROM, Wheelchair Mobility Training, Manual Therapy - Soft Tissue Mobilization, Equipment Evaluation, Education, & procurement, Balance Training, Gait Training, Functional Mobility Training, Stair training, Safety Education & Training, Positioning, ADL/Self-care Training  Safety Devices  Type of devices: Call light within reach, Chair alarm in place, Left in chair, Telesitter in use, Gait belt, All fall risk precautions in place  Restraints  Initially in place: No     Therapy Time   Individual Concurrent Group Co-treatment   Time In       1245   Time Out       1345   Minutes       60        Timed Code Treatment Minutes:  60    Total Treatment Minutes:  [address], SPT    Therapist was present, directed the patient's care, made skilled judgement, and was responsible for assessment and treatment of the patient.  Co-signed and supervised by:  Miller Arce, DPT [id number]

## 2021-07-08 NOTE — PROGRESS NOTES
Occupational Therapy  Facility/Department: [facility] ACUTE REHAB UNIT  Daily Treatment Note  NAME: Liana Postal  : 1935  MRN: [id number]    Date of Service: 2021    Discharge Recommendations:  Home with Home health OT, S Level 3, Home with nursing aide, 24 hour supervision or assist  OT Equipment Recommendations  Equipment Needed: Yes  Other: Arnold Catina lift, hospital bed, BSC if family prefers over bedpan    Assessment   Performance deficits / Impairments: Decreased functional mobility ; Decreased endurance;Decreased coordination;Decreased posture;Decreased ADL status; Decreased ROM; Decreased strength;Decreased balance;Decreased vision/visual deficit; Decreased high-level IADLs;Decreased safe awareness;Decreased cognition;Decreased fine motor control  Assessment: 81 y.o. male presents w/ the above deficits which are impacting his occupational performance. Pt would benefit from continued therapy during inpatient stay in order to maximize safety and independence upon discharge. Treatment Diagnosis: Multiple performance deficits s/p CVA  OT Education: OT Role;Plan of Care;ADL Adaptive Strategies;Transfer Training;Orientation  Patient Education: Pt is not an independent learner  Barriers to Learning: Cognition  REQUIRES OT FOLLOW UP: Yes  Activity Tolerance  Activity Tolerance: Patient Tolerated treatment well;Treatment limited secondary to decreased cognition  Activity Tolerance: Pt continues to demo L lateral lean and pushing behavior which as limited pt's ability to fully participate in therapy  Safety Devices  Safety Devices in place: Yes  Type of devices: All fall risk precautions in place;Call light within reach; Chair alarm in place;Gait belt;Patient at risk for falls; Left in chair;Nurse notified         Patient Diagnosis(es): CVA     has a past medical history of Dementia ([facility] [address] HTN (hypertension), Hyperlipidemia, and PONV (postoperative nausea and vomiting).    has a past surgical history that includes Knee arthroscopy; Colonoscopy; eye surgery (Right, 11/2014); Cystoscopy (04/04/2018); and TURP (04/26/2018). Restrictions  Restrictions/Precautions  Restrictions/Precautions: Fall Risk, Modified Diet  Required Braces or Orthoses?: No  Position Activity Restriction  Other position/activity restrictions: 81year old male with a history of CAD, dementia, PVD who was admitted to [facility] on 6/19 with left-sided weakness. He was recently evaluated at this facility for an episode of syncope. Work-up revealed a right watershed ischemic stroke in the right frontal and parietal regions. He was initiated on Plavix in addition to aspirin and statin. A1c 5.3. LDL 88. Echo was unremarkable for an embolic source. He was evaluated by therapy and suggested to continue in an inpatient setting prior to returning home. He has no current complaints today. He is currently sitting up with therapy and having intermittent episodes of increased respirations with decreased heart rate. Subjective   General  Chart Reviewed: Yes  Patient assessed for rehabilitation services?: Yes  Response to previous treatment: Patient with no complaints from previous session  Family / Caregiver Present: No  Diagnosis: CVA  Subjective  Subjective: Pt seated in recliner upon entry-agreeable to OT  Vital Signs  Patient Currently in Pain: No     Objective    ADL  Feeding: Supervision  LE Dressing: Dependent/Total  Toileting: Dependent/Total  Additional Comments: Pt incontinent of urine. Completed toileting and LB dressing while seatd EOB. Completed feeding while seated EOM in order to address sitting balance during functional activity. Max A-Dependent to maintain sitting balance. Pt required cues to locate food items on tray. Pt would provide an answer when given a choice of two items to eat/drink. Pt w/ impulsivity when self feeding, taking several bites prior to swallowing.  Pt will eat R side of plate w/ cues for initiation but requires cues to locate items on L side of plate. Balance  Sitting Balance: Dependent/Total (Varied assist from Max A>dependent)  Standing Balance  Time: ~30 seconds  Activity: LB clothing management  Bed mobility  Supine to Sit: Dependent/Total;2 Person assistance  Sit to Supine: Dependent/Total  Transfers  Sit to stand: Dependent/Total  Stand to sit: Dependent/Total  Transfer Comments: Use of sukhwinder for remainder of transfers      Cognition  Overall Cognitive Status: Exceptions  Arousal/Alertness: Delayed responses to stimuli  Following Commands: Follows one step commands with repetition; Follows one step commands with increased time  Attention Span: Attends with cues to redirect  Memory: Decreased recall of biographical Information;Decreased short term memory;Decreased long term memory;Decreased recall of recent events  Safety Judgement: Decreased awareness of need for safety;Decreased awareness of need for assistance  Problem Solving: Decreased awareness of errors;Assistance required to identify errors made;Assistance required to correct errors made  Insights: Decreased awareness of deficits  Initiation: Requires cues for all  Sequencing: Requires cues for all      Additional activity: Pt completed the following activities in order to address trunk strength/stability and midline orientation: R forearm prop w/ diagonal reach x10 reps, L forearm prop w/ diagonal reach x10 reps, targeted bean bag toss w/ R lateral reach x10 reps, targeted bean bag toss w/ L hand x10 reps. Pt w/ significant L lateral lean throughout requiring Max A to dependent to maintain midline in sitting.      Plan   Plan  Times per week: 60 min; 5-7x  Times per day: Daily  Current Treatment Recommendations: Strengthening, Patient/Caregiver Education & Training, Home Management Training, Equipment Evaluation, Education, & procurement, Balance Training, Neuromuscular Re-education, Functional Mobility Training, Endurance Training, Safety Education & Training, Self-Care / ADL    Goals  Short term goals  Time Frame for Short term goals: 1-2 weeks  Short term goal 1: Functional transfer for ADL completion w/ assist x1  Short term goal 2: Bathing w/ assist x1  Short term goal 3: LB dressing w/ assist x1  Short term goal 4: Toileting w/ assist x1  Short term goal 5: UB dressing w/ Min A  Long term goals  Time Frame for Long term goals : 3-4 weeks  Long term goal 1: Functional transfer for ADL completion w/ supervision  Long term goal 2: Bathing w/ supervision  Long term goal 3: LB dressing w/ supervision  Long term goal 4: Toileting w/ supervision  Long term goal 5: UB dressing w/ supervision  Patient Goals   Patient goals :  To go home       Therapy Time   Individual Concurrent Group Co-treatment   Time In       3414   Time Out       1345   Minutes       60        Timed Code Treatment Minutes:  60 Minutes    Total Treatment Minutes:  60 minutes       Veryl Cowden, OT Veryl Cowden OTR/L, [place] #[id number]

## 2021-07-08 NOTE — PROGRESS NOTES
ACUTE REHAB UNIT  SPEECH/LANGUAGE PATHOLOGY       [x] Daily  [] Weekly Care Conference Note  [] Discharge    Patient:Reno Saez     :1935  A:[id number]  [place] [facility] [place] [place]43[place]   Rehab Dx/Hx: Acute embolic stroke [facility]) [H78.7]  Date of Admit: 2021      Precautions: falls and aspirations  Home situation: Per pt's daughter Janee Sparrow) pt lives with his grandson (22years old). David Cook reports plan is for her and her family to move into his place to provide more assistance. ST Dx: Mild-moderate oropharyngeal dysphagia; Severe cognitive linguistic deficits; Moderate dysarthria / dysphonia    Daily Treatment Info:   Date: 2021     Tx session 1 Tx session 2   Pain Denied  Denied    Subjective     Pt seen sitting upright in chair; RNs had just transferred pt into chair from bed upon SLP entry. Pt alert and cooperative but with delayed response times. Pt learning to L side often, needing repositioned throughout session. Pt sleeping in chair upon SLP entry to room but was responsive to verbal cues. He remained alert during session. Leaning to L side again; difficult to maintain in midline position. Pt needed more repetitions of questions to initiate a verbal response this session. Objective:  Goals     Pt will tolerate recommended diet and advanced trials with use of compensatory swallow strategies provided with < mod cues with no overt clinical s/s of aspiration or noted difficulty.      Trial of thin liquid water in isolation   -SLP cued pt for single, small sips with each trial  -tolerated 3/3 trials without overt s/s aspiration  -no overt changes in breathing noted    Trials of thin liquid water during breakfast meal   -pt required verbal cues to initiate sips of liquids during meal  -no changes in WOB noted with trials of thin in combination with meal    Pt seen with breakfast meal consisting of scrambled eggs, chopped pancakes, and ground sausage  -pt initiated self feeding  -adequate Pt notified that he is due for annual AHA px. Appt scheduled for 11/7/17. mastication of all consistencies  -pt taking generally appropriate size bite but needed verbal cues to eat at slow rate and to clear oral cavity between bites; pt not always responsive to cueing  -no overt s/s aspiration of any solids during meal   Goal not formally targeted this session; however, pt was seen taking medications (whole in applesauce) without difficulty or s/s aspiration. Pt will respond and/or initiate action upon verbal prompt within 3 seconds in 4 out of 5 opportunities without repetition. During meal, pt initiated self-feeding independently. However, he did occasionally stop during meal (x3) and needed verbal cues to continue eating. Pt responded to automatic conversational questions (ie \"How are you? \") within 3 seconds in 3/3 opportunities. He is noted with delayed or absent responses in more complex/open-ended questions, requiring repetitions of questions ~50% of the time. Attention:  -upon SLP entry to room, pt's TV was on with volume muted. Pt appeared to distracted by picture on television during first 10 minutes of session, which increased response times. SLP turned TV off to increase pt's attention to therapy tasks and assist with initiation to questions/prompts. Pt will use visual aids/ strategies to state orientation to time, place, situation with 100% accuracy across 3 consecutive sessions. Responded \"I don't know\" when asked temporal orientation questions. When cued to utilize digital orientation clock, pt then was able to provide appropriate responses to all temporal orientation questions. Pt independently oriented to [employer] and \"[place]\" but required f/3 choices for city. Pt independently oriented to reason for hospitalization. Goal not targeted this session. Pt will improve oral motor function, articulatory precision and breath support in connected speech via graded tasks to >80% acc with use of speech strategies. Goal not targeted this session. room     Assessment:   Speech Therapy Diagnosis  Cognitive Diagnosis: Pt presents with severe cognitive linguistic deficits due to reduced orienation, reduced processing speed, reduced insight into current situation, reduced immediate/ working/ short term memory with fx problem solving difficulty. Per chart review pt with baseline dementia, did not complete IADLs at home prior to CVA but was able to be safely left alone with daily check ins from family. Aphasia Diagnosis: Significantly reduced processing speed/ difficulty following multi-step commands. Unable to discern if due to pt being [place] vs reduced auditory comprehension/ initiation, requires further assessment. Pt c/o word finding diffiuclty, pt with minimal verbal output during evaluation, requires ongoing assessment for goal generation. Speech Diagnosis: Moderate dysarthria/ dysphonia, pt with intermittent periods of tachypnea and reduced breath support impacting intelligibility at the short phrase/ sentence level with reduced articulatory precision, pt with hoarse, gravely wet vocal quality    Current Diet Order: ADULT DIET; Dysphagia - Soft and Bite Sized; Mildly Thick (Nectar)            Plan:     Frequency:  5days/week   60 minutes/day    Discharge Recommendations:   Barriers: Pre-existing cognitive deficit; severity of deficits;  Limited initiation  Discharge Recommendations:  [] Home independently  [x] Home with assistance [x]  24 hour supervision  [] SNF [x] Other: TBD  Continued SLP Treatment:  [x] Yes [] No [] TBD based on progress while on ARU [] Vital Stim indicated [] Other:   Estimated discharge date: 7/16/21    Type of Total Treatment Minutes   Session 1   Session 2   Time In 0800 0900   Time Out 0830 0930   Timed Code Minutes  15 30   Individual Treatment Minutes  30 30   Co-Treatment Minutes      Group Treatment Minutes      Concurrent Treatment Minutes        TOTAL DAILY MINUTES:  60    Electronically Signed by     Rc Thompson M.S. CCC-SLP  Speech-Language Pathologist  7/8/2021 11:31 AM

## 2021-07-08 NOTE — PROGRESS NOTES
Jacinda Organ  7/8/2021  [id number]    Chief Complaint: Acute embolic stroke ([address]    Subjective:   No overnight events. No current complaints. BP improved this am. Cardiac monitor in place. ROS: No CP, SOB, dyspnea    Objective:  Patient Vitals for the past 24 hrs:   BP Temp Temp src Pulse Resp SpO2   07/08/21 0845 136/70 98 °F (36.7 °C) Oral 74 18 94 %   07/07/21 2005 (!) 162/88 98.1 °F (36.7 °C) Oral 68 18 92 %     Gen: No distress, pleasant. Resting in bedside chair  HEENT: Normocephalic, atraumatic  CV: Regular rate and rhythm. No MRG   Resp: No respiratory distress. CTAB. Abd: Soft, nontender nondistended  Ext: No edema. Neuro: Alert, poor initiation, appropriately interactive. Left neglect    Laboratory data: Available via EMR. Therapy progress:  PT  Position Activity Restriction  Other position/activity restrictions: 81year old male with a history of CAD, dementia, PVD who was admitted to [facility] on 6/19 with left-sided weakness. He was recently evaluated at this facility for an episode of syncope. Work-up revealed a right watershed ischemic stroke in the right frontal and parietal regions. He was initiated on Plavix in addition to aspirin and statin. A1c 5.3. LDL 88. Echo was unremarkable for an embolic source. He was evaluated by therapy and suggested to continue in an inpatient setting prior to returning home. He has no current complaints today. He is currently sitting up with therapy and having intermittent episodes of increased respirations with decreased heart rate. Objective     Sit to Stand: 2 Person Assistance, Maximum Assistance  Stand to sit: 2 Person Assistance, Dependent/Total  Bed to Chair: 2 Person Assistance, Dependent/Total     OT  PT Equipment Recommendations  Equipment Needed: Yes  Other: TBD with progress.   Currently, pt will require a sukhwinder lift and manual w/c  Toilet - Technique: Stand pivot  Equipment Used: Extra wide bedside commode  Toilet Transfers Comments: Max A x2  Assessment        SLP                Body mass index is 29.08 kg/m². Assessment:  Patient Active Problem List   Diagnosis    Essential hypertension    Hyperlipidemia    Palpitations    Family history of premature CAD    Shoulder pain    Knee pain    Late onset [facility]'s disease without [facility] disturbance [facility]    Acute renal failure (ARF) (HCC)    Benign prostatic hyperplasia (BPH) with post-void dribbling    Near syncope    Dementia due to [facility]'s disease [facility]    Acute embolic stroke ([facility]       Plan:   Right frontal and parietal watershed infarcts: ASA, statin. Plavix for 21 days (7/10). PT/OT/SLP. Event monitor in place. Started Lexapro for motor recovery     Dysphagia: dysphagia diet, SLP    Intermittent tachypnea: HR decreased during episodes. Sats stable during episodes. Consider seizure activity vs central etiology as alternative options. Consulted pulm, appreciate assistance.      CAD: ASA, statin      HTN: atenolol, lisinopril 10 BID     Dementia: SLP     Urinary retention: flomax, proscar. PNA: suspect aspiration related. Levaquin completed     Bowels: Per protocol  Bladder: Per protocol   Sleep: Trazodone provided prn. Pain: tylenol, tramadol   DVT PPx: Lovenox   SANTIAGO: 7/16    Electronically signed by Kathleen Abbasi MD on 7/8/2021 at 9:14 AM    * This document was created using dictation software. While all precautions were taken to ensure accuracy, errors may have occurred. Please disregard any typographical errors.

## 2021-07-08 NOTE — PROGRESS NOTES
Shift assessment complete. VSS. Scheduled medications given. Pt. denies pain at this time. Fall precautions in place. Call light within reach. Pt. denies further needs at this time.

## 2021-07-09 PROCEDURE — 97130 THER IVNTJ EA ADDL 15 MIN: CPT

## 2021-07-09 PROCEDURE — 97530 THERAPEUTIC ACTIVITIES: CPT

## 2021-07-09 PROCEDURE — 92526 ORAL FUNCTION THERAPY: CPT

## 2021-07-09 PROCEDURE — 97535 SELF CARE MNGMENT TRAINING: CPT

## 2021-07-09 PROCEDURE — 1280000000 HC REHAB R&B

## 2021-07-09 PROCEDURE — 97129 THER IVNTJ 1ST 15 MIN: CPT

## 2021-07-09 PROCEDURE — 6370000000 HC RX 637 (ALT 250 FOR IP): Performed by: NURSE PRACTITIONER

## 2021-07-09 PROCEDURE — 6370000000 HC RX 637 (ALT 250 FOR IP): Performed by: PHYSICAL MEDICINE & REHABILITATION

## 2021-07-09 PROCEDURE — 6360000002 HC RX W HCPCS: Performed by: PHYSICAL MEDICINE & REHABILITATION

## 2021-07-09 RX ADMIN — LISINOPRIL 10 MG: 10 TABLET ORAL at 22:04

## 2021-07-09 RX ADMIN — ESCITALOPRAM OXALATE 5 MG: 10 TABLET ORAL at 08:00

## 2021-07-09 RX ADMIN — ENOXAPARIN SODIUM 40 MG: 40 INJECTION SUBCUTANEOUS at 08:02

## 2021-07-09 RX ADMIN — CLOPIDOGREL BISULFATE 75 MG: 75 TABLET ORAL at 08:00

## 2021-07-09 RX ADMIN — ATORVASTATIN CALCIUM 40 MG: 40 TABLET, FILM COATED ORAL at 22:04

## 2021-07-09 RX ADMIN — TAMSULOSIN HYDROCHLORIDE 0.4 MG: 0.4 CAPSULE ORAL at 08:00

## 2021-07-09 RX ADMIN — ASPIRIN 81 MG: 81 TABLET, CHEWABLE ORAL at 08:00

## 2021-07-09 RX ADMIN — LISINOPRIL 10 MG: 10 TABLET ORAL at 08:00

## 2021-07-09 RX ADMIN — FINASTERIDE 5 MG: 5 TABLET, FILM COATED ORAL at 08:00

## 2021-07-09 RX ADMIN — ATENOLOL 25 MG: 50 TABLET ORAL at 08:00

## 2021-07-09 ASSESSMENT — PAIN SCALES - GENERAL
PAINLEVEL_OUTOF10: 0
PAINLEVEL_OUTOF10: 0

## 2021-07-09 NOTE — PROGRESS NOTES
Occupational Therapy  Facility/Department: [facility] ACUTE REHAB UNIT  Daily Treatment Note  NAME: Jeff Rivas  : 1935  MRN: [id number]    Date of Service: 2021    Discharge Recommendations:  Home with Home health OT, S Level 3, Home with nursing aide, 24 hour supervision or assist  OT Equipment Recommendations  Equipment Needed: Yes  Other: Inna Karley lift, hospital bed, BSC if family prefers over bedpan    Assessment   Performance deficits / Impairments: Decreased functional mobility ; Decreased endurance;Decreased coordination;Decreased posture;Decreased ADL status; Decreased ROM; Decreased strength;Decreased balance;Decreased vision/visual deficit; Decreased high-level IADLs;Decreased safe awareness;Decreased cognition;Decreased fine motor control  Assessment: 81 y.o. male presents w/ the above deficits which are impacting his occupational performance. Pt would benefit from continued therapy during inpatient stay in order to maximize safety and independence upon discharge. Treatment Diagnosis: Multiple performance deficits s/p CVA  Prognosis: Fair  OT Education: OT Role;Plan of Care;ADL Adaptive Strategies;Transfer Training;Orientation; Energy Conservation  Patient Education: Pt is not an independent learner  Barriers to Learning: Cognition  REQUIRES OT FOLLOW UP: Yes  Activity Tolerance  Activity Tolerance: Patient Tolerated treatment well;Treatment limited secondary to decreased cognition;Patient limited by fatigue  Activity Tolerance: Pt continues to demo L lateral lean and pushing behavior requiring increased physical assistance to correct. Safety Devices  Safety Devices in place: Yes  Type of devices: All fall risk precautions in place;Call light within reach;Gait belt;Patient at risk for falls;Nurse notified; Left in bed;Bed alarm in place         Patient Diagnosis(es): There were no encounter diagnoses.       has a past medical history of Dementia ([facility] HTN (hypertension), Hyperlipidemia, and PONV (postoperative nausea and vomiting). has a past surgical history that includes Knee arthroscopy; Colonoscopy; eye surgery (Right, 11/2014); Cystoscopy (04/04/2018); and TURP (04/26/2018). Restrictions  Restrictions/Precautions  Restrictions/Precautions: Fall Risk, Modified Diet  Required Braces or Orthoses?: No  Position Activity Restriction  Other position/activity restrictions: 81year old male with a history of CAD, dementia, PVD who was admitted to [facility] on 6/19 with left-sided weakness. He was recently evaluated at this facility for an episode of syncope. Work-up revealed a right watershed ischemic stroke in the right frontal and parietal regions. He was initiated on Plavix in addition to aspirin and statin. A1c 5.3. LDL 88. Echo was unremarkable for an embolic source. He was evaluated by therapy and suggested to continue in an inpatient setting prior to returning home. He has no current complaints today. He is currently sitting up with therapy and having intermittent episodes of increased respirations with decreased heart rate. Subjective   General  Chart Reviewed: Yes  Patient assessed for rehabilitation services?: Yes  Response to previous treatment: Patient with no complaints from previous session  Family / Caregiver Present: No  Diagnosis: CVA  Subjective  Subjective: Pt seated in recliner chair finishing lunch w/ PCA upon arrival, pleasant and agreeable to OT/PT cotx      Objective    ADL  UE Bathing: Minimal assistance;Verbal cueing; Increased time to complete;Setup (min(A) for thoroughness; verbal cueing to make sure pt bathed all parts of UB, especially on L side)  LE Bathing: Maximum assistance;Verbal cueing;Setup (pt able to bathe upper portion of BLE with verbal cueing and assist for weight shift forward, however, assist to bathe lower portion of B legs and feet)  UE Dressing: Setup;Maximum assistance;Verbal cueing  LE Dressing: Dependent/Total (max(A)x2 to change brief this date)  Toileting: Dependent/Total (incontinent of urine, max(A)x2 for sunshine care and clothing management)  Additional Comments: Pt seated in recliner chair this date to complete ADLs. Max verbal cueing for posture and max(A)x1 to correct L lateral lean frequently throughout session. pt required increased verbal cueing and time to complete all ADLs d/t increased amount of time to process. Balance  Sitting Balance:  (max(A)x1 to correct L lateral lean while seated in recliner)  Standing Balance: Dependent/Total  Standing Balance  Time: 12 minutes total  Activity: functional mobility, functional transfers, ADL completion  Comment: Pt w/ significant L lateral lean, occasional pushing behavior significantly limiting upright posture and independence. Pt stood in standing frame for 10 mins requiring modA for weight shift onto R LE and completed the following tasks:  1) Reaching to target (cone) with LUE and crossing midline to stack cone on R side. 2x7 cones  2). body weight shifts to lean and hit balloon with x8 with L UE requiring increased time to process instructions and physical assistance to initiate LUE into air to hit balloon, pt able to successfully maintain LUE in air to hit balloon in a variety of different locations. Functional Mobility  Functional Mobility Comments: Pt unable to complete functional mobility at this time, transported to therapy gym with total asisstance in wheelchair  Bed mobility  Rolling to Left: Maximum assistance  Rolling to Right: Maximum assistance  Sit to Supine: Dependent/Total;2 Person assistance  Comment: cues for initiation and hand placement with rolling  Transfers  Sit to stand: Dependent/Total  Stand to sit: Dependent/Total  Transfer Comments: Use of sukhwinder for remainder of transfers.  Completed lateral scoot transfer from w/c to EOM with max(A)x2 and x1 stance from recliner for LB clothing management with max(A)x2  Cognition  Overall Cognitive Status: Exceptions  Arousal/Alertness: Delayed responses to stimuli  Following Commands: Follows one step commands with repetition; Follows one step commands with increased time  Attention Span: Attends with cues to redirect  Memory: Decreased recall of biographical Information;Decreased short term memory;Decreased long term memory;Decreased recall of recent events  Safety Judgement: Decreased awareness of need for safety;Decreased awareness of need for assistance  Problem Solving: Decreased awareness of errors;Assistance required to identify errors made;Assistance required to correct errors made  Insights: Decreased awareness of deficits  Initiation: Requires cues for all  Sequencing: Requires cues for all  Cognition Comment: Increased processing time for verbal commands  Perception  Overall Perceptual Status: Impaired  Unilateral Attention: Cues to maintain midline in sitting;Cues to attend to left side of body;Cues to maintain midline in standing  Initiation: Cues to initiate tasks (verbal cueing and increased time needed to initiate tasks)  Perseveration: Perseverates during ADLs        Plan   Plan  Times per week: 60 min; 5-7x  Times per day: Daily  Current Treatment Recommendations: Strengthening, Patient/Caregiver Education & Training, Home Management Training, Equipment Evaluation, Education, & procurement, Balance Training, Neuromuscular Re-education, Functional Mobility Training, Endurance Training, Safety Education & Training, Self-Care / ADL    G-Code     OutComes Score                                                  AM-PAC Score             Goals  Short term goals  Time Frame for Short term goals: 1-2 weeks  Short term goal 1: Functional transfer for ADL completion w/ assist x1-- not met, progressing  Short term goal 2: Bathing w/ assist x1-- not met, progressing  Short term goal 3: LB dressing w/ assist x1-- not met, progressing  Short term goal 4: Toileting w/ assist x1-- not met, progressing  Short term goal 5: UB dressing w/ Min A-- not met, progressing  Long term goals  Time Frame for Long term goals : 3-4 weeks  Long term goal 1: Functional transfer for ADL completion w/ supervision-- not met, progressing  Long term goal 2: Bathing w/ supervision-- not met, progressing  Long term goal 3: LB dressing w/ supervision-- not met, progressing  Long term goal 4: Toileting w/ supervision-- not met, progressing  Long term goal 5: UB dressing w/ supervision-- not met, progressing  Patient Goals   Patient goals :  To go home       Therapy Time   Individual Concurrent Group Co-treatment   Time In       3690   Time Out       1345   Minutes       60   Timed Code Treatment Minutes: Hector Angelo, OT   [place], [place] [id number]    Note corrected for accuracy by Tristen Packer OTR/L, MOT #[id number]

## 2021-07-09 NOTE — PROGRESS NOTES
Physical Therapy  Spoke with daughter, RACHNA, regarding custom wheelchair being ordered for patient. Provided contact information for wheelchair provider:  Duong Rajan at [facility] medical supply [phone].     Thank you,  Ivan Hopkins, PATRICA [id number]

## 2021-07-09 NOTE — CARE COORDINATION
JESSICA spoke with patient's daughter Alma Caballero regarding patient's discharge plan. Family Training/Meeting scheduled for 7/15/2021 at 8:30 AM.  Patient's daughter also reporting that she ordered patient an electric hospital bed, [employer], BSC, and shower chair. JESSICA informed Abhijit-PT. .JESSICA will continue to follow progress and update patient's discharge plan as needed.     Electronically signed by WING Cotton on 7/9/2021 at 1:06 PM

## 2021-07-09 NOTE — PLAN OF CARE
Problem: Falls - Risk of:  Goal: Will remain free from falls  Description: Will remain free from falls  7/9/2021 1052 by Urvashi Gay RN  Outcome: Ongoing  Note: Pt will be free from falls , pt aware of ARU policy on falls, will call for needs/assistance. Fall risk precautions in place, call light in reach, bed in lowest position, 2/2 bed rails up, bed wheels locked, bed side table within reach, bed alarm on, will continue to monitor. Problem: Falls - Risk of:  Goal: Absence of physical injury  Description: Absence of physical injury  7/9/2021 1052 by Urvashi Gay RN  Outcome: Ongoing  Note: Pt is free of injury. No injury noted. Fall precautions in place. Call light within reach. Will monitor. Problem: Skin Integrity:  Goal: Absence of new skin breakdown  Description: Absence of new skin breakdown  Outcome: Ongoing  Note: No new skin breakdown identified. Problem: Confusion - Acute:  Goal: Mental status will be restored to baseline  Description: Mental status will be restored to baseline  Outcome: Ongoing  Note: Patient mental status is not returned to baseline post stroke. He is working with speech therapy to work on his cognitive deficits. Problem: Injury - Risk of, Physical Injury:  Goal: Will remain free from falls  Description: Will remain free from falls  7/9/2021 1052 by Urvashi Gay RN  Outcome: Ongoing  Note: Pt will be free from falls , pt aware of ARU policy on falls, will call for needs/assistance. Fall risk precautions in place, call light in reach, bed in lowest position, 2/2 bed rails up, bed wheels locked, bed side table within reach, bed alarm on, will continue to monitor. Problem: Injury - Risk of, Physical Injury:  Goal: Absence of physical injury  Description: Absence of physical injury  7/9/2021 1052 by Urvashi Gay RN  Outcome: Ongoing  Note: Pt is free of injury. No injury noted. Fall precautions in place.  Call light within reach. Will monitor.        Problem: Mood - Altered:  Goal: Mood stable  Description: Mood stable  Outcome: Ongoing  Note: Patient is calm and cooperative

## 2021-07-09 NOTE — PROGRESS NOTES
Used: Extra wide bedside commode  Toilet Transfers Comments: Max A x2  Assessment        SLP                Body mass index is 29.08 kg/m². Assessment:  Patient Active Problem List   Diagnosis    Essential hypertension    Hyperlipidemia    Palpitations    Family history of premature CAD    Shoulder pain    Knee pain    Late onset [facility]'s disease without [facility] disturbance [facility]    Acute renal failure (ARF) (HCC)    Benign prostatic hyperplasia (BPH) with post-void dribbling    Near syncope    Dementia due to Alzheimer's disease ([facility]    Acute embolic stroke ([facility]       Plan:   Right frontal and parietal watershed infarcts: ASA, statin. Plavix for 21 days (7/10). PT/OT/SLP. Event monitor in place. Started Lexapro for motor recovery     Dysphagia: dysphagia diet, SLP    Intermittent tachypnea: HR decreased during episodes. Sats stable during episodes. Consider seizure activity vs central etiology as alternative options. Consulted pulm, appreciate assistance.      CAD: ASA, statin      HTN: atenolol, lisinopril 10 BID     Dementia: SLP     Urinary retention: flomax, proscar. PNA: suspect aspiration related. Levaquin completed     Bowels: Per protocol  Bladder: Per protocol   Sleep: Trazodone provided prn. Pain: tylenol, tramadol   DVT PPx: Lovenox   SANTIAGO: 7/16    Electronically signed by KELECHI Hoang CNP on 7/9/2021 at 8:08 AM    * This document was created using dictation software. While all precautions were taken to ensure accuracy, errors may have occurred. Please disregard any typographical errors. This patient has been seen, examined, and discussed with the nurse practitioner. I performed a face to face encounter with this patient on the above date. This note has been altered to reflect my own examination findings, impression, and recommendations.      Electronically signed by Sara Hale MD on 7/9/2021 at 1:25 PM

## 2021-07-09 NOTE — PROGRESS NOTES
Physical Therapy  Facility/Department: [facility] ACUTE REHAB UNIT  Daily Treatment Note  NAME: Vincenzo Yrok  : 1935  MRN: [id number]    Date of Service: 2021    Discharge Recommendations:  24 hour supervision or assist, Home with Home health PT, S Level 3   PT Equipment Recommendations  Equipment Needed: Yes  Other: TBD with progress. Currently, pt will require a sukhwinder lift and manual w/c    Assessment   Body structures, Functions, Activity limitations: Decreased functional mobility ; Decreased balance;Decreased coordination;Decreased endurance;Decreased strength;Decreased posture;Decreased ADL status; Decreased safe awareness;Decreased ROM  Assessment: Pt continues to require extensive assist of 2 for all functional mobility and ADL completion at this time with ongoing extensive (L) lateral lean + pushing in both seated and standing position. Pt remains non ambulatory secondary to poor standing and sitting balance. Pt seemed to be more cognitively alert at the begining of today's session, but decreased with fatigue. Some improvement was demonstrated with standing tolerance as pt was able to remain standing for 45 seconds requiring maxA of 1 in standing. Treatment Diagnosis: decreased balance, coordination, and functional mobiilty  Prognosis: Fair  Decision Making: Medium Complexity  Barriers to Learning: cognition  REQUIRES PT FOLLOW UP: Yes  Activity Tolerance  Activity Tolerance: Patient limited by endurance; Patient limited by cognitive status  Activity Tolerance: Pt had ongoing episodes of increased respiratory pattern with stare and decreased command following within therapy session. Lateral lean increases throughout session as pt fatigues. Patient Diagnosis(es): CVA     has a past medical history of Dementia ([facility] [address] HTN (hypertension), Hyperlipidemia, and PONV (postoperative nausea and vomiting). has a past surgical history that includes Knee arthroscopy;  Colonoscopy; eye surgery (Right, 11/2014); Cystoscopy (04/04/2018); and TURP (04/26/2018). Restrictions  Restrictions/Precautions  Restrictions/Precautions: Fall Risk, Modified Diet  Required Braces or Orthoses?: No  Position Activity Restriction  Other position/activity restrictions: 81year old male with a history of CAD, dementia, PVD who was admitted to [facility] on 6/19 with left-sided weakness. He was recently evaluated at this facility for an episode of syncope. Work-up revealed a right watershed ischemic stroke in the right frontal and parietal regions. He was initiated on Plavix in addition to aspirin and statin. A1c 5.3. LDL 88. Echo was unremarkable for an embolic source. He was evaluated by therapy and suggested to continue in an inpatient setting prior to returning home. He has no current complaints today. He is currently sitting up with therapy and having intermittent episodes of increased respirations with decreased heart rate. Subjective   General  Chart Reviewed: Yes  Additional Pertinent Hx: dementia, HTN, DM  Response To Previous Treatment: Patient with no complaints from previous session. Family / Caregiver Present: No  Referring Practitioner: Dr Satya Wolf  Subjective  Subjective: Pt in reclincer, eating lunch on arrival.  Agreeable to therapy services.   General Comment  Comments: Pt seated in recliner upon arrival.  Pain Screening  Patient Currently in Pain: Denies  Vital Signs  Patient Currently in Pain: Denies       Orientation     Cognition      Objective   Bed mobility  Rolling to Left: Maximum assistance  Rolling to Right: Maximum assistance  Transfers  Sit to Stand: 2 Person Assistance;Maximum Assistance  Stand to sit: 2 Person Assistance;Dependent/Total  Stand Pivot Transfers: 2 Person Assistance;Dependent/Total  Comment: extensive (L) lateral lean in both seated prep and standing transfer completion  Ambulation  Ambulation?: No  Stairs/Curb  Stairs?: No     Balance  Posture: Poor  Sitting - Static: Poor  Sitting - Dynamic: Poor;-  Standing - Static: Poor;-  Standing - Dynamic: Poor;-  Comments: consistent (L) lateral lean in all positions. Patient did better today to partially self correct posture in seated position. Requires max (A) to obtain and maintain midline position. Comment: Arrived in pts room with pt sitting in recliner. Performed ADL in recliner. Pt encouraged to use L UE to assist with ADL. Perfromed sit --> stand maxAx2, but pt tolerated standing for 45 seconds with maxAx1. Pt transfered from recliner to w/c with skylift. Performed squat pivot transfer from w/c to mat table with maxAx2. Pt sat on EOB x2 mins requiring modA to prevent excessive trunk extension and L tunk lean. Pt stood in standingframe x 10 mins, requiring modA for weight shift onto R LE. Pt reched for cones with UE x5, B and weightshifting to place cone on R side of body. Pt performed body weight shifts to lean and hit balloon with x8 with L UE. Pt required modA to weight shift and extended time to process commands that required use of L UE. Pt retruned to w/c and then transfered from w/c to bed with the use of skylift. Pt requried maxA to roll L and R in bed. Therapsit placed pillows behind L shoulder for proper alignment. Pt left with alarm on and all needs within reach.               G-Code     OutComes Score                                                     AM-PAC Score             Goals  Short term goals  Time Frame for Short term goals: 1-2 weeks  Short term goal 1: Pt will perform all bed mobility with maxA of 1  Short term goal 2: Pt will perform SPT with maxA of 1  Short term goal 3: Pt will ambulate 8' with LRAD with maxA of 1  Short term goal 4: Pt will negotiate 1 step with LRAD and maxA of 1  Long term goals  Time Frame for Long term goals : 2-3 weeks  Long term goal 1: Pt will perform all bed mobility with CGA  Long term goal 2: Pt will perform all transfers with CGA  Long term goal 3: Pt will

## 2021-07-09 NOTE — PROGRESS NOTES
ACUTE REHAB UNIT  SPEECH/LANGUAGE PATHOLOGY       [x] Daily  [] Weekly Care Conference Note  [] Discharge    Patient:Reno Rnodon Epp     :1935  [facility]:[id number]  Room #: CXE-6534/8521-74   Rehab Dx/Hx: Acute embolic stroke [facility]) [R53.8]  Date of Admit: 2021      Precautions: falls and aspirations  Home situation: Per pt's daughter Matt Osorio) pt lives with his grandson (22years old). Johan Stoddard reports plan is for her and her family to move into his place to provide more assistance. ST Dx: Mild-moderate oropharyngeal dysphagia; Severe cognitive linguistic deficits; Moderate dysarthria / dysphonia    Daily Treatment Info:   Date: 2021     Tx session 1   Pain Denied    Subjective     Pt sitting up in recliner chair for session. Minimal left sided push. Remained alert throughout session. Response times varied, easily distracted. Flat affect persists. Objective:  Goals    Pt will tolerate recommended diet and advanced trials with use of compensatory swallow strategies provided with < mod cues with no overt clinical s/s of aspiration or noted difficulty. No documented difficulty per chart review. Thin liquid trial / thermal stimulation of hot temperature   - Pt tolerated presentations in isolation, implemented small single sips with use of the hot temperature  - When presented with other textures, pt more impulsive and timing of initiation became more delayed, he demonstrated one instance of delayed cough     Pt consuming breakfast meal during session   - minimal response to max cues to minimize bite size, feeding rate and clearing mouth before taking another bite   - pt's feeding pattern largely impacts his aspiration risk and ability to tolerate thin liquids with meals      Pt will respond and/or initiate action upon verbal prompt within 3 seconds in 4 out of 5 opportunities without repetition.    Targeted via responsive naming task   - responded within 3 seconds on 50% of presentations (10/20)   - average response time for remaining presentations was 1 minute and 40 seconds and required a max of 5 repetitions      Pt will use visual aids/ strategies to state orientation to time, place, situation with 100% accuracy across 3 consecutive sessions. Oriented to all temporal concepts   - min cues for use of visual aid (digital clock)        Pt will improve oral motor function, articulatory precision and breath support in connected speech via graded tasks to >80% acc with use of speech strategies. Pt's speech was intelligible for all structured language tasks ; occasional - minimal raspy quality appears to be baseline. GOAL MET    Pt will improve verbal initiation and thought organization for extended utterances (i.e. related to personal hx/ recall of recent events, etc.) >75% acc. Responded to personal history questions   - 60% accuracy (6/10)   - responses were limited in utterance length due to structure of presentations   - despite accurate responses to temporal orientation questions, pt continues to respond to personal questions from different time periods (living with wife, only 2/4 children, living siblings, still working)     Patient will complete fx problem solving tasks and demonstrate insight into limitations and impact on daily functioning with >75% acc, min cues. Comprehension of medications   - pt dependent for comprehension of current medications and rationale for medication list   - provided written med list with rationale and reviewed after a delay; pt able to read accurately and demonstrated improved comprehension on 3rd attempt     Pt able to generate \"stroke\" on 2/3 attempts; Pt required mod-max cues for generating side effects of stroke on 3/3 attempts      Other areas targeted:      Education:   Provided education re rationale for treatment and plan of care. Pt with limited comprehension, requires ongoing, simplified education.       Safety Devices: [x] Call light within reach  [x] Chair alarm activated  [] Bed alarm activated  [x] Other: camera in room     Assessment:   Speech Therapy Diagnosis  Cognitive Diagnosis: Pt presents with severe cognitive linguistic deficits due to reduced orienation, reduced processing speed, reduced insight into current situation, reduced immediate/ working/ short term memory with fx problem solving difficulty. Per chart review pt with baseline dementia, did not complete IADLs at home prior to CVA but was able to be safely left alone with daily check ins from family. Aphasia Diagnosis: Significantly reduced processing speed/ difficulty following multi-step commands. Unable to discern if due to pt being [place] vs reduced auditory comprehension/ initiation, requires further assessment. Pt c/o word finding diffiuclty, pt with minimal verbal output during evaluation, requires ongoing assessment for goal generation. Speech Diagnosis: Moderate dysarthria/ dysphonia, pt with intermittent periods of tachypnea and reduced breath support impacting intelligibility at the short phrase/ sentence level with reduced articulatory precision, pt with hoarse, gravely wet vocal quality    Current Diet Order: ADULT DIET; Dysphagia - Soft and Bite Sized; Mildly Thick (Nectar)            Plan:     Frequency:  5days/week   60 minutes/day    Discharge Recommendations:   Barriers: Pre-existing cognitive deficit; severity of deficits;  Limited initiation  Discharge Recommendations:  [] Home independently  [x] Home with assistance [x]  24 hour supervision  [] SNF [x] Other: TBD  Continued SLP Treatment:  [x] Yes [] No [] TBD based on progress while on ARU [] Vital Stim indicated [] Other:   Estimated discharge date: 7/16/21    Type of Total Treatment Minutes   Session 1     Time In 0800   Time Out 0900   Timed Code Minutes  45   Individual Treatment Minutes  60   Co-Treatment Minutes     Group Treatment Minutes     Concurrent Treatment Minutes       TOTAL DAILY MINUTES: 60    Electronically Signed by     Gonzales Yadav M.A.  CCC-SLP [facility] [id number]  Speech-Language Pathologist [phone]  7/9/2021 9:33 AM

## 2021-07-10 PROCEDURE — 1280000000 HC REHAB R&B

## 2021-07-10 PROCEDURE — 6370000000 HC RX 637 (ALT 250 FOR IP): Performed by: PHYSICAL MEDICINE & REHABILITATION

## 2021-07-10 PROCEDURE — 6360000002 HC RX W HCPCS: Performed by: PHYSICAL MEDICINE & REHABILITATION

## 2021-07-10 PROCEDURE — 6370000000 HC RX 637 (ALT 250 FOR IP): Performed by: NURSE PRACTITIONER

## 2021-07-10 RX ADMIN — ENOXAPARIN SODIUM 40 MG: 40 INJECTION SUBCUTANEOUS at 09:00

## 2021-07-10 RX ADMIN — ATORVASTATIN CALCIUM 40 MG: 40 TABLET, FILM COATED ORAL at 21:30

## 2021-07-10 RX ADMIN — LISINOPRIL 10 MG: 10 TABLET ORAL at 21:30

## 2021-07-10 RX ADMIN — ESCITALOPRAM OXALATE 5 MG: 10 TABLET ORAL at 09:00

## 2021-07-10 RX ADMIN — ASPIRIN 81 MG: 81 TABLET, CHEWABLE ORAL at 09:00

## 2021-07-10 RX ADMIN — TAMSULOSIN HYDROCHLORIDE 0.4 MG: 0.4 CAPSULE ORAL at 09:00

## 2021-07-10 RX ADMIN — CLOPIDOGREL BISULFATE 75 MG: 75 TABLET ORAL at 09:00

## 2021-07-10 RX ADMIN — LISINOPRIL 10 MG: 10 TABLET ORAL at 09:00

## 2021-07-10 RX ADMIN — FINASTERIDE 5 MG: 5 TABLET, FILM COATED ORAL at 09:00

## 2021-07-10 RX ADMIN — ATENOLOL 25 MG: 50 TABLET ORAL at 09:00

## 2021-07-10 ASSESSMENT — PAIN SCALES - GENERAL
PAINLEVEL_OUTOF10: 0

## 2021-07-10 NOTE — PLAN OF CARE
Incont of bowel in addition to bladder, scant amount of urine in urinal when attempted. Changed sticker for heart monitor (chest monitor at 61% charged, phone that goes w/ it plugged in. Monitoring Bp (checked twice), lisinopril started recently    Problem: Falls - Risk of:  Goal: Will remain free from falls  Description: Will remain free from falls  Outcome: Ongoing  Note: Education Provided as followed:  Family/Patient made aware of the tailored interventions falls plan using the TIPS TOOL. Goal: Absence of physical injury  Description: Absence of physical injury  Outcome: Ongoing     Problem: Skin Integrity:  Goal: Will show no infection signs and symptoms  Description: Will show no infection signs and symptoms  Outcome: Ongoing  Goal: Absence of new skin breakdown  Description: Absence of new skin breakdown  Outcome: Ongoing     Problem: Confusion - Acute:  Goal: Absence of continued neurological deterioration signs and symptoms  Description: Absence of continued neurological deterioration signs and symptoms  Outcome: Ongoing  Goal: Mental status will be restored to baseline  Description: Mental status will be restored to baseline  Outcome: Ongoing     Problem: Discharge Planning:  Goal: Ability to perform activities of daily living will improve  Description: Ability to perform activities of daily living will improve  Outcome: Ongoing  Goal: Participates in care planning  Description: Participates in care planning  Outcome: Ongoing     Problem: Injury - Risk of, Physical Injury:  Goal: Will remain free from falls  Description: Will remain free from falls  Outcome: Ongoing  Note: Education Provided as followed:  Family/Patient made aware of the tailored interventions falls plan using the TIPS TOOL.   Goal: Absence of physical injury  Description: Absence of physical injury  Outcome: Ongoing     Problem: Mood - Altered:  Goal: Mood stable  Description: Mood stable  Outcome: Ongoing  Goal: Absence of abusive behavior  Description: Absence of abusive behavior  Outcome: Ongoing  Goal: Verbalizations of feeling emotionally comfortable while being cared for will increase  Description: Verbalizations of feeling emotionally comfortable while being cared for will increase  Outcome: Ongoing     Problem: Psychomotor Activity - Altered:  Goal: Absence of psychomotor disturbance signs and symptoms  Description: Absence of psychomotor disturbance signs and symptoms  Outcome: Ongoing     Problem: Sensory Perception - Impaired:  Goal: Demonstrations of improved sensory functioning will increase  Description: Demonstrations of improved sensory functioning will increase  Outcome: Ongoing  Goal: Decrease in sensory misperception frequency  Description: Decrease in sensory misperception frequency  Outcome: Ongoing  Goal: Able to refrain from responding to false sensory perceptions  Description: Able to refrain from responding to false sensory perceptions  Outcome: Ongoing  Goal: Demonstrates accurate environmental perceptions  Description: Demonstrates accurate environmental perceptions  Outcome: Ongoing  Goal: Able to distinguish between reality-based and nonreality-based thinking  Description: Able to distinguish between reality-based and nonreality-based thinking  Outcome: Ongoing  Goal: Able to interrupt nonreality-based thinking  Description: Able to interrupt nonreality-based thinking  Outcome: Ongoing     Problem: Sleep Pattern Disturbance:  Goal: Appears well-rested  Description: Appears well-rested  Outcome: Ongoing     Problem: IP BLADDER/VOIDING  Goal: LTG - Patient will utilize adaptive techniques/equipment to complete bladder elimination  Outcome: Ongoing  Goal: STG - Patient demonstrates no accidents  Outcome: Ongoing  Goal: STG - Patient will state signs and symptoms of UTI  Outcome: Ongoing  Goal: STG - patient will be able to empty bladder  Outcome: Ongoing     Problem: IP BOWEL ELIMINATION  Goal: LTG - patient will have regular and routine bowel evacuation  Outcome: Ongoing     Problem: Neurological  Goal: Maximum potential motor/sensory/cognitive function  Outcome: Ongoing

## 2021-07-10 NOTE — PLAN OF CARE
Education Provided as followed:  \"Family/Patient made aware of the tailored interventions falls plan using the TIPS TOOL. Problem: Falls - Risk of:  Goal: Will remain free from falls  Description: Will remain free from falls  7/10/2021 1024 by Ana Rosa Leal RN  Outcome: Ongoing  7/10/2021 0115 by Davonte Schafer RN  Outcome: Ongoing  Note: Education Provided as followed:  Family/Patient made aware of the tailored interventions falls plan using the TIPS TOOL.   Goal: Absence of physical injury  Description: Absence of physical injury  7/10/2021 1024 by Ana Rosa Leal RN  Outcome: Ongoing  7/10/2021 0115 by Davonte Schafer RN  Outcome: Ongoing     Problem: Skin Integrity:  Goal: Will show no infection signs and symptoms  Description: Will show no infection signs and symptoms  7/10/2021 1024 by Ana Rosa Leal RN  Outcome: Ongoing  7/10/2021 0115 by Davonte Schafer RN  Outcome: Ongoing  Goal: Absence of new skin breakdown  Description: Absence of new skin breakdown  7/10/2021 1024 by Ana Rosa Leal RN  Outcome: Ongoing  7/10/2021 0115 by Davonte Schafer RN  Outcome: Ongoing     Problem: Confusion - Acute:  Goal: Absence of continued neurological deterioration signs and symptoms  Description: Absence of continued neurological deterioration signs and symptoms  7/10/2021 1024 by Ana Rosa Leal RN  Outcome: Ongoing  7/10/2021 0115 by Davonte Schafer RN  Outcome: Ongoing  Goal: Mental status will be restored to baseline  Description: Mental status will be restored to baseline  7/10/2021 1024 by Ana Rosa Leal RN  Outcome: Ongoing  7/10/2021 0115 by Davonte Schafer RN  Outcome: Ongoing     Problem: Discharge Planning:  Goal: Ability to perform activities of daily living will improve  Description: Ability to perform activities of daily living will improve  7/10/2021 1024 by Ana Rosa Leal RN  Outcome: Ongoing  7/10/2021 0115 by Davonte Schafer RN  Outcome: Ongoing  Goal: Participates in care Ongoing  7/10/2021 0115 by Carlos Cordoba RN  Outcome: Ongoing  Goal: Decrease in sensory misperception frequency  Description: Decrease in sensory misperception frequency  7/10/2021 1024 by Pipe Gillis RN  Outcome: Ongoing  7/10/2021 0115 by Carlos Cordoba RN  Outcome: Ongoing  Goal: Able to refrain from responding to false sensory perceptions  Description: Able to refrain from responding to false sensory perceptions  7/10/2021 1024 by Pipe Gillis RN  Outcome: Ongoing  7/10/2021 0115 by Carlos Cordoba RN  Outcome: Ongoing  Goal: Demonstrates accurate environmental perceptions  Description: Demonstrates accurate environmental perceptions  7/10/2021 1024 by Pipe Gillis RN  Outcome: Ongoing  7/10/2021 0115 by Carlos Cordoba RN  Outcome: Ongoing  Goal: Able to distinguish between reality-based and nonreality-based thinking  Description: Able to distinguish between reality-based and nonreality-based thinking  7/10/2021 1024 by Pipe Gillis RN  Outcome: Ongoing  7/10/2021 0115 by Carlos Cordoba RN  Outcome: Ongoing  Goal: Able to interrupt nonreality-based thinking  Description: Able to interrupt nonreality-based thinking  7/10/2021 1024 by Pipe Gillis RN  Outcome: Ongoing  7/10/2021 0115 by Carlos Cordoba RN  Outcome: Ongoing     Problem: Sleep Pattern Disturbance:  Goal: Appears well-rested  Description: Appears well-rested  7/10/2021 1024 by Pipe Gillis RN  Outcome: Ongoing  7/10/2021 0115 by Carlos Cordoba RN  Outcome: Ongoing     Problem: IP BLADDER/VOIDING  Goal: LTG - Patient will utilize adaptive techniques/equipment to complete bladder elimination  7/10/2021 1024 by Pipe Gillis RN  Outcome: Ongoing  7/10/2021 0115 by Carlos Cordoba RN  Outcome: Ongoing  Goal: STG - Patient demonstrates no accidents  7/10/2021 1024 by Pipe Gillis RN  Outcome: Ongoing  7/10/2021 0115 by Carlos Cordoba RN  Outcome: Ongoing  Goal: STG - Patient will state signs and symptoms of UTI  7/10/2021 1024 by Claire Roche RN  Outcome: Ongoing  7/10/2021 0115 by Sabrina Bledsoe RN  Outcome: Ongoing  Goal: STG - patient will be able to empty bladder  7/10/2021 1024 by Claire Roche RN  Outcome: Ongoing  7/10/2021 0115 by Sabrina Bledsoe RN  Outcome: Ongoing     Problem: IP BOWEL ELIMINATION  Goal: LTG - patient will have regular and routine bowel evacuation  7/10/2021 1024 by Claire Roche RN  Outcome: Ongoing  7/10/2021 0115 by Sabrina Bledsoe RN  Outcome: Ongoing     Problem: Neurological  Goal: Maximum potential motor/sensory/cognitive function  7/10/2021 1024 by Claire Roche RN  Outcome: Ongoing  7/10/2021 0115 by Sabrina Bledsoe RN  Outcome: Ongoing

## 2021-07-11 PROCEDURE — 1280000000 HC REHAB R&B

## 2021-07-11 PROCEDURE — 6370000000 HC RX 637 (ALT 250 FOR IP): Performed by: PHYSICAL MEDICINE & REHABILITATION

## 2021-07-11 PROCEDURE — 6370000000 HC RX 637 (ALT 250 FOR IP): Performed by: NURSE PRACTITIONER

## 2021-07-11 PROCEDURE — 6360000002 HC RX W HCPCS: Performed by: PHYSICAL MEDICINE & REHABILITATION

## 2021-07-11 RX ADMIN — ESCITALOPRAM OXALATE 5 MG: 10 TABLET ORAL at 08:40

## 2021-07-11 RX ADMIN — ASPIRIN 81 MG: 81 TABLET, CHEWABLE ORAL at 08:40

## 2021-07-11 RX ADMIN — TRAZODONE HYDROCHLORIDE 50 MG: 50 TABLET ORAL at 21:02

## 2021-07-11 RX ADMIN — ATENOLOL 25 MG: 50 TABLET ORAL at 08:40

## 2021-07-11 RX ADMIN — TAMSULOSIN HYDROCHLORIDE 0.4 MG: 0.4 CAPSULE ORAL at 08:40

## 2021-07-11 RX ADMIN — ATORVASTATIN CALCIUM 40 MG: 40 TABLET, FILM COATED ORAL at 20:54

## 2021-07-11 RX ADMIN — LISINOPRIL 10 MG: 10 TABLET ORAL at 08:40

## 2021-07-11 RX ADMIN — FINASTERIDE 5 MG: 5 TABLET, FILM COATED ORAL at 08:40

## 2021-07-11 RX ADMIN — ACETAMINOPHEN 650 MG: 325 TABLET ORAL at 21:02

## 2021-07-11 RX ADMIN — ENOXAPARIN SODIUM 40 MG: 40 INJECTION SUBCUTANEOUS at 08:40

## 2021-07-11 RX ADMIN — LISINOPRIL 10 MG: 10 TABLET ORAL at 20:54

## 2021-07-11 ASSESSMENT — PAIN DESCRIPTION - LOCATION: LOCATION: GENERALIZED

## 2021-07-11 ASSESSMENT — PAIN SCALES - GENERAL
PAINLEVEL_OUTOF10: 0
PAINLEVEL_OUTOF10: 0
PAINLEVEL_OUTOF10: 4
PAINLEVEL_OUTOF10: 1

## 2021-07-11 ASSESSMENT — PAIN DESCRIPTION - ONSET: ONSET: ON-GOING

## 2021-07-11 ASSESSMENT — PAIN DESCRIPTION - PAIN TYPE: TYPE: ACUTE PAIN

## 2021-07-11 NOTE — PLAN OF CARE
Education Provided as followed:  \"Family/Patient made aware of the tailored interventions falls plan using the TIPS TOOL. Problem: Falls - Risk of:  Goal: Will remain free from falls  Description: Will remain free from falls  7/11/2021 1003 by Margarette Horne RN  Outcome: Ongoing  7/11/2021 0220 by Leita Gaucher, RN  Outcome: Ongoing  Note: Education Provided as followed:  Family/Patient made aware of the tailored interventions falls plan using the TIPS TOOL.   Goal: Absence of physical injury  Description: Absence of physical injury  7/11/2021 1003 by Margarette Horne RN  Outcome: Ongoing  7/11/2021 0220 by Leita Gaucher, RN  Outcome: Ongoing     Problem: Skin Integrity:  Goal: Will show no infection signs and symptoms  Description: Will show no infection signs and symptoms  7/11/2021 1003 by Margarette Horne RN  Outcome: Ongoing  7/11/2021 0220 by Leita Gaucher, RN  Outcome: Ongoing  Goal: Absence of new skin breakdown  Description: Absence of new skin breakdown  7/11/2021 1003 by Margarette Horne RN  Outcome: Ongoing  7/11/2021 0220 by Leita Gaucher, RN  Outcome: Ongoing     Problem: Confusion - Acute:  Goal: Absence of continued neurological deterioration signs and symptoms  Description: Absence of continued neurological deterioration signs and symptoms  7/11/2021 1003 by Margarette Horne RN  Outcome: Ongoing  7/11/2021 0220 by Leita Gaucher, RN  Outcome: Ongoing  Goal: Mental status will be restored to baseline  Description: Mental status will be restored to baseline  7/11/2021 1003 by Margarette Horne RN  Outcome: Ongoing  7/11/2021 0220 by Leita Gaucher, RN  Outcome: Ongoing     Problem: Discharge Planning:  Goal: Ability to perform activities of daily living will improve  Description: Ability to perform activities of daily living will improve  7/11/2021 1003 by Margarette Horne RN  Outcome: Ongoing  7/11/2021 0220 by Leita Gaucher, RN  Outcome: Ongoing  Goal: Participates in care planning  Description: Participates in care planning  7/11/2021 1003 by Margarette Horne RN  Outcome: Ongoing  7/11/2021 0220 by Leita Gaucher, RN  Outcome: Ongoing     Problem: Injury - Risk of, Physical Injury:  Goal: Will remain free from falls  Description: Will remain free from falls  7/11/2021 1003 by Margarette Horne RN  Outcome: Ongoing  7/11/2021 0220 by Leita Gaucher, RN  Outcome: Ongoing  Note: Education Provided as followed:  Family/Patient made aware of the tailored interventions falls plan using the TIPS TOOL.   Goal: Absence of physical injury  Description: Absence of physical injury  7/11/2021 1003 by Margarette Horne RN  Outcome: Ongoing  7/11/2021 0220 by Leita Gaucher, RN  Outcome: Ongoing     Problem: Mood - Altered:  Goal: Mood stable  Description: Mood stable  7/11/2021 1003 by Margarette Horne RN  Outcome: Ongoing  7/11/2021 0220 by Leita Gaucher, RN  Outcome: Ongoing  Goal: Absence of abusive behavior  Description: Absence of abusive behavior  7/11/2021 1003 by Margarette Horne RN  Outcome: Ongoing  7/11/2021 0220 by Leita Gaucher, RN  Outcome: Ongoing  Goal: Verbalizations of feeling emotionally comfortable while being cared for will increase  Description: Verbalizations of feeling emotionally comfortable while being cared for will increase  7/11/2021 1003 by Margarette Horne RN  Outcome: Ongoing  7/11/2021 0220 by Leita Gaucher, RN  Outcome: Ongoing     Problem: Psychomotor Activity - Altered:  Goal: Absence of psychomotor disturbance signs and symptoms  Description: Absence of psychomotor disturbance signs and symptoms  7/11/2021 1003 by Margarette Horne RN  Outcome: Ongoing  7/11/2021 0220 by Leita Gaucher, RN  Outcome: Ongoing     Problem: Sensory Perception - Impaired:  Goal: Demonstrations of improved sensory functioning will increase  Description: Demonstrations of improved sensory functioning will increase  7/11/2021 1003 by Margarette Horne RN  Outcome: Ongoing  7/11/2021 0220 by Kaitlin Caba RN  Outcome: Ongoing  Goal: Decrease in sensory misperception frequency  Description: Decrease in sensory misperception frequency  7/11/2021 1003 by Ayo Todd RN  Outcome: Ongoing  7/11/2021 0220 by Kaitlin Caba RN  Outcome: Ongoing  Goal: Able to refrain from responding to false sensory perceptions  Description: Able to refrain from responding to false sensory perceptions  7/11/2021 1003 by Ayo Todd RN  Outcome: Ongoing  7/11/2021 0220 by Kaitlin Caba RN  Outcome: Ongoing  Goal: Demonstrates accurate environmental perceptions  Description: Demonstrates accurate environmental perceptions  7/11/2021 1003 by Ayo Todd RN  Outcome: Ongoing  7/11/2021 0220 by Kaitlin Caba RN  Outcome: Ongoing  Goal: Able to distinguish between reality-based and nonreality-based thinking  Description: Able to distinguish between reality-based and nonreality-based thinking  7/11/2021 1003 by Ayo Todd RN  Outcome: Ongoing  7/11/2021 0220 by Kaitlin Caba RN  Outcome: Ongoing  Goal: Able to interrupt nonreality-based thinking  Description: Able to interrupt nonreality-based thinking  7/11/2021 1003 by Ayo Todd RN  Outcome: Ongoing  7/11/2021 0220 by Kaitlin Caba RN  Outcome: Ongoing     Problem: Sleep Pattern Disturbance:  Goal: Appears well-rested  Description: Appears well-rested  7/11/2021 1003 by Ayo Todd RN  Outcome: Ongoing  7/11/2021 0220 by Kaitlin Caba RN  Outcome: Ongoing     Problem: IP BLADDER/VOIDING  Goal: LTG - Patient will utilize adaptive techniques/equipment to complete bladder elimination  7/11/2021 1003 by Ayo Todd RN  Outcome: Ongoing  7/11/2021 0220 by Kaitlin Caba RN  Outcome: Ongoing  Goal: STG - Patient demonstrates no accidents  7/11/2021 1003 by Ayo Todd RN  Outcome: Ongoing  7/11/2021 0220 by Kaitlin Caba RN  Outcome: Ongoing  Goal: STG - Patient will state signs and symptoms of UTI  7/11/2021 1003 by Suleiman Gary RN  Outcome: Ongoing  7/11/2021 0220 by Allen Barnard RN  Outcome: Ongoing  Goal: STG - patient will be able to empty bladder  7/11/2021 1003 by Suleiman Gary RN  Outcome: Ongoing  7/11/2021 0220 by Allen Barnard RN  Outcome: Ongoing     Problem: IP BOWEL ELIMINATION  Goal: LTG - patient will have regular and routine bowel evacuation  7/11/2021 1003 by Suleiman Gary RN  Outcome: Ongoing  7/11/2021 0220 by Allen Barnard RN  Outcome: Ongoing     Problem: Neurological  Goal: Maximum potential motor/sensory/cognitive function  7/11/2021 1003 by Suleiman Gary RN  Outcome: Ongoing  7/11/2021 0220 by Allen Barnard RN  Outcome: Ongoing

## 2021-07-11 NOTE — PLAN OF CARE
Continence w/ urinal improving. Continues to be incontinent of bowel, small amounts a couple times a day. Reviewed mouth care. Physically practiced but poor recall on pneumonia prevention education    Problem: Falls - Risk of:  Goal: Will remain free from falls  Description: Will remain free from falls  Outcome: Ongoing  Note: Education Provided as followed:  Family/Patient made aware of the tailored interventions falls plan using the TIPS TOOL. Goal: Absence of physical injury  Description: Absence of physical injury  Outcome: Ongoing     Problem: Skin Integrity:  Goal: Will show no infection signs and symptoms  Description: Will show no infection signs and symptoms  Outcome: Ongoing  Goal: Absence of new skin breakdown  Description: Absence of new skin breakdown  Outcome: Ongoing     Problem: Confusion - Acute:  Goal: Absence of continued neurological deterioration signs and symptoms  Description: Absence of continued neurological deterioration signs and symptoms  Outcome: Ongoing  Goal: Mental status will be restored to baseline  Description: Mental status will be restored to baseline  Outcome: Ongoing     Problem: Discharge Planning:  Goal: Ability to perform activities of daily living will improve  Description: Ability to perform activities of daily living will improve  Outcome: Ongoing  Goal: Participates in care planning  Description: Participates in care planning  Outcome: Ongoing     Problem: Injury - Risk of, Physical Injury:  Goal: Will remain free from falls  Description: Will remain free from falls  Outcome: Ongoing  Note: Education Provided as followed:  Family/Patient made aware of the tailored interventions falls plan using the TIPS TOOL.   Goal: Absence of physical injury  Description: Absence of physical injury  Outcome: Ongoing     Problem: Mood - Altered:  Goal: Mood stable  Description: Mood stable  Outcome: Ongoing  Goal: Absence of abusive behavior  Description: Absence of abusive behavior  Outcome: Ongoing  Goal: Verbalizations of feeling emotionally comfortable while being cared for will increase  Description: Verbalizations of feeling emotionally comfortable while being cared for will increase  Outcome: Ongoing     Problem: Psychomotor Activity - Altered:  Goal: Absence of psychomotor disturbance signs and symptoms  Description: Absence of psychomotor disturbance signs and symptoms  Outcome: Ongoing     Problem: Sensory Perception - Impaired:  Goal: Demonstrations of improved sensory functioning will increase  Description: Demonstrations of improved sensory functioning will increase  Outcome: Ongoing  Goal: Decrease in sensory misperception frequency  Description: Decrease in sensory misperception frequency  Outcome: Ongoing  Goal: Able to refrain from responding to false sensory perceptions  Description: Able to refrain from responding to false sensory perceptions  Outcome: Ongoing  Goal: Demonstrates accurate environmental perceptions  Description: Demonstrates accurate environmental perceptions  Outcome: Ongoing  Goal: Able to distinguish between reality-based and nonreality-based thinking  Description: Able to distinguish between reality-based and nonreality-based thinking  Outcome: Ongoing  Goal: Able to interrupt nonreality-based thinking  Description: Able to interrupt nonreality-based thinking  Outcome: Ongoing     Problem: Sleep Pattern Disturbance:  Goal: Appears well-rested  Description: Appears well-rested  Outcome: Ongoing     Problem: IP BLADDER/VOIDING  Goal: LTG - Patient will utilize adaptive techniques/equipment to complete bladder elimination  Outcome: Ongoing  Goal: STG - Patient demonstrates no accidents  Outcome: Ongoing  Goal: STG - Patient will state signs and symptoms of UTI  Outcome: Ongoing  Goal: STG - patient will be able to empty bladder  Outcome: Ongoing     Problem: IP BOWEL ELIMINATION  Goal: LTG - patient will have regular and routine bowel evacuation  Outcome: Ongoing     Problem: Neurological  Goal: Maximum potential motor/sensory/cognitive function  Outcome: Ongoing

## 2021-07-12 LAB
ANION GAP SERPL CALCULATED.3IONS-SCNC: 8 MMOL/L (ref 3–16)
BUN BLDV-MCNC: 13 MG/DL (ref 7–20)
CALCIUM SERPL-MCNC: 8.8 MG/DL (ref 8.3–10.6)
CHLORIDE BLD-SCNC: 106 MMOL/L (ref 99–110)
CO2: 26 MMOL/L (ref 21–32)
CREAT SERPL-MCNC: 0.9 MG/DL (ref 0.8–1.3)
GFR AFRICAN AMERICAN: >60
GFR NON-AFRICAN AMERICAN: >60
GLUCOSE BLD-MCNC: 94 MG/DL (ref 70–99)
HCT VFR BLD CALC: 37.8 % (ref 40.5–52.5)
HEMOGLOBIN: 12.5 G/DL (ref 13.5–17.5)
MCH RBC QN AUTO: 29.4 PG (ref 26–34)
MCHC RBC AUTO-ENTMCNC: 33.1 G/DL (ref 31–36)
MCV RBC AUTO: 88.9 FL (ref 80–100)
PDW BLD-RTO: 14.6 % (ref 12.4–15.4)
PLATELET # BLD: 311 K/UL (ref 135–450)
PMV BLD AUTO: 8.4 FL (ref 5–10.5)
POTASSIUM SERPL-SCNC: 3.9 MMOL/L (ref 3.5–5.1)
RBC # BLD: 4.25 M/UL (ref 4.2–5.9)
SODIUM BLD-SCNC: 140 MMOL/L (ref 136–145)
WBC # BLD: 8.9 K/UL (ref 4–11)

## 2021-07-12 PROCEDURE — 36415 COLL VENOUS BLD VENIPUNCTURE: CPT

## 2021-07-12 PROCEDURE — 80048 BASIC METABOLIC PNL TOTAL CA: CPT

## 2021-07-12 PROCEDURE — 92526 ORAL FUNCTION THERAPY: CPT

## 2021-07-12 PROCEDURE — 6370000000 HC RX 637 (ALT 250 FOR IP): Performed by: PHYSICAL MEDICINE & REHABILITATION

## 2021-07-12 PROCEDURE — 97130 THER IVNTJ EA ADDL 15 MIN: CPT

## 2021-07-12 PROCEDURE — 6370000000 HC RX 637 (ALT 250 FOR IP): Performed by: NURSE PRACTITIONER

## 2021-07-12 PROCEDURE — 97530 THERAPEUTIC ACTIVITIES: CPT

## 2021-07-12 PROCEDURE — 1280000000 HC REHAB R&B

## 2021-07-12 PROCEDURE — 97129 THER IVNTJ 1ST 15 MIN: CPT

## 2021-07-12 PROCEDURE — 85027 COMPLETE CBC AUTOMATED: CPT

## 2021-07-12 PROCEDURE — 97535 SELF CARE MNGMENT TRAINING: CPT

## 2021-07-12 PROCEDURE — 94760 N-INVAS EAR/PLS OXIMETRY 1: CPT

## 2021-07-12 PROCEDURE — 6360000002 HC RX W HCPCS: Performed by: PHYSICAL MEDICINE & REHABILITATION

## 2021-07-12 RX ADMIN — ATORVASTATIN CALCIUM 40 MG: 40 TABLET, FILM COATED ORAL at 22:18

## 2021-07-12 RX ADMIN — ATENOLOL 25 MG: 50 TABLET ORAL at 07:57

## 2021-07-12 RX ADMIN — LISINOPRIL 10 MG: 10 TABLET ORAL at 22:18

## 2021-07-12 RX ADMIN — ENOXAPARIN SODIUM 40 MG: 40 INJECTION SUBCUTANEOUS at 07:57

## 2021-07-12 RX ADMIN — TRAZODONE HYDROCHLORIDE 50 MG: 50 TABLET ORAL at 22:18

## 2021-07-12 RX ADMIN — TAMSULOSIN HYDROCHLORIDE 0.4 MG: 0.4 CAPSULE ORAL at 07:57

## 2021-07-12 RX ADMIN — ASPIRIN 81 MG: 81 TABLET, CHEWABLE ORAL at 07:58

## 2021-07-12 RX ADMIN — FINASTERIDE 5 MG: 5 TABLET, FILM COATED ORAL at 07:58

## 2021-07-12 RX ADMIN — LISINOPRIL 10 MG: 10 TABLET ORAL at 07:57

## 2021-07-12 RX ADMIN — ESCITALOPRAM OXALATE 5 MG: 10 TABLET ORAL at 07:58

## 2021-07-12 ASSESSMENT — PAIN SCALES - GENERAL
PAINLEVEL_OUTOF10: 0

## 2021-07-12 NOTE — PLAN OF CARE
Education Provided as followed:  \"Family/Patient made aware of the tailored interventions falls plan using the TIPS TOOL. Problem: Falls - Risk of:  Goal: Will remain free from falls  Description: Will remain free from falls  7/12/2021 1014 by Sabi Valenzuela RN  Outcome: Ongoing  7/12/2021 0055 by Franchesca Chaparro RN  Outcome: Ongoing  Note: Education Provided as followed:  Family/Patient made aware of the tailored interventions falls plan using the TIPS TOOL.   Goal: Absence of physical injury  Description: Absence of physical injury  7/12/2021 1014 by Sabi Valenzuela RN  Outcome: Ongoing  7/12/2021 0055 by Franchesca Chaparro RN  Outcome: Ongoing     Problem: Skin Integrity:  Goal: Will show no infection signs and symptoms  Description: Will show no infection signs and symptoms  7/12/2021 1014 by aSbi Valenzuela RN  Outcome: Ongoing  7/12/2021 0055 by Franchesca Chaparro RN  Outcome: Ongoing  Goal: Absence of new skin breakdown  Description: Absence of new skin breakdown  7/12/2021 1014 by Sabi Valenzuela RN  Outcome: Ongoing  7/12/2021 0055 by Franchesca Chaparro RN  Outcome: Ongoing     Problem: Confusion - Acute:  Goal: Absence of continued neurological deterioration signs and symptoms  Description: Absence of continued neurological deterioration signs and symptoms  7/12/2021 1014 by Sabi Valenzuela RN  Outcome: Ongoing  7/12/2021 0055 by Franchesca Chaparro RN  Outcome: Ongoing  Goal: Mental status will be restored to baseline  Description: Mental status will be restored to baseline  7/12/2021 1014 by Sabi Valenzuela RN  Outcome: Ongoing  7/12/2021 0055 by Franchesca Chaparro RN  Outcome: Ongoing     Problem: Discharge Planning:  Goal: Ability to perform activities of daily living will improve  Description: Ability to perform activities of daily living will improve  7/12/2021 1014 by Sabi Valenzuela RN  Outcome: Ongoing  7/12/2021 0055 by Franchesca Chaparro RN  Outcome: Ongoing  Goal: Participates in care planning  Description: Participates in care planning  7/12/2021 1014 by Manolo Huston RN  Outcome: Ongoing  7/12/2021 0055 by Cheng Arceo RN  Outcome: Ongoing     Problem: Injury - Risk of, Physical Injury:  Goal: Will remain free from falls  Description: Will remain free from falls  7/12/2021 1014 by Manolo Huston RN  Outcome: Ongoing  7/12/2021 0055 by Cheng Arceo RN  Outcome: Ongoing  Note: Education Provided as followed:  Family/Patient made aware of the tailored interventions falls plan using the TIPS TOOL.   Goal: Absence of physical injury  Description: Absence of physical injury  7/12/2021 1014 by Manolo Huston RN  Outcome: Ongoing  7/12/2021 0055 by Chneg Arceo RN  Outcome: Ongoing     Problem: Mood - Altered:  Goal: Mood stable  Description: Mood stable  7/12/2021 1014 by Manolo Huston RN  Outcome: Ongoing  7/12/2021 0055 by Cheng Arceo RN  Outcome: Ongoing  Goal: Absence of abusive behavior  Description: Absence of abusive behavior  7/12/2021 1014 by Manolo Huston RN  Outcome: Ongoing  7/12/2021 0055 by Cheng Arceo RN  Outcome: Ongoing  Goal: Verbalizations of feeling emotionally comfortable while being cared for will increase  Description: Verbalizations of feeling emotionally comfortable while being cared for will increase  7/12/2021 1014 by Manolo Huston RN  Outcome: Ongoing  7/12/2021 0055 by Cheng Arceo RN  Outcome: Ongoing     Problem: Psychomotor Activity - Altered:  Goal: Absence of psychomotor disturbance signs and symptoms  Description: Absence of psychomotor disturbance signs and symptoms  7/12/2021 1014 by Manolo Huston RN  Outcome: Ongoing  7/12/2021 0055 by Cheng Arceo RN  Outcome: Ongoing     Problem: Sensory Perception - Impaired:  Goal: Demonstrations of improved sensory functioning will increase  Description: Demonstrations of improved sensory functioning will increase  7/12/2021 1014 by Manolo Huston RN  Outcome: Ongoing  7/12/2021 0055 by Abdirizak Connolly RN  Outcome: Ongoing  Goal: Decrease in sensory misperception frequency  Description: Decrease in sensory misperception frequency  7/12/2021 1014 by Sparkle Harris RN  Outcome: Ongoing  7/12/2021 0055 by Abdirizak Connolly RN  Outcome: Ongoing  Goal: Able to refrain from responding to false sensory perceptions  Description: Able to refrain from responding to false sensory perceptions  7/12/2021 1014 by Sparkle Harris RN  Outcome: Ongoing  7/12/2021 0055 by Abdirizak Connolly RN  Outcome: Ongoing  Goal: Demonstrates accurate environmental perceptions  Description: Demonstrates accurate environmental perceptions  7/12/2021 1014 by Sparkle Harris RN  Outcome: Ongoing  7/12/2021 0055 by Abdirizak Connolly RN  Outcome: Ongoing  Goal: Able to distinguish between reality-based and nonreality-based thinking  Description: Able to distinguish between reality-based and nonreality-based thinking  7/12/2021 1014 by Sparkle Harris RN  Outcome: Ongoing  7/12/2021 0055 by Abdirizak Connolly RN  Outcome: Ongoing  Goal: Able to interrupt nonreality-based thinking  Description: Able to interrupt nonreality-based thinking  7/12/2021 1014 by Sparkle Harris RN  Outcome: Ongoing  7/12/2021 0055 by Abdirizak Connolly RN  Outcome: Ongoing     Problem: Sleep Pattern Disturbance:  Goal: Appears well-rested  Description: Appears well-rested  7/12/2021 1014 by Sparkle Harris RN  Outcome: Ongoing  7/12/2021 0055 by Abdirizak Connolly RN  Outcome: Ongoing     Problem: IP BLADDER/VOIDING  Goal: LTG - Patient will utilize adaptive techniques/equipment to complete bladder elimination  7/12/2021 1014 by Sparkle Harris RN  Outcome: Ongoing  7/12/2021 0055 by Abdirizak Connolly RN  Outcome: Ongoing  Goal: STG - Patient demonstrates no accidents  7/12/2021 1014 by Sparkle Harris RN  Outcome: Ongoing  7/12/2021 0055 by Abdirizak Connolly RN  Outcome: Ongoing  Goal: STG - Patient will state signs and symptoms of UTI  7/12/2021 1014 by Angel Scott RN  Outcome: Ongoing  7/12/2021 0055 by Collin Thompson RN  Outcome: Ongoing  Goal: STG - patient will be able to empty bladder  7/12/2021 1014 by Angel Scott RN  Outcome: Ongoing  7/12/2021 0055 by Collin Thompson RN  Outcome: Ongoing     Problem: IP BOWEL ELIMINATION  Goal: LTG - patient will have regular and routine bowel evacuation  7/12/2021 1014 by Angel Scott RN  Outcome: Ongoing  7/12/2021 0055 by Collin Thompson RN  Outcome: Ongoing     Problem: Neurological  Goal: Maximum potential motor/sensory/cognitive function  7/12/2021 1014 by Angel Scott RN  Outcome: Ongoing  7/12/2021 0055 by Collin Thompson RN  Outcome: Ongoing     Problem: Pain:  Description: Pain management should include both nonpharmacologic and pharmacologic interventions.   Goal: Pain level will decrease  Description: Pain level will decrease  7/12/2021 1014 by Agnel Scott RN  Outcome: Ongoing  7/12/2021 0055 by Collin Thompson RN  Outcome: Ongoing  Goal: Control of acute pain  Description: Control of acute pain  7/12/2021 1014 by Angel Scott RN  Outcome: Ongoing  7/12/2021 0055 by Collin Thompson RN  Outcome: Ongoing  Goal: Control of chronic pain  Description: Control of chronic pain  7/12/2021 1014 by Angel Scott RN  Outcome: Ongoing  7/12/2021 0055 by Collin Thompson RN  Outcome: Ongoing

## 2021-07-12 NOTE — PROGRESS NOTES
Alarm sounding in patients room. PCA entered the room  And noted patient sitting with buttocks on the floor in front of the recliner chair next to his bed in his assigned room. RN called to room for assistance. Patient assessed for injury and none noted at this time. 2 blankets and a sheet noted under patient that appears he was sitting on in the recliner. Patient stated he wanted to go back to his bed. RN and PCA assisted the patient with the maxisky back into bed w/o issue. This RN continued full assessment and noted no new issues with this said patient from morning assessment. Vitals WNL. Patient denies any pain. Physician and family made aware. No new orders at this time. Staff will monitor for latent injuries.

## 2021-07-12 NOTE — PATIENT CARE CONFERENCE
[facility]  Inpatient Rehabilitation  Weekly Team Conference Note    Patient Name: Juarez Turcios        MRN: [id number]    : 1935  (81 y.o.)  Gender: male   Referring Practitioner: Dr Lisa Uriostegui  Diagnosis: CVA    The team conference for this patient was held on 21 at 11:00am by:  Dorita Bose MD    CASE MANAGEMENT:  Assessment: SW spoke with patient's daughter Pennie Buerger regarding patient's discharge plan. Family Training/Meeting scheduled for 7/15/2021 at 8:30 AM.  Patient's daughter also reporting that she ordered patient an electric hospital bed, [employer], BSC, and shower chair. SW informed Abhijit-PT. SW will continue to follow progress and update patient's discharge plan as needed.     PHYSICAL THERAPY:    Transfers:  Sit to Stand: 2 Person Assistance, Maximum Assistance  Stand to sit: 2 Person Assistance, Dependent/Total  Bed to Chair: 2 Person Assistance, Dependent/Total    QM:  Roll Left and Right  Assistance Needed: Substantial/maximal assistance  Comment: pt able to help roll to the L  CARE Score: 2  Discharge Goal: Supervision or touching assistance  Sit to Lying  Assistance Needed: Dependent  CARE Score: 1  Discharge Goal: Supervision or touching assistance  Lying to Sitting on Side of Bed  Assistance Needed: Dependent  CARE Score: 1  Discharge Goal: Supervision or touching assistance  Sit to Stand  Assistance Needed: Dependent  CARE Score: 1  Discharge Goal: Supervision or touching assistance  Chair/Bed-to-Chair Transfer  Assistance Needed: Dependent  CARE Score: 1  Discharge Goal: Supervision or touching assistance  Car Transfer  Reason if not Attempted: Not attempted due to medical condition or safety concerns  CARE Score: 88  Discharge Goal: Supervision or touching assistance  Walk 10 Feet  Reason if not Attempted: Not attempted due to medical condition or safety concerns  CARE Score: 88  Discharge Goal: Supervision or touching assistance  Walk 50 Feet with Two cues for use of safe swallow strategies. Continue per POC.      OCCUPATIONAL THERAPY:    ADL:   ADL  Feeding: Supervision  Grooming: Setup, Minimal assistance (oral care while seated in w/c)  UE Bathing: Setup, Moderate assistance  LE Bathing: Setup, Maximum assistance  UE Dressing: Setup, Dependent/Total  LE Dressing: Setup, Dependent/Total  Toileting: Dependent/Total (incontinent of urine, max(A)x2 for sunshine care and clothing management)  Additional Comments: Completed shaving w/ pt seated upright in bed. Transferred to EOB for UB/LB sponge bathing, second person assist for sitting balance d/t significant posterior lean. Transitioned to supine to complete LB dressing. Vicki Palacio lifted to chair, completed oral care while seated at sink. Toilet Transfers:   Toilet Transfers  Toilet - Technique: Stand pivot  Equipment Used: Extra wide bedside commode  Toilet Transfer: Dependent/Total  Toilet Transfers Comments: Max A x2    QM:  Eating  Assistance Needed: Setup or clean-up assistance  Comment: decloned snack, drank 2 juices  CARE Score: 5  Discharge Goal: Independent  Oral Hygiene  Assistance Needed: Supervision or touching assistance  Comment: moutn swab - physical cues / motor memory  CARE Score: 4  Discharge Goal: Independent  Toileting Hygiene  Assistance Needed: Substantial/maximal assistance  Comment: patient held urinal once placed  CARE Score: 2  Discharge Goal: Supervision or touching assistance  Toilet Transfer  Assistance Needed: Dependent  CARE Score: 1  Shower/Bathe Self  Assistance Needed: Dependent  CARE Score: 1  Discharge Goal: Supervision or touching assistance  Upper Body Dressing  Assistance Needed: Dependent  CARE Score: 1  Discharge Goal: Independent  Lower Body Dressing  Assistance Needed: Dependent  CARE Score: 1  Discharge Goal: Set-up or clean-up assistance  Putting On/Taking Off Footwear  Assistance Needed: Dependent  CARE Score: 1  Discharge Goal: Set-up or clean-up assistance NUTRITION:  Weight: 226 lb 8 oz (102.7 kg) / Body mass index is 29.08 kg/m². Diet Order:  Dysphagia soft & bite-sized; mildly thick liquids    Supplements: n/a    Overall good po intake, most meals 50% or greater. Please see nutrition note for details. NURSING:    Risk for Readmission: 14%     [place] Fall Risk Score: 75  Wounds/Incisions/Ulcers: none   Medication Review: with patient   Pain: denies   Consultations/Labs/X-rays: CBC , every MON, THURS      BMP , every MON, THURS    Patient/Family Education provided by team:    Discharge Plan   Estimated Length of Stay:3 days  Destination: home health  Pass:No  Services at Discharge:  [facility] OT, PT S3  Equipment at Discharge:  Family reports having purchased all needed equipment, sukhwinder lift, hospital bed, manual w/c, ramp   Factors facilitating achievement of predicted outcomes: Family support, Cooperative and Pleasant  Barriers to the achievement of predicted outcomes: Decreased proprioception, Upper extremity weakness, Lower extremity weakness and Long standing deficits  Patient Goals: To go home,     Plan of Care  Interdisciplinary Individualized Plan of Care Review:    Continue Current Plan of Care:  Yes  Modifications:_____________________________    Rehab Team Members in attendance for Team Conference:  Brenda Chairez MSW, [address] [place] [address], RD, LD    Prachi Angeles OTR/LUIS Jasmine, PT, DPT    Fabiano Gonsales M.A., Haily Shore, RN    Phu Sidhu PT, DPT,     I approve the established interdisciplinary plan of care as documented within the medical record of Aaliyah [facility].     Kelly Alexis MD

## 2021-07-12 NOTE — PROGRESS NOTES
Dycem sheet placed in seat of recliner to assist in preventing patient from sliding from seat of chair in the future. AvaSure camera placed in room to assist in monitoring patient from nursing station.

## 2021-07-12 NOTE — PROGRESS NOTES
ACUTE REHAB UNIT  SPEECH/LANGUAGE PATHOLOGY       [x] Daily  [] Weekly Care Conference Note  [] Discharge    Patient:Reno Freeman     :1935  YXX:[id number]  Room #: IFA-6256/3466-66   Rehab Dx/Hx: Acute embolic stroke [facility]) [Z35.7]  Date of Admit: 2021      Precautions: falls and aspirations  Home situation: Per pt's daughter Taylor Duff) pt lives with his grandson (22years old). Pennie Buerger reports plan is for her and her family to move into his place to provide more assistance. ST Dx: Mild-moderate oropharyngeal dysphagia; Severe cognitive linguistic deficits; Moderate dysarthria / dysphonia    Daily Treatment Info:   Date: 2021     Tx session 1 Tx session 2   Pain Denied Denied   Subjective     Pt sitting upright in recliner chair for session. Pt seen holding maxi lift strap in left hand initially, required SLP to remove it from hand and place it to the side during session. Remained alert throughout session. Response times varied, easily distracted. Flat affect persists. Pt sitting reclined back in recliner chair upon SLP arrival. Remained alert throughout session. Response times varied, easily distracted. Flat affect persists majority of session but pt smiled once. Objective:  Goals     Pt will tolerate recommended diet and advanced trials with use of compensatory swallow strategies provided with < mod cues with no overt clinical s/s of aspiration or noted difficulty. Pt seen with breakfast meal. Pt consumed 5 bites of chopped pancakes and eggs taking large bites but placing fork down and reducing rate Independently with no overt clinical s/s of aspiration.  Pt combined thins with solids x 2 as he did not clear oral cavity prior to taking drink:    - (hot) thins via cup in isolation- pt seemingly tolerated 3/3 trials however slight increase in SOB noted with no immediate/ delayed overt s/s aspiration or change in vocal quality noted  - (hot) thins via cup in combination- pt seemingly tolerated 1/4 trials producing x 1 throat clear and x 2 immediate forceful coughing episodes (pt continues with reduce tolerance of thins when providing in combination with other textures). Required cue at end of session to produce throat clear and additional swallow due to wet vocal quality. * Max cues needed for ongoing initiation during breakfast meal to take another bite as pt would put fork down on plate and sit back in chair. Pt unable to recall use of compensatory swallow strategies or state with mod cues. Pt tolerated 3/3 drinks of thins (coffee) in isolation with mod cues to  coffee and drink it with no overt clinical s/s aspiration or noted difficulty. Cues included tactile (placing coffee in pt's hand) and verbal cue to drink coffee. Pt distracted by coffee cup in front of him initially picking up and placing back down again throughout session. Pt will respond and/or initiate action upon verbal prompt within 3 seconds in 4 out of 5 opportunities without repetition.   - Pt did not respond x 1 during session when asked basic conversational question  - Response times varied and pt needed repetition/ extended time to respond  Word Grid  Pt answered 17/28 (61%) independently within 3 seconds without repetition. Pt increased to 25/28 (89%) when given 2-4 more repetitions and with extended time. Pt had difficulty attending to task and needed verbal repetition of current question to cue to stay on task. Pt was also distracted by ongoing movement or objects in the room such as another SLP or a cup of coffee on table (moved 3 times throughout session without initiation to drink). Pt perseverated on one answer stating an animal name after 3 repetitions of question instead of name of boy. Pt will use visual aids/ strategies to state orientation to time, place, situation with 100% accuracy across 3 consecutive sessions.    Pt oriented to:  -time  -ABHINAV  -month  -year  -date (min cues to locate on whiteboard in room)  -hospital name    Pt  disoriented to:  -home situation after d/c  -d/c date in 3/3 trials   -situation (able to state given a field choice of 3)   Pt oriented to:  -time  -place  -situation  -d/c date initially   -current therapy     Pt disoriented to:  -upcoming d/c date 1/2 opp          Pt will improve oral motor function, articulatory precision and breath support in connected speech via graded tasks to >80% acc with use of speech strategies. GOAL MET  GOAL MET    Pt will improve verbal initiation and thought organization for extended utterances (i.e. related to personal hx/ recall of recent events, etc.) >75% acc. Pt continues answering yes/no questions with one word and not elaborating. Pt needs extended time to answer detailed questions that involve more than 1-2 utterances per answer.   - Pt responded to personal history questions with 60% accuracy (6/10). - Pt acknowledged only having 2 children   - Pt able to state past occupation and where he completed school/ residency Independently     Pt continues to answer with 1 word utterances and needs further cues to expand to 2 word utterances. - Able to state he has not received any other therapy yet this date besides speech  - Unable to recall seeing rehab NP this AM     STM recall (fx recall from conversation from previous session)   Pt able to state favorite operation and least favorite operation as discussed during previous ession      Patient will complete fx problem solving tasks and demonstrate insight into limitations and impact on daily functioning with >75% acc, min cues. When asked what to push if pt needs help, pt responded \"button\". Pt was further cued for color and he responded \"red\". - Pt Independently requesting need to urinate towards end of session, notified PCA. Goal not targeted this session. Other areas targeted:       Education:   Provided education re rationale for treatment and plan of care.  Pt has limited comprehension and requires ongoing simplified education. Provided education re rationale for treatment and plan of care. Pt with limited comprehension, requires ongoing, simplified education. Safety Devices: [x] Call light within reach  [x] Chair alarm activated  [] Bed alarm activated  [] Other:  [x] Call light within reach  [x] Chair alarm activated  [] Bed alarm activated  [] Other:      Assessment:   Speech Therapy Diagnosis  Cognitive Diagnosis: Pt presents with severe cognitive linguistic deficits due to reduced orienation, reduced processing speed, reduced insight into current situation, reduced immediate/ working/ short term memory with fx problem solving difficulty. Per chart review pt with baseline dementia, did not complete IADLs at home prior to CVA but was able to be safely left alone with daily check ins from family. Aphasia Diagnosis: Significantly reduced processing speed/ difficulty following multi-step commands. Unable to discern if due to pt being Kiana vs reduced auditory comprehension/ initiation, requires further assessment. Pt c/o word finding diffiuclty, pt with minimal verbal output during evaluation, requires ongoing assessment for goal generation. Speech Diagnosis: Moderate dysarthria/ dysphonia, pt with intermittent periods of tachypnea and reduced breath support impacting intelligibility at the short phrase/ sentence level with reduced articulatory precision, pt with hoarse, gravely wet vocal quality    Current Diet Order: ADULT DIET; Dysphagia - Soft and Bite Sized; Mildly Thick (Nectar)            Plan:     Frequency:  5days/week   60 minutes/day    Discharge Recommendations:   Barriers: Pre-existing cognitive deficit; severity of deficits;  Limited initiation  Discharge Recommendations:  [] Home independently  [x] Home with assistance [x]  24 hour supervision  [] SNF [x] Other: TBD  Continued SLP Treatment:  [x] Yes [] No [] TBD based on progress while on ARU [] Vital Stim indicated [] Other:   Estimated discharge date: 7/16/21    Type of Total Treatment Minutes   Session 1   Session 2   Time In 0800 1018   Time Out 0830 1048   Timed Code Minutes  15 25   Individual Treatment Minutes  30 30   Co-Treatment Minutes      Group Treatment Minutes      Concurrent Treatment Minutes        TOTAL DAILY MINUTES:  61    Electronically Signed by     Nallely BOOGIE CCC-SLP #[id number] 7/12/2021 12:02 PM  Speech-Language Pathologist    The speech-language pathologist was present, directed the patient's care, made skilled judgment and was responsible for assessment and treatment.     Scooter Rivas Floor.,  Speech-Language Pathology

## 2021-07-12 NOTE — PLAN OF CARE
Took tylenol & trazadone when offered at bedtime. Slept soundly. Still having stool smears. Problem: Falls - Risk of:  Goal: Will remain free from falls  Description: Will remain free from falls  Outcome: Ongoing  Note: Education Provided as followed:  Family/Patient made aware of the tailored interventions falls plan using the TIPS TOOL. Goal: Absence of physical injury  Description: Absence of physical injury  Outcome: Ongoing     Problem: Skin Integrity:  Goal: Will show no infection signs and symptoms  Description: Will show no infection signs and symptoms  Outcome: Ongoing  Goal: Absence of new skin breakdown  Description: Absence of new skin breakdown  Outcome: Ongoing     Problem: Confusion - Acute:  Goal: Absence of continued neurological deterioration signs and symptoms  Description: Absence of continued neurological deterioration signs and symptoms  Outcome: Ongoing  Goal: Mental status will be restored to baseline  Description: Mental status will be restored to baseline  Outcome: Ongoing     Problem: Discharge Planning:  Goal: Ability to perform activities of daily living will improve  Description: Ability to perform activities of daily living will improve  Outcome: Ongoing  Goal: Participates in care planning  Description: Participates in care planning  Outcome: Ongoing     Problem: Injury - Risk of, Physical Injury:  Goal: Will remain free from falls  Description: Will remain free from falls  Outcome: Ongoing  Note: Education Provided as followed:  Family/Patient made aware of the tailored interventions falls plan using the TIPS TOOL.   Goal: Absence of physical injury  Description: Absence of physical injury  Outcome: Ongoing     Problem: Mood - Altered:  Goal: Mood stable  Description: Mood stable  Outcome: Ongoing  Goal: Absence of abusive behavior  Description: Absence of abusive behavior  Outcome: Ongoing  Goal: Verbalizations of feeling emotionally comfortable while being cared for will increase  Description: Verbalizations of feeling emotionally comfortable while being cared for will increase  Outcome: Ongoing     Problem: Psychomotor Activity - Altered:  Goal: Absence of psychomotor disturbance signs and symptoms  Description: Absence of psychomotor disturbance signs and symptoms  Outcome: Ongoing     Problem: Sensory Perception - Impaired:  Goal: Demonstrations of improved sensory functioning will increase  Description: Demonstrations of improved sensory functioning will increase  Outcome: Ongoing  Goal: Decrease in sensory misperception frequency  Description: Decrease in sensory misperception frequency  Outcome: Ongoing  Goal: Able to refrain from responding to false sensory perceptions  Description: Able to refrain from responding to false sensory perceptions  Outcome: Ongoing  Goal: Demonstrates accurate environmental perceptions  Description: Demonstrates accurate environmental perceptions  Outcome: Ongoing  Goal: Able to distinguish between reality-based and nonreality-based thinking  Description: Able to distinguish between reality-based and nonreality-based thinking  Outcome: Ongoing  Goal: Able to interrupt nonreality-based thinking  Description: Able to interrupt nonreality-based thinking  Outcome: Ongoing     Problem: Sleep Pattern Disturbance:  Goal: Appears well-rested  Description: Appears well-rested  Outcome: Ongoing     Problem: IP BLADDER/VOIDING  Goal: LTG - Patient will utilize adaptive techniques/equipment to complete bladder elimination  Outcome: Ongoing  Goal: STG - Patient demonstrates no accidents  Outcome: Ongoing  Goal: STG - Patient will state signs and symptoms of UTI  Outcome: Ongoing  Goal: STG - patient will be able to empty bladder  Outcome: Ongoing     Problem: IP BOWEL ELIMINATION  Goal: LTG - patient will have regular and routine bowel evacuation  Outcome: Ongoing     Problem: Neurological  Goal: Maximum potential motor/sensory/cognitive function  Outcome: Ongoing     Problem: Pain:  Description: Pain management should include both nonpharmacologic and pharmacologic interventions.   Goal: Pain level will decrease  Description: Pain level will decrease  Outcome: Ongoing  Goal: Control of acute pain  Description: Control of acute pain  Outcome: Ongoing  Goal: Control of chronic pain  Description: Control of chronic pain  Outcome: Ongoing

## 2021-07-12 NOTE — PROGRESS NOTES
Physical Therapy  Facility/Department: [facility] ACUTE REHAB UNIT  Daily Treatment Note  NAME: Evelia Lau  : 1935  MRN: [id number]    Date of Service: 2021    Discharge Recommendations:  24 hour supervision or assist, Home with Home health PT, S Level 3   PT Equipment Recommendations  Equipment Needed: Yes  Other: TBD with progress. Currently, pt will require a sukhwinder lift and manual w/c    Assessment   Body structures, Functions, Activity limitations: Decreased functional mobility ; Decreased balance;Decreased coordination;Decreased endurance;Decreased strength;Decreased posture;Decreased ADL status; Decreased safe awareness;Decreased ROM  Assessment: Pt continues to require extensive assist of 2 for all functional mobility and ADL completion at this time with ongoing extensive (L) lateral lean + pushing in both seated and standing position. Pt remains non ambulatory secondary to poor standing and sitting balance. Pt seemed to be more cognitively alert at the begining of today's session, but decreased with fatigue. Pt demonstrated more initation with L UE at the begining of today's session, but deminished with fatigue. Treatment Diagnosis: decreased balance, coordination, and functional mobiilty  Prognosis: Fair  Decision Making: Medium Complexity  PT Education: Goals;PT Role;Plan of Care;Transfer Training;General Safety; Functional Mobility Training  Patient Education: Pt will require reinforcement secondary to cognitive deficits. Barriers to Learning: cognition  REQUIRES PT FOLLOW UP: Yes  Activity Tolerance  Activity Tolerance: Patient limited by endurance; Patient limited by cognitive status  Activity Tolerance: Therapist did not notice any increased respiratory pattern during today's session. Pt demonstrated decreased command following within therapy session. Lateral lean exaggerates throughout session as pt fatigues.      Patient Diagnosis(es): JACQUELINE     has a past medical history of Dementia ([facility] [address] HTN (hypertension), Hyperlipidemia, and PONV (postoperative nausea and vomiting). has a past surgical history that includes Knee arthroscopy; Colonoscopy; eye surgery (Right, 11/2014); Cystoscopy (04/04/2018); and TURP (04/26/2018). Restrictions  Restrictions/Precautions  Restrictions/Precautions: Fall Risk, Modified Diet  Required Braces or Orthoses?: No  Position Activity Restriction  Other position/activity restrictions: 81year old male with a history of CAD, dementia, PVD who was admitted to [facility] on 6/[facility] with left-sided weakness. He was recently evaluated at this facility for an episode of syncope. Work-up revealed a right watershed ischemic stroke in the right frontal and parietal regions. He was initiated on Plavix in addition to aspirin and statin. A1c 5.3. LDL 88. Echo was unremarkable for an embolic source. He was evaluated by therapy and suggested to continue in an inpatient setting prior to returning home. He has no current complaints today. He is currently sitting up with therapy and having intermittent episodes of increased respirations with decreased heart rate. Subjective   General  Chart Reviewed: Yes  Additional Pertinent Hx: dementia, HTN, DM  Response To Previous Treatment: Patient with no complaints from previous session. Family / Caregiver Present: Yes (daughter present for session.)  Referring Practitioner: Dr Serrato Limon  Subjective  Subjective: Pt in bed upon arrival. Agreeable to therapy services. General Comment  Comments: Nursing stated that pt slid out of recliner prior to session. Pain Screening  Patient Currently in Pain: Denies  Vital Signs  Patient Currently in Pain: Denies       Orientation     Cognition   Cognition  Overall Cognitive Status: Exceptions  Arousal/Alertness: Delayed responses to stimuli  Following Commands: Follows one step commands with repetition; Follows one step commands with increased time  Attention Span: Attends with cues to redirect  Memory: Decreased recall of biographical Information;Decreased short term memory;Decreased long term memory;Decreased recall of recent events  Safety Judgement: Decreased awareness of need for safety;Decreased awareness of need for assistance  Problem Solving: Decreased awareness of errors;Assistance required to identify errors made;Assistance required to correct errors made  Insights: Decreased awareness of deficits  Initiation: Requires cues for all  Sequencing: Requires cues for all  Cognition Comment: Increased processing time for verbal commands  Objective   Bed mobility  Rolling to Left: Maximum assistance  Rolling to Right: Maximum assistance  Supine to Sit: Dependent/Total;2 Person assistance  Transfers  Sit to Stand: 2 Person Assistance;Maximum Assistance  Stand to sit: 2 Person Assistance;Dependent/Total  Comment: extensive (L) lateral lean in both seated prep and standing transfer completion. Pt does not fully extend knees during stands. Ambulation  Ambulation?: No  Stairs/Curb  Stairs?: No     Balance  Posture: Poor  Sitting - Static: Poor  Sitting - Dynamic: Poor;-  Standing - Static: Poor;-  Standing - Dynamic: Poor;-  Comments: consistent (L) lateral lean in all positions. Patient inconsistantly can self correct posture in seated position upon VCs. Requires max (A) to obtain and maintain midline position. Comment: arrived in pts room with pt in bed and daughter present in room. Pt's daughter states that nursing recently put pt in bed after sliding out of recliner. Pt trasfered from supine->sit at EOB, maxAx2. Pt performed ADL while sitting EOB, required maxA from therapist for sitting balance to resist trunk extension and L lateral lean. Pt required maxAx2 for sit->supine and maxAx1 for L and R rolling. Pt transfered to / by yuniel. Pt performed sit -> stand from w/c to place sling for standing frame, maxAx2, but maxAx1 to maintain standing posture x25 seconds.  Pt stood in standing frame x5mins while performing reachig activites w/ R UE to initate weight shift onto R LE. Pt able to demonstrate upright posture for initial 30seconds in standing frame before fatiguing and demonstrating L lateral lean and forward flexed posture. Pt requrested to take a seated rest break. Pt stood in standing frame additional 3 mins while performing reaching activites with R UE to initiate bodyweight shift onto R LE. Pt returned to room and left in w/c with alarm on and all needs in place. A towel roll was placed behind pt's L shoulder to assist correcting L trunk lean.               G-Code     OutComes Score                                                     AM-PAC Score             Goals  Short term goals  Time Frame for Short term goals: 1-2 weeks  Short term goal 1: Pt will perform all bed mobility with maxA of 1  Short term goal 2: Pt will perform SPT with maxA of 1  Short term goal 3: Pt will ambulate 8' with LRAD with maxA of 1  Short term goal 4: Pt will negotiate 1 step with LRAD and maxA of 1  Long term goals  Time Frame for Long term goals : 2-3 weeks  Long term goal 1: Pt will perform all bed mobility with CGA  Long term goal 2: Pt will perform all transfers with CGA  Long term goal 3: Pt will ambulate 48' with LRAD CGA  Long term goal 4: Pt will negotiate 12 steps with kapil  Patient Goals   Patient goals : pt unable to assess    Plan    Plan  Times per week: 60 minutes a day 5 days a week  Current Treatment Recommendations: Strengthening, Transfer Training, Endurance Training, Neuromuscular Re-education, Patient/Caregiver Education & Training, ROM, Wheelchair Mobility Training, Manual Therapy - Soft Tissue Mobilization, Equipment Evaluation, Education, & procurement, Balance Training, Gait Training, Functional Mobility Training, Stair training, Safety Education & Training, Positioning, ADL/Self-care Training  Safety Devices  Type of devices: Call light within reach, Chair alarm in place, Left in chair, Telesitter in use, Gait belt, All fall risk precautions in place  Restraints  Initially in place: No     Therapy Time   Individual Concurrent Group Co-treatment   Time In       1330   Time Out       1438   Minutes       68        Timed Code Treatment Minutes:  68    Total Treatment Minutes:  [address], [facility]    Therapist was present, directed the patient's care, made skilled judgement, and was responsible for assessment and treatment of the patient.  Co-signed and supervised by:  Ezra Armstrong, DPT [id number]

## 2021-07-12 NOTE — PROGRESS NOTES
Occupational Therapy  Facility/Department: [facility] ACUTE REHAB UNIT  Daily Treatment Note  NAME: Kaylee Rodgers  : 1935  MRN: [id number]    Date of Service: 2021    Discharge Recommendations:  Home with Home health OT, S Level 3, Home with nursing aide, 24 hour supervision or assist  OT Equipment Recommendations  Equipment Needed: Yes  Other: Elspeth Tanya lift, hospital bed, BSC if family prefers over bedpan    Assessment   Performance deficits / Impairments: Decreased functional mobility ; Decreased endurance;Decreased coordination;Decreased posture;Decreased ADL status; Decreased ROM; Decreased strength;Decreased balance;Decreased vision/visual deficit; Decreased high-level IADLs;Decreased safe awareness;Decreased cognition;Decreased fine motor control  Assessment: 81 y.o. male presents w/ the above deficits which are impacting his occupational performance. Pt would benefit from continued therapy during inpatient stay in order to maximize safety and independence upon discharge. Treatment Diagnosis: Multiple performance deficits s/p CVA  OT Education: OT Role;Plan of Care;ADL Adaptive Strategies;Transfer Training;Orientation; Energy Conservation  Patient Education: Pt is not an independent learner  Barriers to Learning: Cognition  REQUIRES OT FOLLOW UP: Yes  Activity Tolerance  Activity Tolerance: Patient Tolerated treatment well  Activity Tolerance: Pt continues to demo L lateral lean and pushing behavior requiring increased physical assistance to correct. Safety Devices  Safety Devices in place: Yes  Type of devices: All fall risk precautions in place;Call light within reach;Gait belt;Patient at risk for falls;Nurse notified; Left in chair;Chair alarm in place         Patient Diagnosis(es): CVA   has a past medical history of Dementia ([address] HTN (hypertension), Hyperlipidemia, and PONV (postoperative nausea and vomiting). has a past surgical history that includes Knee arthroscopy;  Colonoscopy; eye surgery (Right, 11/2014); Cystoscopy (04/04/2018); and TURP (04/26/2018). Restrictions  Restrictions/Precautions  Restrictions/Precautions: Fall Risk, Modified Diet  Required Braces or Orthoses?: No  Position Activity Restriction  Other position/activity restrictions: 81year old male with a history of CAD, dementia, PVD who was admitted to [facility] on 6/19 with left-sided weakness. He was recently evaluated at this facility for an episode of syncope. Work-up revealed a right watershed ischemic stroke in the right frontal and parietal regions. He was initiated on Plavix in addition to aspirin and statin. A1c 5.3. LDL 88. Echo was unremarkable for an embolic source. He was evaluated by therapy and suggested to continue in an inpatient setting prior to returning home. He has no current complaints today. He is currently sitting up with therapy and having intermittent episodes of increased respirations with decreased heart rate. Subjective   General  Chart Reviewed: Yes  Patient assessed for rehabilitation services?: Yes  Response to previous treatment: Patient with no complaints from previous session  Family / Caregiver Present: No  Diagnosis: CVA  Subjective  Subjective: Pt supine in bed upon entry, pleasant and agreeable to OT/PT cotx  Vital Signs  Patient Currently in Pain: No     Objective    ADL  Grooming: Setup;Minimal assistance (oral care while seated in w/c)  UE Bathing: Setup; Moderate assistance  LE Bathing: Setup;Maximum assistance  UE Dressing: Setup;Dependent/Total  LE Dressing: Setup;Dependent/Total  Additional Comments: Completed shaving w/ pt seated upright in bed. Transferred to EOB for UB/LB sponge bathing, second person assist for sitting balance d/t significant posterior lean. Transitioned to supine to complete LB dressing. Siobhan Davis lifted to chair, completed oral care while seated at sink.      Balance  Sitting Balance: Dependent/Total  Standing Balance: Dependent/Total  Standing Balance  Time: 5 min + 3 min  Activity: Pt completed 2 stances in standing frame. Completed R lateral reaching once in stance in order to facilitate R weight shift. Pt fatigues quickly during task. Comment: Second person assist throughout for standing balance for midline orientation  Bed mobility  Rolling to Left: Maximum assistance  Rolling to Right: Maximum assistance  Supine to Sit: Dependent/Total;2 Person assistance  Sit to Supine: Dependent/Total;2 Person assistance  Transfers  Sit to stand: Dependent/Total  Stand to sit: Dependent/Total      Cognition  Overall Cognitive Status: Exceptions  Arousal/Alertness: Delayed responses to stimuli  Following Commands: Follows one step commands with repetition; Follows one step commands with increased time  Attention Span: Attends with cues to redirect  Memory: Decreased recall of biographical Information;Decreased short term memory;Decreased long term memory;Decreased recall of recent events  Safety Judgement: Decreased awareness of need for safety;Decreased awareness of need for assistance  Problem Solving: Decreased awareness of errors;Assistance required to identify errors made;Assistance required to correct errors made  Insights: Decreased awareness of deficits  Initiation: Requires cues for all  Sequencing: Requires cues for all  Cognition Comment: Increased processing time for verbal commands         Plan   Plan  Times per week: 60 min; 5-7x  Times per day: Daily  Current Treatment Recommendations: Strengthening, Patient/Caregiver Education & Training, Home Management Training, Equipment Evaluation, Education, & procurement, Balance Training, Neuromuscular Re-education, Functional Mobility Training, Endurance Training, Safety Education & Training, Self-Care / ADL    Goals  Short term goals  Time Frame for Short term goals: 1-2 weeks  Short term goal 1: Functional transfer for ADL completion w/ assist x1-- not met, progressing  Short term goal 2: Bathing w/ assist x1-- not met, progressing  Short term goal 3: LB dressing w/ assist x1-- not met, progressing  Short term goal 4: Toileting w/ assist x1-- not met, progressing  Short term goal 5: UB dressing w/ Min A-- not met, progressing  Long term goals  Time Frame for Long term goals : 3-4 weeks  Long term goal 1: Functional transfer for ADL completion w/ supervision-- not met, progressing  Long term goal 2: Bathing w/ supervision-- not met, progressing  Long term goal 3: LB dressing w/ supervision-- not met, progressing  Long term goal 4: Toileting w/ supervision-- not met, progressing  Long term goal 5: UB dressing w/ supervision-- not met, progressing  Patient Goals   Patient goals :  To go home       Therapy Time   Individual Concurrent Group Co-treatment   Time In       1330   Time Out       1438   Minutes       68        Timed Code Treatment Minutes:   68  Total Treatment Minutes:  [address] CHRISTIE, OT   Chili OTR/L, [place] #[id number]

## 2021-07-12 NOTE — PROGRESS NOTES
Liana Postal  7/12/2021  [id number]    Chief Complaint: Acute embolic stroke ([address]    Subjective:   Resting in bedside chair this am. No new c/o. ROS: No CP, SOB, dyspnea    Objective:  Patient Vitals for the past 24 hrs:   BP Temp Temp src Pulse Resp SpO2   07/12/21 0745 131/84 98.1 °F (36.7 °C) Oral 56 17 95 %   07/11/21 2056 (!) 170/81   60     07/11/21 2054 (!) 176/84 98.2 °F (36.8 °C) Oral 61 18 95 %     Gen: No distress, pleasant, flat affect. Resting in bedside chair  HEENT: Normocephalic, atraumatic  CV: Regular rate and rhythm. No MRG   Resp: No respiratory distress. CTAB. Abd: Soft, nontender nondistended  Ext: No edema. Neuro: Alert, poor initiation, appropriately interactive. Left neglect    Laboratory data: Available via EMR. Therapy progress:  PT  Position Activity Restriction  Other position/activity restrictions: 81year old male with a history of CAD, dementia, PVD who was admitted to [facility] on 6/19 with left-sided weakness. He was recently evaluated at this facility for an episode of syncope. Work-up revealed a right watershed ischemic stroke in the right frontal and parietal regions. He was initiated on Plavix in addition to aspirin and statin. A1c 5.3. LDL 88. Echo was unremarkable for an embolic source. He was evaluated by therapy and suggested to continue in an inpatient setting prior to returning home. He has no current complaints today. He is currently sitting up with therapy and having intermittent episodes of increased respirations with decreased heart rate. Objective     Sit to Stand: 2 Person Assistance, Maximum Assistance  Stand to sit: 2 Person Assistance, Dependent/Total  Bed to Chair: 2 Person Assistance, Dependent/Total     OT  PT Equipment Recommendations  Equipment Needed: Yes  Other: TBD with progress.   Currently, pt will require a sukhwinder lift and manual w/c  Toilet - Technique: Stand pivot  Equipment Used: Extra wide bedside commode  Toilet Transfers Comments: Max A x2  Assessment        SLP                Body mass index is 29.08 kg/m². Assessment:  Patient Active Problem List   Diagnosis    Essential hypertension    Hyperlipidemia    Palpitations    Family history of premature CAD    Shoulder pain    Knee pain    Late onset Alzheimer's disease without behavioral disturbance [address]    Acute renal failure (ARF) (HCC)    Benign prostatic hyperplasia (BPH) with post-void dribbling    Near syncope    Dementia due to Alzheimer's disease ([facility]    Acute embolic stroke ([facility]       Plan:   Right frontal and parietal watershed infarcts: ASA, statin. Plavix for 21 days (7/10). PT/OT/SLP. Event monitor in place. Started Lexapro for motor recovery     Dysphagia: dysphagia diet, SLP    Intermittent tachypnea: HR decreased during episodes. Sats stable during episodes. Consider seizure activity vs central etiology as alternative options. Consulted pulm, appreciate assistance.      CAD: ASA, statin      HTN: atenolol, lisinopril 10 BID     Dementia: SLP     Urinary retention: flomax, proscar. PNA: suspect aspiration related. Levaquin completed     Bowels: Per protocol  Bladder: Per protocol   Sleep: Trazodone provided prn. Pain: tylenol, tramadol   DVT PPx: Lovenox   SANTIAGO: 7/16    Electronically signed by KELECHI Parekh CNP on 7/12/2021 at 9:17 AM    The patient was seen in conjunction with the nurse practitioner with independent history, evaluation and examination performed on the same day as her encounter. I agree with the note which has been adjusted to reflect my findings. I have had face to face contact with the patient and performed a substantive portion of the E/M visit. Alan Sepulveda MD 7/12/2021, 12:14 PM

## 2021-07-13 PROCEDURE — 97530 THERAPEUTIC ACTIVITIES: CPT

## 2021-07-13 PROCEDURE — 92526 ORAL FUNCTION THERAPY: CPT

## 2021-07-13 PROCEDURE — 97130 THER IVNTJ EA ADDL 15 MIN: CPT

## 2021-07-13 PROCEDURE — 6360000002 HC RX W HCPCS: Performed by: PHYSICAL MEDICINE & REHABILITATION

## 2021-07-13 PROCEDURE — 1280000000 HC REHAB R&B

## 2021-07-13 PROCEDURE — 97129 THER IVNTJ 1ST 15 MIN: CPT

## 2021-07-13 PROCEDURE — 97535 SELF CARE MNGMENT TRAINING: CPT

## 2021-07-13 PROCEDURE — 6370000000 HC RX 637 (ALT 250 FOR IP): Performed by: NURSE PRACTITIONER

## 2021-07-13 PROCEDURE — 6370000000 HC RX 637 (ALT 250 FOR IP): Performed by: PHYSICAL MEDICINE & REHABILITATION

## 2021-07-13 RX ADMIN — ASPIRIN 81 MG: 81 TABLET, CHEWABLE ORAL at 07:58

## 2021-07-13 RX ADMIN — LISINOPRIL 10 MG: 10 TABLET ORAL at 07:58

## 2021-07-13 RX ADMIN — LISINOPRIL 10 MG: 10 TABLET ORAL at 21:50

## 2021-07-13 RX ADMIN — TRAZODONE HYDROCHLORIDE 50 MG: 50 TABLET ORAL at 21:50

## 2021-07-13 RX ADMIN — FINASTERIDE 5 MG: 5 TABLET, FILM COATED ORAL at 07:58

## 2021-07-13 RX ADMIN — ENOXAPARIN SODIUM 40 MG: 40 INJECTION SUBCUTANEOUS at 07:58

## 2021-07-13 RX ADMIN — ATORVASTATIN CALCIUM 40 MG: 40 TABLET, FILM COATED ORAL at 21:50

## 2021-07-13 RX ADMIN — ESCITALOPRAM OXALATE 5 MG: 10 TABLET ORAL at 07:58

## 2021-07-13 RX ADMIN — TAMSULOSIN HYDROCHLORIDE 0.4 MG: 0.4 CAPSULE ORAL at 07:58

## 2021-07-13 RX ADMIN — ATENOLOL 25 MG: 50 TABLET ORAL at 07:58

## 2021-07-13 ASSESSMENT — PAIN SCALES - GENERAL
PAINLEVEL_OUTOF10: 0
PAINLEVEL_OUTOF10: 0

## 2021-07-13 NOTE — PROGRESS NOTES
Occupational Therapy  Facility/Department: [facility] ACUTE REHAB UNIT  Daily Treatment Note  NAME: Serafin Whitley  : 1935  MRN: [id number]    Date of Service: 2021    Discharge Recommendations:  Home with Home health OT, S Level 3, Home with nursing aide, 24 hour supervision or assist  OT Equipment Recommendations  Equipment Needed: Yes  Other: Clement Dayton lift, hospital bed, BSC if family prefers over bedpan    Assessment   Performance deficits / Impairments: Decreased functional mobility ; Decreased endurance;Decreased coordination;Decreased posture;Decreased ADL status; Decreased ROM; Decreased strength;Decreased balance;Decreased vision/visual deficit; Decreased high-level IADLs;Decreased safe awareness;Decreased cognition;Decreased fine motor control  Assessment: 81 y.o. male presents w/ the above deficits which are impacting his occupational performance. Pt would benefit from continued therapy during inpatient stay in order to maximize safety and independence upon discharge. Treatment Diagnosis: Multiple performance deficits s/p CVA  OT Education: OT Role;Plan of Care;ADL Adaptive Strategies;Transfer Training;Orientation; Energy Conservation  Patient Education: Pt is not an independent learner  Barriers to Learning: Cognition  REQUIRES OT FOLLOW UP: Yes  Activity Tolerance  Activity Tolerance: Patient Tolerated treatment well  Activity Tolerance: Pt continues to demo L lateral lean and pushing behavior requiring increased physical assistance to correct. Safety Devices  Safety Devices in place: Yes  Type of devices: All fall risk precautions in place;Call light within reach;Gait belt;Patient at risk for falls;Nurse notified; Left in chair;Chair alarm in place         Patient Diagnosis(es): CVA     has a past medical history of Dementia ([address] HTN (hypertension), Hyperlipidemia, and PONV (postoperative nausea and vomiting). has a past surgical history that includes Knee arthroscopy;  Colonoscopy; eye surgery (Right, 11/2014); Cystoscopy (04/04/2018); and TURP (04/26/2018). Restrictions  Restrictions/Precautions  Restrictions/Precautions: Fall Risk, Modified Diet  Required Braces or Orthoses?: No  Position Activity Restriction  Other position/activity restrictions: 81year old male with a history of CAD, dementia, PVD who was admitted to [facility] on 6/19 with left-sided weakness. He was recently evaluated at this facility for an episode of syncope. Work-up revealed a right watershed ischemic stroke in the right frontal and parietal regions. He was initiated on Plavix in addition to aspirin and statin. A1c 5.3. LDL 88. Echo was unremarkable for an embolic source. He was evaluated by therapy and suggested to continue in an inpatient setting prior to returning home. He has no current complaints today. He is currently sitting up with therapy and having intermittent episodes of increased respirations with decreased heart rate. Subjective   General  Chart Reviewed: Yes  Patient assessed for rehabilitation services?: Yes  Response to previous treatment: Patient with no complaints from previous session  Family / Caregiver Present: No  Diagnosis: CVA  Subjective  Subjective: Pt seated in recliner upon entry, agreeable to OT/PT cotx  Vital Signs  Patient Currently in Pain: No     Objective    ADL  Grooming: Setup;Minimal assistance (oral care while seated in w/c)  UE Dressing: Setup;Dependent/Total  LE Dressing: Setup;Dependent/Total  Toileting: Dependent/Total  Additional Comments: Completed UB dressing while seated in recliner. Pt incontinent of urine x2 during session. Attempted to complete stance to complete LB clothing management however pt w/ pushing behavior/posterior lean limiting ability to complete full stance. Flor Sánchez lift>EOB to complete LB dressing from supine position. Alicia>w/c.  Pt completed grooming tasks from w/c level     Balance  Sitting Balance: Dependent/Total  Standing Balance: Dependent/Total  Standing Balance  Time: 6 min + 3 min  Activity: Pt completed 2 stances in standing frame. Pt completed reciprocal ball toss during first stance w/ assist x1 for trunk stability + assist to functionally use LUE, pt tolerated well. Pt completed FM task during second stance to address visual scanning and FM skills  Comment: Second person assist throughout for standing balance for midline orientation  Bed mobility  Rolling to Left: Maximum assistance  Rolling to Right: Dependent/Total  Supine to Sit: Dependent/Total;2 Person assistance  Sit to Supine: Dependent/Total;2 Person assistance  Transfers  Sit to stand: Dependent/Total  Stand to sit: Dependent/Total      Cognition  Overall Cognitive Status: Exceptions  Arousal/Alertness: Delayed responses to stimuli  Following Commands: Follows one step commands with repetition; Follows one step commands with increased time  Attention Span: Attends with cues to redirect  Memory: Decreased recall of biographical Information;Decreased short term memory;Decreased long term memory;Decreased recall of recent events  Safety Judgement: Decreased awareness of need for safety;Decreased awareness of need for assistance  Problem Solving: Decreased awareness of errors;Assistance required to identify errors made;Assistance required to correct errors made  Insights: Not aware of deficits  Initiation: Requires cues for all  Sequencing: Requires cues for all         Plan   Plan  Times per week: 60 min; 5-7x  Times per day: Daily  Current Treatment Recommendations: Strengthening, Patient/Caregiver Education & Training, Home Management Training, Equipment Evaluation, Education, & procurement, Balance Training, Neuromuscular Re-education, Functional Mobility Training, Endurance Training, Safety Education & Training, Self-Care / ADL    Goals  Short term goals  Time Frame for Short term goals: 1-2 weeks  Short term goal 1: Functional transfer for ADL completion w/ assist x1-- not met, progressing  Short term goal 2: Bathing w/ assist x1-- not met, progressing  Short term goal 3: LB dressing w/ assist x1-- not met, progressing  Short term goal 4: Toileting w/ assist x1-- not met, progressing  Short term goal 5: UB dressing w/ Min A-- not met, progressing  Long term goals  Time Frame for Long term goals : 3-4 weeks  Long term goal 1: Functional transfer for ADL completion w/ supervision-- not met, progressing  Long term goal 2: Bathing w/ supervision-- not met, progressing  Long term goal 3: LB dressing w/ supervision-- not met, progressing  Long term goal 4: Toileting w/ supervision-- not met, progressing  Long term goal 5: UB dressing w/ supervision-- not met, progressing  Patient Goals   Patient goals :  To go home       Therapy Time   Individual Concurrent Group Co-treatment   Time In       0830   Time Out       0930   Minutes       60        Timed Code Treatment Minutes:  60 Minutes    Total Treatment Minutes:  60 minutes       Rubina Button, OT   Rubina Button OTR/LUIS, [place] #[id number]

## 2021-07-13 NOTE — PROGRESS NOTES
ACUTE REHAB UNIT  SPEECH/LANGUAGE PATHOLOGY       [x] Daily  [x] Weekly Care Conference Note  [] Discharge    Patient:Reno Naylor     :1935  QKE:[id number]  Room #: XAX-9199/9253-99   Rehab Dx/Hx: Acute embolic stroke [facility]) [D05.4]  Date of Admit: 2021      Precautions: falls and aspirations  Home situation: Per pt's daughter Bjorn Ny) pt lives with his grandson (22years old). Jignesh Flores reports plan is for her and her family to move into his place to provide more assistance. ST Dx: Mild-moderate oropharyngeal dysphagia; Severe cognitive linguistic deficits; Moderate dysarthria / dysphonia    Daily Treatment Info:   Date: 2021     Tx session 1 Tx session 2   Pain Denied Denied   Subjective     Pt seen sitting and leaning towards the left side upon SLP arrival. Repositioned with RN and placed feet on recliner down for more upright positioning with po. Required ongoing repositioning during session due to left lean. Pt continues with flat affect and has varied response time while easily distracted. Pt pleasant and cooperative throughout session. Pt seen laying back in bed leaning towards the right side upon SLP arrival. Pt readjusted to center of bed and positioned upright for therapy session, however, R preferred gaze remained. Pt turned head and attended and responded to other SLP positioned on pt's L side 1/3 opp. Pt pleasant and cooperative throughout session. Pt continues to have flat affect and varied response times while being easily distracted. Weekly Update:  Pt continues with minimal progress towards goals. Overall cognition is severely impaired and pt has limited carry over of new information. Pt continues with significantly reduced attention and varied response times. When alert, his response times improve although accuracy is limited. Orientation and insight remain inconsistent.  Pt continues to demonstrate impulsivity with eating and drinking increasing his risk for aspiration, especially with thin consistencies in combination with other textures. He is inconsistent with overall tolerance across meals and he is minimally responsive to verbal cues for use of safe swallow strategies. Continue per POC. Objective:  Goals     Pt will tolerate recommended diet and advanced trials with use of compensatory swallow strategies provided with < mod cues with no overt clinical s/s of aspiration or noted difficulty. Pt seen with breakfast meal. Pt consumed soft solids (chopped pancakes and sausage) and tolerated them with no overt s/s of aspiration. Pt tolerated regular solids (rodriguez) with no overt s/s of aspiration, however, remains impulsive and takes large bites with an increased rate. Despite verbal cues, pt continued with impulsivity throughout meal.   Pt seemingly tolerated hot thins in isolation (coffee) 5 times and 6 times in combination of other textures, when he took a drink prior to clearing food from oral cavity. Pt presented with a change in vocal quality with an ongoing rough voice throughout. Suspect continued penetration with thin liquids based on pt's MBS (espeically when thins are provided in combination with other textures) with pt producing intermittent changes in vocal quality throughout meal assessment. Pt was asked twice to cough to clear throat and swallow hard to improve vocal quality. Goal not targeted this session.      Continues on modified diet (due to impulsivity/ cognitive status with poor responsiveness to verbal cues for use of safe swallow precautions)      Pt will respond and/or initiate action upon verbal prompt within 3 seconds in 4 out of 5 opportunities without repetition.   - Pt did not respond x 2 during session when asked basic conversational questions  - Response times varied and pt needed repetition/ extended time to respond  Pt had significant difficulty responding to questions asked about order of alphabet and responding to SLP by drawing lines between each letter. Improved timeliness of responses to verbal problem solving questions/ scenarios although response times continued to vary. Required SLP to ask if pt wanted a repetition occasionally during task. Continue (pt making slight improvement but response times continue to vary)      Pt will use visual aids/ strategies to state orientation to time, place, situation with 100% accuracy across 3 consecutive sessions. Pt oriented to:  -time (with min cue to look at clock)  -ABHINAV  -year  -gross approximation to date (pt answered \"12th\". Pt cued to say the date after the one stated)  -hospital name    Pt  disoriented to:  -situation (able to state given a field choice of 3)  -month (with field choice of 3) Pt oriented to:  - ABHINAV with use of written schedule     Continue (inconsistent)        Pt will improve oral motor function, articulatory precision and breath support in connected speech via graded tasks to >80% acc with use of speech strategies. GOAL MET  GOAL MET    Pt will improve verbal initiation and thought organization for extended utterances (i.e. related to personal hx/ recall of recent events, etc.) >75% acc. Pt continues answering yes/no questions with one word and not elaborating. Pt needs extended time to answer detailed questions that involve more than 1-2 utterances per answer.   - Pt initiated to ask what immunization the RN was giving him. Pt did not recall name of immunization or what it was used for as was previously discussed previous date. Fx Recall   -Pt unable to recall what activity was completed previous date.   - unable to recall fall from previous date (slipping out of chair)  - d/c date (unable to recall)   - Pt initially unable to recall seeing any visitors yesterday with cue from SLP stating she thought she saw his daughter here pt able to name daughter that was present   - At end of session pt able to recall what shot was for    Pt continues to answer with 1-2 word utterances and needs further cues to expand utterances. Fx Recall  - Pt unable to recall upcoming d/c date  - Unable to recall seeing rehab MD this AM   - Able to state next meal (lunch)  - Unable to recall if any visitors are coming this date  - Unable to recall type of therapy this session  - Able to recall 1 breakfast item in first trial. Unable to recall any breakfast items in second trial.     Continue (slight improvement for verbal initiation but continues to required min-mod cues for verbal expansion)            Patient will complete fx problem solving tasks and demonstrate insight into limitations and impact on daily functioning with >75% acc, min cues. When asked how to get from chair to bed, pt responded \"call nurse\". Pt required visual cue for this response. When asked what to push if pt needs help, pt responded \"red button\". \"red\". Pt was able to state the last therapy he had for the day along with the time it occurs with the use of a visual written schedule. Pt was able to answer when OT/PT was scheduled by pointing to the visual schedule with mod verbal cues. Safety Scenarios  Pt able to independently and accurately respond to 8/15 (53%) questions which increased to 10/15 (67%) given min cue of simplification of question or verbal cue to further explain answer. Pt was able to answer 14/15 (93%) with mod cues of field choices of 3, repetition, and simplification. Trail task Connecting letters A-Z  Pt connected letters A-G with mod/max cues. Tactile cues (hand over hand and pointing to each letter) along with verbal cues to state which letter should be connected next. The task was discontinued due to overall difficulty and poor attention to task provided with max cues. Continue     Other areas targeted:           Education:   Provided education re rationale for treatment and plan of care. Pt has limited comprehension and requires ongoing simplified education. Provided education re rationale for treatment and plan of care. Pt with limited comprehension, requires ongoing, simplified education. Safety Devices: [x] Call light within reach  [x] Chair alarm activated  [] Bed alarm activated  [x] Other: Camera in place for safety  [x] Call light within reach  [] Chair alarm activated  [x] Bed alarm activated  [x] Other: Camera in place for safety     Assessment:   Speech Therapy Diagnosis  Cognitive Diagnosis: Pt presents with severe cognitive linguistic deficits due to reduced orienation, reduced processing speed, reduced insight into current situation, reduced immediate/ working/ short term memory with fx problem solving difficulty. Per chart review pt with baseline dementia, did not complete IADLs at home prior to CVA but was able to be safely left alone with daily check ins from family. Aphasia Diagnosis: Significantly reduced processing speed/ difficulty following multi-step commands. Unable to discern if due to pt being [place] vs reduced auditory comprehension/ initiation, requires further assessment. Pt c/o word finding diffiuclty, pt with minimal verbal output during evaluation, requires ongoing assessment for goal generation. Speech Diagnosis: Moderate dysarthria/ dysphonia, pt with intermittent periods of tachypnea and reduced breath support impacting intelligibility at the short phrase/ sentence level with reduced articulatory precision, pt with hoarse, gravely wet vocal quality    Current Diet Order: ADULT DIET; Dysphagia - Soft and Bite Sized; Mildly Thick (Nectar)            Plan:     Frequency:  5days/week   60 minutes/day    Discharge Recommendations:   Barriers: Pre-existing cognitive deficit; severity of deficits;  Limited initiation  Discharge Recommendations:  [] Home independently  [x] Home with assistance [x]  24 hour supervision  [] SNF [x] Other: TBD  Continued SLP Treatment:  [x] Yes [] No [] TBD based on progress while on ARU [] Vital Stim indicated [] Other:   Estimated discharge date: 7/16/21    Type of Total Treatment Minutes   Session 1   Session 2   Time In 0800 1000   Time Out 0830 1030   Timed Code Minutes  12 30   Individual Treatment Minutes  30 30   Co-Treatment Minutes      Group Treatment Minutes      Concurrent Treatment Minutes        TOTAL DAILY MINUTES:  61    Electronically Signed by     Napoleon BOOGIE [facility]-SLP #[id number] 7/13/2021 8:27 AM  Speech-Language Pathologist    The speech-language pathologist was present, directed the patient's care, made skilled judgment and was responsible for assessment and treatment.     Siobhan Jose.,  Speech-Language Pathology

## 2021-07-13 NOTE — PROGRESS NOTES
Que Azul  7/13/2021  [id number]    Chief Complaint: Acute embolic stroke ([address]    Subjective:   No issues overnight. No new c/o this AM.    ROS: No CP, SOB, dyspnea    Objective:  Patient Vitals for the past 24 hrs:   BP Temp Temp src Pulse Resp SpO2   07/13/21 0745 (!) 148/70 98.3 °F (36.8 °C) Oral 56 17 95 %   07/12/21 2200 (!) 152/83 98.2 °F (36.8 °C) Oral 58 16 96 %   07/12/21 1315 122/70 97.2 °F (36.2 °C) Oral 56 17 94 %   07/12/21 1300 126/68 97.3 °F (36.3 °C) Oral 55 18 93 %   07/12/21 1105      95 %     Gen: No distress, pleasant, flat affect. Resting in bedside chair  HEENT: Normocephalic, atraumatic  CV: Regular rate and rhythm. No MRG   Resp: No respiratory distress. CTAB. Abd: Soft, nontender nondistended  Ext: No edema. Neuro: Alert, poor initiation, appropriately interactive. Left neglect    Laboratory data: Available via EMR. Therapy progress:  PT  Position Activity Restriction  Other position/activity restrictions: 81year old male with a history of CAD, dementia, PVD who was admitted to [facility] on 6/19 with left-sided weakness. He was recently evaluated at this facility for an episode of syncope. Work-up revealed a right watershed ischemic stroke in the right frontal and parietal regions. He was initiated on Plavix in addition to aspirin and statin. A1c 5.3. LDL 88. Echo was unremarkable for an embolic source. He was evaluated by therapy and suggested to continue in an inpatient setting prior to returning home. He has no current complaints today. He is currently sitting up with therapy and having intermittent episodes of increased respirations with decreased heart rate. Objective     Sit to Stand: 2 Person Assistance, Maximum Assistance  Stand to sit: 2 Person Assistance, Dependent/Total  Bed to Chair: 2 Person Assistance, Dependent/Total     OT  PT Equipment Recommendations  Equipment Needed: Yes  Other: TBD with progress.   Currently, pt will require a sukhwinder lift and manual w/c  Toilet - Technique: Stand pivot  Equipment Used: Extra wide bedside commode  Toilet Transfers Comments: Max A x2  Assessment        SLP                Body mass index is 29.08 kg/m². Assessment:  Patient Active Problem List   Diagnosis    Essential hypertension    Hyperlipidemia    Palpitations    Family history of premature CAD    Shoulder pain    Knee pain    Late onset [facility]'s disease without [facility] disturbance [facility]    Acute renal failure (ARF) (HCC)    Benign prostatic hyperplasia (BPH) with post-void dribbling    Near syncope    Dementia due to [facility]'s disease [facility]    Acute embolic stroke ([facility]       Plan:   Right frontal and parietal watershed infarcts: ASA, statin. Plavix for 21 days (7/10). PT/OT/SLP. Event monitor in place. Started Lexapro for motor recovery     Dysphagia: dysphagia diet, SLP    Intermittent tachypnea: HR decreased during episodes. Sats stable during episodes. Consider seizure activity vs central etiology as alternative options. Consulted pulm, appreciate assistance.      CAD: ASA, statin      HTN: atenolol, lisinopril 10 BID     Dementia: SLP     Urinary retention: flomax, proscar. PNA: suspect aspiration related. Levaquin completed     Bowels: Per protocol  Bladder: Per protocol   Sleep: Trazodone provided prn. Pain: tylenol, tramadol   DVT PPx: Lovenox   SANTIAGO: 7/16    Interdisciplinary team conference was held today with entire rehab treatment team including PT, OT, SLP, Dietician, RN, and SW. Discussion focused on progress toward rehab goals and discharge planning. Barriers: weakness, balance, endurance. Total treatment time >35 min with greater than 50% spent in care coordination. Nicholas A. Halford Closs, MD 7/13/2021, 10:45 AM

## 2021-07-13 NOTE — CARE COORDINATION
Team conference held today. ? Spoke with pt to discuss progress with therapy, barriers to discharge, and plans to return home. Estimated discharge date set for 7/16/16. Will continue to follow for support and discharge planning.     Family Training scheduled for 7/15/2021 @ 8:30 AM    Aat DIMAS, [address]

## 2021-07-13 NOTE — PLAN OF CARE
Problem: Falls - Risk of:  Goal: Will remain free from falls  Description: Will remain free from falls  7/12/2021 2214 by Calli Butts RN  Outcome: Ongoing   Education Provided as followed:  \"Family/Patient made aware of the tailored interventions falls plan using the TIPS TOOL.

## 2021-07-13 NOTE — PLAN OF CARE
Education Provided as followed:  \"Family/Patient made aware of the tailored interventions falls plan using the TIPS TOOL.    Problem: Falls - Risk of:  Goal: Will remain free from falls  Description: Will remain free from falls  7/13/2021 0949 by Jossue Herbert RN  Outcome: Ongoing  7/12/2021 2214 by Padilla Navarro RN  Outcome: Ongoing  Goal: Absence of physical injury  Description: Absence of physical injury  Outcome: Ongoing     Problem: Skin Integrity:  Goal: Will show no infection signs and symptoms  Description: Will show no infection signs and symptoms  Outcome: Ongoing  Goal: Absence of new skin breakdown  Description: Absence of new skin breakdown  Outcome: Ongoing     Problem: Confusion - Acute:  Goal: Absence of continued neurological deterioration signs and symptoms  Description: Absence of continued neurological deterioration signs and symptoms  Outcome: Ongoing  Goal: Mental status will be restored to baseline  Description: Mental status will be restored to baseline  Outcome: Ongoing     Problem: Discharge Planning:  Goal: Ability to perform activities of daily living will improve  Description: Ability to perform activities of daily living will improve  Outcome: Ongoing  Goal: Participates in care planning  Description: Participates in care planning  Outcome: Ongoing     Problem: Injury - Risk of, Physical Injury:  Goal: Will remain free from falls  Description: Will remain free from falls  7/13/2021 0949 by Jossue Herbert RN  Outcome: Ongoing  7/12/2021 2214 by Padilla Navarro RN  Outcome: Ongoing  Goal: Absence of physical injury  Description: Absence of physical injury  Outcome: Ongoing     Problem: Mood - Altered:  Goal: Mood stable  Description: Mood stable  Outcome: Ongoing  Goal: Absence of abusive behavior  Description: Absence of abusive behavior  Outcome: Ongoing  Goal: Verbalizations of feeling emotionally comfortable while being cared for will increase  Description: Verbalizations of feeling emotionally comfortable while being cared for will increase  Outcome: Ongoing     Problem: Psychomotor Activity - Altered:  Goal: Absence of psychomotor disturbance signs and symptoms  Description: Absence of psychomotor disturbance signs and symptoms  Outcome: Ongoing     Problem: Sensory Perception - Impaired:  Goal: Demonstrations of improved sensory functioning will increase  Description: Demonstrations of improved sensory functioning will increase  Outcome: Ongoing  Goal: Decrease in sensory misperception frequency  Description: Decrease in sensory misperception frequency  Outcome: Ongoing  Goal: Able to refrain from responding to false sensory perceptions  Description: Able to refrain from responding to false sensory perceptions  Outcome: Ongoing  Goal: Demonstrates accurate environmental perceptions  Description: Demonstrates accurate environmental perceptions  Outcome: Ongoing  Goal: Able to distinguish between reality-based and nonreality-based thinking  Description: Able to distinguish between reality-based and nonreality-based thinking  Outcome: Ongoing  Goal: Able to interrupt nonreality-based thinking  Description: Able to interrupt nonreality-based thinking  Outcome: Ongoing     Problem: Sleep Pattern Disturbance:  Goal: Appears well-rested  Description: Appears well-rested  Outcome: Ongoing     Problem: IP BLADDER/VOIDING  Goal: LTG - Patient will utilize adaptive techniques/equipment to complete bladder elimination  Outcome: Ongoing  Goal: STG - Patient demonstrates no accidents  Outcome: Ongoing  Goal: STG - Patient will state signs and symptoms of UTI  Outcome: Ongoing  Goal: STG - patient will be able to empty bladder  Outcome: Ongoing     Problem: IP BOWEL ELIMINATION  Goal: LTG - patient will have regular and routine bowel evacuation  Outcome: Ongoing     Problem: Neurological  Goal: Maximum potential motor/sensory/cognitive function  Outcome: Ongoing     Problem: Pain:  Description: Pain management should include both nonpharmacologic and pharmacologic interventions.   Goal: Pain level will decrease  Description: Pain level will decrease  Outcome: Ongoing  Goal: Control of acute pain  Description: Control of acute pain  Outcome: Ongoing  Goal: Control of chronic pain  Description: Control of chronic pain  Outcome: Ongoing

## 2021-07-13 NOTE — PROGRESS NOTES
Physical Therapy  Facility/Department: [facility] ACUTE REHAB UNIT  Daily Treatment Note  NAME: Addie Gary  : 1935  MRN: [id number]    Date of Service: 2021    Discharge Recommendations:  24 hour supervision or assist, Home with Home health PT, S Level 3   PT Equipment Recommendations  Equipment Needed: Yes  Other: TBD with progress. Currently, pt will require a sukhwinder lift and manual w/c    Assessment   Body structures, Functions, Activity limitations: Decreased functional mobility ; Decreased balance;Decreased coordination;Decreased endurance;Decreased strength;Decreased posture;Decreased ADL status; Decreased safe awareness;Decreased ROM  Assessment: Pt continues to require extensive assist of 2 for all functional mobility and ADL completion at this time with ongoing extensive (L) lateral lean + pushing in both seated and standing position. Pt remains non ambulatory secondary to poor standing and sitting balance. Pt seemed to be more cognitively alert at the begining of today's session, but decreased with fatigue. Pt demonstrated more initation with trunk, as demonstrated by flexing forwards independently to help peter pants. Pt aslo demonstrated muscle activation with L LE as pt was able to slightly DF foot and elevate L foot off of the ground upon request.  Treatment Diagnosis: decreased balance, coordination, and functional mobiilty  Prognosis: Fair  Decision Making: Medium Complexity  PT Education: Goals;PT Role;Plan of Care;Transfer Training;General Safety; Functional Mobility Training  Patient Education: Pt will require reinforcement secondary to cognitive deficits. Barriers to Learning: cognition  REQUIRES PT FOLLOW UP: Yes  Activity Tolerance  Activity Tolerance: Patient limited by endurance; Patient limited by cognitive status  Activity Tolerance: Increased repiratory pattern was noted at the begining of today's session but diminished throughout session.  Pt demonstrated decreased command following within therapy session. Lateral lean exaggerates throughout session as pt fatigues. Patient Diagnosis(es): CVA   has a past medical history of Dementia ([address] HTN (hypertension), Hyperlipidemia, and PONV (postoperative nausea and vomiting). has a past surgical history that includes Knee arthroscopy; Colonoscopy; eye surgery (Right, 11/2014); Cystoscopy (04/04/2018); and TURP (04/26/2018). Restrictions  Restrictions/Precautions  Restrictions/Precautions: Fall Risk, Modified Diet  Required Braces or Orthoses?: No  Position Activity Restriction  Other position/activity restrictions: 81year old male with a history of CAD, dementia, PVD who was admitted to [facility] on 6/19 with left-sided weakness. He was recently evaluated at this facility for an episode of syncope. Work-up revealed a right watershed ischemic stroke in the right frontal and parietal regions. He was initiated on Plavix in addition to aspirin and statin. A1c 5.3. LDL 88. Echo was unremarkable for an embolic source. He was evaluated by therapy and suggested to continue in an inpatient setting prior to returning home. He has no current complaints today. He is currently sitting up with therapy and having intermittent episodes of increased respirations with decreased heart rate. Subjective   General  Chart Reviewed: Yes  Additional Pertinent Hx: dementia, HTN, DM  Response To Previous Treatment: Patient with no complaints from previous session. Family / Caregiver Present: No  Referring Practitioner: Dr Ama Amin  Subjective  Subjective: Pt in recliner upon arrival. Agreeable to therapy services. Pain Screening  Patient Currently in Pain: No  Vital Signs  Patient Currently in Pain: No       Orientation     Cognition   Cognition  Overall Cognitive Status: Exceptions  Arousal/Alertness: Delayed responses to stimuli  Following Commands: Follows one step commands with repetition; Follows one step commands with increased time  Attention Span: Attends with cues to redirect  Memory: Decreased recall of biographical Information;Decreased short term memory;Decreased long term memory;Decreased recall of recent events  Safety Judgement: Decreased awareness of need for safety;Decreased awareness of need for assistance  Problem Solving: Decreased awareness of errors;Assistance required to identify errors made;Assistance required to correct errors made  Insights: Not aware of deficits  Initiation: Requires cues for all  Sequencing: Requires cues for all  Cognition Comment: Increased processing time for verbal commands  Objective   Bed mobility  Rolling to Left: Maximum assistance  Rolling to Right: Dependent/Total  Comment: cues for initation and hand placement with rolling. Transfers  Sit to Stand: 2 Person Assistance;Maximum Assistance  Stand to sit: 2 Person Assistance;Dependent/Total  Bed to Chair: 2 Person Assistance;Dependent/Total  Comment: extensive (L) lateral lean in both seated prep and standing transfer completion. Pt does not fully extend knees during stands. Ambulation  Ambulation?: No  Stairs/Curb  Stairs?: No     Balance  Posture: Poor  Sitting - Static: Poor  Sitting - Dynamic: Poor;-  Standing - Static: Poor;-  Standing - Dynamic: Poor;-  Comments: consistent (L) lateral lean in all positions. Patient inconsistantly can self correct posture in seated position upon VCs. Requires max (A) to obtain and maintain midline position. Comment: Pt sitting in recliner on arrival and agreeable to therapy. Pt demonstrated improved initation with trunk control at the beginning of session, by flexing forward independently to assist with donning pants. Pt also demonstrated muscle activation in L LE with slight DF and slight elevation of foot off of the floor. Sit to stand was performed with maxAx3, x2 to get patient dressed and preped for skylift. Pt transfered to bed via skylift to continue donning clothes.  Pt transfered from bed to w/c via skylift. Pt performed oral hygine in bathroom while sitting. Pt stood in standing frame x 6 mins while playing catch with velcrow ball. Pt required maxA to pronate L UE to catch the ball and maxA to shift body weight onto the R LE during task. Pt performed a seated rest break before retruning to standing frame for an additional 3 mins. Pt returned to bed via skylift. Pt required maxA to perform bed mobility as documented above. Pillows were placed behind pts L hip, LE and L side trunk to help with proper positioning. Pt left with alarm on and all needs within reach.               G-Code     OutComes Score                                                     AM-PAC Score             Goals  Short term goals  Time Frame for Short term goals: 1-2 weeks  Short term goal 1: Pt will perform all bed mobility with maxA of 1  Short term goal 2: Pt will perform SPT with maxA of 1  Short term goal 3: Pt will ambulate 8' with LRAD with maxA of 1  Short term goal 4: Pt will negotiate 1 step with LRAD and maxA of 1  Long term goals  Time Frame for Long term goals : 2-3 weeks  Long term goal 1: Pt will perform all bed mobility with CGA  Long term goal 2: Pt will perform all transfers with CGA  Long term goal 3: Pt will ambulate 48' with LRAD CGA  Long term goal 4: Pt will negotiate 12 steps with kapil  Patient Goals   Patient goals : pt unable to assess    Plan    Plan  Times per week: 60 minutes a day 5 days a week  Current Treatment Recommendations: Strengthening, Transfer Training, Endurance Training, Neuromuscular Re-education, Patient/Caregiver Education & Training, ROM, Wheelchair Mobility Training, Manual Therapy - Soft Tissue Mobilization, Equipment Evaluation, Education, & procurement, Balance Training, Gait Training, Functional Mobility Training, Stair training, Safety Education & Training, Positioning, ADL/Self-care Training  Safety Devices  Type of devices: Call light within reach, Chair alarm in place, Left in chair, Telesitter in use, Gait belt, All fall risk precautions in place  Restraints  Initially in place: No     Therapy Time   Individual Concurrent Group Co-treatment   Time In       0830   Time Out       0930   Minutes       60   Timed Code Treatment Minutes: 60 Minutes     Timed Code Treatment Minutes:  60    Total Treatment Minutes:  [address], [facility]  Therapist was present, directed the patient's care, made skilled judgement, and was responsible for assessment and treatment of the patient.  Co-signed and supervised by:  Trinidad Moreno DPT [id number]

## 2021-07-14 PROCEDURE — 6370000000 HC RX 637 (ALT 250 FOR IP): Performed by: PHYSICAL MEDICINE & REHABILITATION

## 2021-07-14 PROCEDURE — 1280000000 HC REHAB R&B

## 2021-07-14 PROCEDURE — 97130 THER IVNTJ EA ADDL 15 MIN: CPT

## 2021-07-14 PROCEDURE — 97112 NEUROMUSCULAR REEDUCATION: CPT

## 2021-07-14 PROCEDURE — 6370000000 HC RX 637 (ALT 250 FOR IP): Performed by: NURSE PRACTITIONER

## 2021-07-14 PROCEDURE — 97530 THERAPEUTIC ACTIVITIES: CPT

## 2021-07-14 PROCEDURE — 97129 THER IVNTJ 1ST 15 MIN: CPT

## 2021-07-14 PROCEDURE — 6360000002 HC RX W HCPCS: Performed by: PHYSICAL MEDICINE & REHABILITATION

## 2021-07-14 PROCEDURE — 97535 SELF CARE MNGMENT TRAINING: CPT

## 2021-07-14 PROCEDURE — 92526 ORAL FUNCTION THERAPY: CPT

## 2021-07-14 RX ADMIN — ENOXAPARIN SODIUM 40 MG: 40 INJECTION SUBCUTANEOUS at 08:13

## 2021-07-14 RX ADMIN — LISINOPRIL 10 MG: 10 TABLET ORAL at 20:05

## 2021-07-14 RX ADMIN — TAMSULOSIN HYDROCHLORIDE 0.4 MG: 0.4 CAPSULE ORAL at 08:14

## 2021-07-14 RX ADMIN — ASPIRIN 81 MG: 81 TABLET, CHEWABLE ORAL at 08:09

## 2021-07-14 RX ADMIN — ATORVASTATIN CALCIUM 40 MG: 40 TABLET, FILM COATED ORAL at 20:05

## 2021-07-14 RX ADMIN — ESCITALOPRAM OXALATE 5 MG: 10 TABLET ORAL at 08:13

## 2021-07-14 RX ADMIN — ATENOLOL 25 MG: 50 TABLET ORAL at 08:14

## 2021-07-14 RX ADMIN — LISINOPRIL 10 MG: 10 TABLET ORAL at 08:09

## 2021-07-14 RX ADMIN — FINASTERIDE 5 MG: 5 TABLET, FILM COATED ORAL at 08:14

## 2021-07-14 ASSESSMENT — PAIN SCALES - GENERAL
PAINLEVEL_OUTOF10: 0
PAINLEVEL_OUTOF10: 0

## 2021-07-14 NOTE — PLAN OF CARE
Problem: Falls - Risk of:  Goal: Will remain free from falls  Description: Will remain free from falls  7/13/2021 2146 by Destinee Darby RN  Outcome: Ongoing   Education Provided as followed:  \"Family/Patient made aware of the tailored interventions falls plan using the TIPS TOOL.    Problem: Injury - Risk of, Physical Injury:  Goal: Will remain free from falls  Description: Will remain free from falls  7/13/2021 2146 by Destinee Darby RN  Outcome: Ongoing

## 2021-07-14 NOTE — PROGRESS NOTES
Occupational Therapy  Facility/Department: [facility] ACUTE REHAB UNIT  Daily Treatment Note  NAME: Chiquis Hopson  : 1935  MRN: [id number]    Date of Service: 2021    Discharge Recommendations:  Home with Home health OT, S Level 3, Home with nursing aide, 24 hour supervision or assist  OT Equipment Recommendations  Equipment Needed: Yes  Other: Iris Bj lift, hospital bed, BSC if family prefers over bedpan    Assessment   Performance deficits / Impairments: Decreased functional mobility ; Decreased endurance;Decreased coordination;Decreased posture;Decreased ADL status; Decreased ROM; Decreased strength;Decreased balance;Decreased vision/visual deficit; Decreased high-level IADLs;Decreased safe awareness;Decreased cognition;Decreased fine motor control  Assessment: 81 y.o. male presents w/ the above deficits which are impacting his occupational performance. Pt would benefit from continued therapy during inpatient stay in order to maximize safety and independence upon discharge. Treatment Diagnosis: Multiple performance deficits s/p CVA  OT Education: OT Role;Plan of Care;ADL Adaptive Strategies;Transfer Training;Orientation; Energy Conservation  Patient Education: Pt is not an independent learner  Barriers to Learning: Cognition  REQUIRES OT FOLLOW UP: Yes  Activity Tolerance  Activity Tolerance: Patient Tolerated treatment well  Activity Tolerance: Pt continues to demo L lateral lean and pushing behavior requiring increased physical assistance to correct. Safety Devices  Safety Devices in place: Yes  Type of devices: All fall risk precautions in place;Call light within reach;Gait belt;Patient at risk for falls;Nurse notified; Left in chair;Chair alarm in place         Patient Diagnosis(es): CVA     has a past medical history of Dementia ([address] HTN (hypertension), Hyperlipidemia, and PONV (postoperative nausea and vomiting). has a past surgical history that includes Knee arthroscopy;  Colonoscopy; eye surgery (Right, 11/2014); Cystoscopy (04/04/2018); and TURP (04/26/2018). Restrictions  Restrictions/Precautions  Restrictions/Precautions: Fall Risk, Modified Diet  Required Braces or Orthoses?: No  Position Activity Restriction  Other position/activity restrictions: 81year old male with a history of CAD, dementia, PVD who was admitted to [facility] on 6/19 with left-sided weakness. He was recently evaluated at this facility for an episode of syncope. Work-up revealed a right watershed ischemic stroke in the right frontal and parietal regions. He was initiated on Plavix in addition to aspirin and statin. A1c 5.3. LDL 88. Echo was unremarkable for an embolic source. He was evaluated by therapy and suggested to continue in an inpatient setting prior to returning home. He has no current complaints today. He is currently sitting up with therapy and having intermittent episodes of increased respirations with decreased heart rate. Subjective   General  Chart Reviewed: Yes  Patient assessed for rehabilitation services?: Yes  Response to previous treatment: Patient with no complaints from previous session  Family / Caregiver Present: No  Diagnosis: CVA  Subjective  Subjective: Pt supine in bed upon entry, agreeable to OT/PT cotx  Vital Signs  Patient Currently in Pain: No     Objective    ADL  UE Dressing: Setup;Dependent/Total  LE Dressing: Setup;Dependent/Total  Additional Comments: Completed LB dressing while supine in bed rolling L/R. Complete UB dressing/grooming while seated in recliner. Balance  Sitting Balance: Dependent/Total  Bed mobility  Rolling to Left: Maximum assistance  Rolling to Right: Dependent/Total  Supine to Sit: Dependent/Total;2 Person assistance  Sit to Supine: Dependent/Total;2 Person assistance      Cognition  Overall Cognitive Status: Exceptions  Arousal/Alertness: Delayed responses to stimuli  Following Commands: Follows one step commands with repetition; Follows one step met, progressing  Short term goal 5: UB dressing w/ Min A-- not met, progressing  Long term goals  Time Frame for Long term goals : 3-4 weeks  Long term goal 1: Functional transfer for ADL completion w/ supervision-- not met, progressing  Long term goal 2: Bathing w/ supervision-- not met, progressing  Long term goal 3: LB dressing w/ supervision-- not met, progressing  Long term goal 4: Toileting w/ supervision-- not met, progressing  Long term goal 5: UB dressing w/ supervision-- not met, progressing  Patient Goals   Patient goals :  To go home       Therapy Time   Individual Concurrent Group Co-treatment   Time In       1330   Time Out       1430   Minutes       60        Timed Code Treatment Minutes:  60 Minutes    Total Treatment Minutes:  60 minutes       Rhona Zee, OT   Rhona Zee OTR/L, [place] #[id number]

## 2021-07-14 NOTE — PROGRESS NOTES
Nila Howell  7/14/2021  [id number]    Chief Complaint: Acute embolic stroke [facility])    Subjective:   Resting in bedside chair this am. More interactive this am. Denies current complaints. ROS: No CP, SOB, dyspnea    Objective:  Patient Vitals for the past 24 hrs:   BP Temp Temp src Pulse Resp SpO2   07/14/21 0745 (!) 159/76 98.3 °F (36.8 °C) Oral 58 14 94 %   07/13/21 2130 (!) 159/85 97.9 °F (36.6 °C) Oral 53 16 94 %     Gen: No distress, pleasant, flat affect. Resting in bedside chair  HEENT: Normocephalic, atraumatic  CV: Regular rate and rhythm. No MRG   Resp: No respiratory distress. CTAB. Abd: Soft, nontender nondistended  Ext: No edema. Neuro: Alert, poor initiation, appropriately interactive. Left neglect    Laboratory data: Available via EMR. Therapy progress:  PT  Position Activity Restriction  Other position/activity restrictions: 81year old male with a history of CAD, dementia, PVD who was admitted to [facility] on 6/19 with left-sided weakness. He was recently evaluated at this facility for an episode of syncope. Work-up revealed a right watershed ischemic stroke in the right frontal and parietal regions. He was initiated on Plavix in addition to aspirin and statin. A1c 5.3. LDL 88. Echo was unremarkable for an embolic source. He was evaluated by therapy and suggested to continue in an inpatient setting prior to returning home. He has no current complaints today. He is currently sitting up with therapy and having intermittent episodes of increased respirations with decreased heart rate. Objective     Sit to Stand: 2 Person Assistance, Maximum Assistance  Stand to sit: 2 Person Assistance, Dependent/Total  Bed to Chair: 2 Person Assistance, Dependent/Total     OT  PT Equipment Recommendations  Equipment Needed: Yes  Other: TBD with progress.   Currently, pt will require a sukhwinder lift and manual w/c  Toilet - Technique: Stand pivot  Equipment Used: Extra wide bedside commode  Toilet Transfers Comments: Max A x2  Assessment        SLP                Body mass index is 29.08 kg/m². Assessment:  Patient Active Problem List   Diagnosis    Essential hypertension    Hyperlipidemia    Palpitations    Family history of premature CAD    Shoulder pain    Knee pain    Late onset Alzheimer's disease without behavioral disturbance (HCC)    Acute renal failure (ARF) (HCC)    Benign prostatic hyperplasia (BPH) with post-void dribbling    Near syncope    Dementia due to Alzheimer's disease ([facility] [address]    Acute embolic stroke ([facility] [address]       Plan:   Right frontal and parietal watershed infarcts: ASA, statin. Plavix for 21 days (7/10). PT/OT/SLP. Event monitor in place. Started Lexapro for motor recovery     Dysphagia: dysphagia diet, SLP    Intermittent tachypnea: HR decreased during episodes. Sats stable during episodes. Consider seizure activity vs central etiology as alternative options. Consulted pulm, appreciate assistance. CAD: ASA, statin      HTN: atenolol, lisinopril 10 BID     Dementia: SLP     Urinary retention: flomax, proscar. PNA: suspect aspiration related. Levaquin completed     Bowels: Per protocol  Bladder: Per protocol   Sleep: Trazodone provided prn. Pain: tylenol, tramadol   DVT PPx: Lovenox   SANTIAGO: 7/16    Electronically signed by KELECHI Raman CNP on 7/14/2021 at 9:51 AM    The patient was seen in conjunction with the nurse practitioner with independent history, evaluation and examination performed on the same day as her encounter. I agree with the note which has been adjusted to reflect my findings. I have had face to face contact with the patient and performed a substantive portion of the E/M visit. Alan Corrigan MD 7/14/2021, 10:37 AM

## 2021-07-14 NOTE — PROGRESS NOTES
ACUTE REHAB UNIT  SPEECH/LANGUAGE PATHOLOGY       [x] Daily  [] Weekly Care Conference Note  [] Discharge    Patient:Reno Elias     :1935  [facility]:[id number]  Room #: GMD-5149/2936-02   Rehab Dx/Hx: Acute embolic stroke [facility]) [R03.9]  Date of Admit: 2021      Precautions: falls and aspirations  Home situation: Per pt's daughter Sneha Rodriguez) pt lives with his grandson (22years old). Cornelia Post reports plan is for her and her family to move into his place to provide more assistance. ST Dx: Mild-moderate oropharyngeal dysphagia; Severe cognitive linguistic deficits; Moderate dysarthria / dysphonia    Daily Treatment Info:   Date: 2021     Tx session 1 Tx session 2   Pain Denied Denied   Subjective     Pt seen sitting in chair upon SLP arrival with breakfast meal. Pt continues with flat affect and has varied response times while easily distracted. Pt pleasant and cooperative throughout session. Pt seen sitting in chair upon SLP arrival. Pt seen lacing strings upon arrival and SLP asked pt to set to side until after session. Pt agreed and continued with lacing after SLP session. Pt continues with flat affect and has varied response time while easily distracted. Pt pleasant and cooperative throughout session. Objective:  Goals     Pt will tolerate recommended diet and advanced trials with use of compensatory swallow strategies provided with < mod cues with no overt clinical s/s of aspiration or noted difficulty. Pt seen with breakfast meal. Pt consumed soft solids (chopped pancakes, hash browns, and ground sausage) and tolerated them with no overt s/s of aspiration. Pt tolerated with no overt s/s of aspiration, however, remains impulsive and takes large bites with an increased rate. Pt seemingly tolerated hot thins in isolation (coffee) 4 times and 2 times in combination of other textures, when he took a drink prior to clearing food from oral cavity.  Pt presented with a change in vocal quality with an ongoing rough voice throughout. Suspect continued penetration with thin liquids based on pt's MBS (especially when thins are provided in combination with other textures) with pt producing intermittent changes in vocal quality throughout meal assessment. Pt was asked twice to cough to clear throat and swallow hard to improve vocal quality. Vocal quality was improved after this. Pt seen taking 2 bites of puree with meds in them (2 pills in one bite and 1 in the other). Pt seemingly tolerated with no s/s of aspiration. Goal not targeted this session. Pt will respond and/or initiate action upon verbal prompt within 3 seconds in 4 out of 5 opportunities without repetition.   - Pt did not respond x 3 during session when asked basic conversational questions    During breakfast meal, pt independently initiated self-feeding; however, he required verbal cues 3x during meal to re-initiate eating after distractions (ie. Nurse in room)   Giving the opposite in 3 seconds or less--  SLP read first 5 prompts while pt answered questions. Pt read 19 prompts independently. Pt also wrote all responses and spelled correctly. Writing was legible for all answers. Pt answered 20/24 (83%) independently in 3 seconds or less which increased to 24/24 with a min verbal cue. Pt needed cue of redirection to specific question he was on. It was noted pt was not able to verbalize answer and write in response on paper simultaneously. Pt will use visual aids/ strategies to state orientation to time, place, situation with 100% accuracy across 3 consecutive sessions.    Pt oriented to:  -time  -year, date, month, ABHINAV (cued to look at clock)  -gross approximation to date   -place (hospital)  -situation    Pt  disoriented to:  -name of hospital (stated [facility]) Pt disoriented to:  -d/c date       Pt will improve oral motor function, articulatory precision and breath support in connected speech via graded tasks to >80% acc with use of speech strategies. GOAL MET  GOAL MET    Pt will improve verbal initiation and thought organization for extended utterances (i.e. related to personal hx/ recall of recent events, etc.) >75% acc. Pt elaborated more today when given extra time and a min cue with further questioning.   -Pt initiated a verbal statement, \"you both have badges on that say 'rehab'\". Pt was unable to recall he is on the ARU. Fx Recall   -stated birthday correctly  -Pt able to recall d/c date when given a field choice of 3  - unable to recall fall from chair when RN asked (slipping out of chair) yesterday -with cue from SLP stating she thought she saw his daughter here pt able to name daughter that was present    Pt continues to answer with 1-2 word utterances and needs further cues to expand utterances but has elaborated with min cues on answers since previous date (especially when discussing previous career). Fx Recall  - Pt unable to recall upcoming d/c date  - Unable to recall seeing rehab MD this AM   - Unable to recall if any visitors are coming this date  - Able to recall 2 breakfast items independently. Able to recall coffee as a  breakfast item when asked if he had something to drink.  - Pt able to recall favorite surgery (knee) and explain why with min verbal cue of further questioning  - Pt stated least favorite surgery was shoulder (previous date was back). Pt able to explain reasons he disliked back and shoulder surgeries with further min verbal cues to explain reasoning.   -Pt able to recall he has 8 siblings (7 sisters and 1 brother)     Patient will complete fx problem solving tasks and demonstrate insight into limitations and impact on daily functioning with >75% acc, min cues. Pt was able to state the next therapy for the day along with the time it occurs with the use of a visual written schedule and a min tactile cue.   Pt needed mod tactile, verbal, and visual cues to answer what therapy is last seen today and what time. Pt needed mod/max cues to look ahead to next therapy schedule to say what time it was and type of therapy it was. Pt required cueing in 1/2 opportunities to request have back scratched. In the other 1/2 opportunities, pt requested to be scratched on shoulder blade. Pt was able to direct SLP to various spots on back to be scratched independently. Other areas targeted: Filled out daily schedule with date, therapy times, and reason for hospitalization to assist pt with orientation information and short-term recall/prospective memory. Updated schedule to reflect this treatment session ended. Reviewed daily schedule and again updated to reflect completed treatment sessions for the day. Pt had one episode of SOB/rapid breathing pattern at rest during session. Recorded on justina for pt's monitor. Education:   Provided education re rationale for treatment and plan of care. Pt has limited comprehension and requires ongoing simplified education. Provided education re rationale for treatment and plan of care. Pt with limited comprehension, requires ongoing, simplified education. Safety Devices: [x] Call light within reach  [x] Chair alarm activated  [] Bed alarm activated  [x] Other: Camera in place for safety  [x] Call light within reach  [x] Chair alarm activated  [] Bed alarm activated  [x] Other: Camera in place for safety     Assessment:   Speech Therapy Diagnosis  Cognitive Diagnosis: Pt presents with severe cognitive linguistic deficits due to reduced orienation, reduced processing speed, reduced insight into current situation, reduced immediate/ working/ short term memory with fx problem solving difficulty. Per chart review pt with baseline dementia, did not complete IADLs at home prior to CVA but was able to be safely left alone with daily check ins from family. Aphasia Diagnosis: Significantly reduced processing speed/ difficulty following multi-step commands.  Unable to discern if due to pt being [place] vs reduced auditory comprehension/ initiation, requires further assessment. Pt c/o word finding diffiuclty, pt with minimal verbal output during evaluation, requires ongoing assessment for goal generation. Speech Diagnosis: Moderate dysarthria/ dysphonia, pt with intermittent periods of tachypnea and reduced breath support impacting intelligibility at the short phrase/ sentence level with reduced articulatory precision, pt with hoarse, gravely wet vocal quality    Current Diet Order: ADULT DIET; Dysphagia - Soft and Bite Sized; Mildly Thick (Nectar)            Plan:     Frequency:  5days/week   60 minutes/day    Discharge Recommendations:   Barriers: Pre-existing cognitive deficit; severity of deficits; Limited initiation  Discharge Recommendations:  [] Home independently  [x] Home with assistance [x]  24 hour supervision  [] SNF [x] Other: TBD  Continued SLP Treatment:  [x] Yes [] No [] TBD based on progress while on ARU [] Vital Stim indicated [] Other:   Estimated discharge date: 7/16/21    Type of Total Treatment Minutes   Session 1   Session 2   Time In 0800 0930   Time Out 0830 1000   Timed Code Minutes  10 30   Individual Treatment Minutes  30 30   Co-Treatment Minutes      Group Treatment Minutes      Concurrent Treatment Minutes        TOTAL DAILY MINUTES:  60    Electronically Signed by     Charlee Bradshaw M.S. [address] Pathologist  7/14/2021 12:21 PM     The speech-language pathologist was present, directed the patient's care, made skilled judgment and was responsible for assessment and treatment.     Siobhan Jose,  Speech-Language Pathology

## 2021-07-14 NOTE — PLAN OF CARE
Assisted patient to bed,with the hubert lift, patient incontinent of urine, changed depends and applied zinc to his buttocks, buttocks reddened,

## 2021-07-15 LAB
ANION GAP SERPL CALCULATED.3IONS-SCNC: 6 MMOL/L (ref 3–16)
BUN BLDV-MCNC: 15 MG/DL (ref 7–20)
CALCIUM SERPL-MCNC: 9 MG/DL (ref 8.3–10.6)
CHLORIDE BLD-SCNC: 107 MMOL/L (ref 99–110)
CO2: 28 MMOL/L (ref 21–32)
CREAT SERPL-MCNC: 0.9 MG/DL (ref 0.8–1.3)
GFR AFRICAN AMERICAN: >60
GFR NON-AFRICAN AMERICAN: >60
GLUCOSE BLD-MCNC: 100 MG/DL (ref 70–99)
HCT VFR BLD CALC: 37 % (ref 40.5–52.5)
HEMOGLOBIN: 12.3 G/DL (ref 13.5–17.5)
MCH RBC QN AUTO: 29.4 PG (ref 26–34)
MCHC RBC AUTO-ENTMCNC: 33.1 G/DL (ref 31–36)
MCV RBC AUTO: 88.8 FL (ref 80–100)
PDW BLD-RTO: 14.7 % (ref 12.4–15.4)
PLATELET # BLD: 334 K/UL (ref 135–450)
PMV BLD AUTO: 8.2 FL (ref 5–10.5)
POTASSIUM SERPL-SCNC: 4.2 MMOL/L (ref 3.5–5.1)
RBC # BLD: 4.17 M/UL (ref 4.2–5.9)
SODIUM BLD-SCNC: 141 MMOL/L (ref 136–145)
WBC # BLD: 8.1 K/UL (ref 4–11)

## 2021-07-15 PROCEDURE — 1280000000 HC REHAB R&B

## 2021-07-15 PROCEDURE — 97129 THER IVNTJ 1ST 15 MIN: CPT

## 2021-07-15 PROCEDURE — 92526 ORAL FUNCTION THERAPY: CPT

## 2021-07-15 PROCEDURE — 6370000000 HC RX 637 (ALT 250 FOR IP): Performed by: NURSE PRACTITIONER

## 2021-07-15 PROCEDURE — 97130 THER IVNTJ EA ADDL 15 MIN: CPT

## 2021-07-15 PROCEDURE — 6360000002 HC RX W HCPCS: Performed by: PHYSICAL MEDICINE & REHABILITATION

## 2021-07-15 PROCEDURE — 97530 THERAPEUTIC ACTIVITIES: CPT

## 2021-07-15 PROCEDURE — 6370000000 HC RX 637 (ALT 250 FOR IP): Performed by: PHYSICAL MEDICINE & REHABILITATION

## 2021-07-15 PROCEDURE — 97535 SELF CARE MNGMENT TRAINING: CPT

## 2021-07-15 PROCEDURE — 85027 COMPLETE CBC AUTOMATED: CPT

## 2021-07-15 PROCEDURE — 36415 COLL VENOUS BLD VENIPUNCTURE: CPT

## 2021-07-15 PROCEDURE — 80048 BASIC METABOLIC PNL TOTAL CA: CPT

## 2021-07-15 RX ORDER — SENNA PLUS 8.6 MG/1
2 TABLET ORAL NIGHTLY
Status: DISCONTINUED | OUTPATIENT
Start: 2021-07-15 | End: 2021-07-16 | Stop reason: HOSPADM

## 2021-07-15 RX ADMIN — ATENOLOL 25 MG: 50 TABLET ORAL at 07:48

## 2021-07-15 RX ADMIN — ASPIRIN 81 MG: 81 TABLET, CHEWABLE ORAL at 07:48

## 2021-07-15 RX ADMIN — ATORVASTATIN CALCIUM 40 MG: 40 TABLET, FILM COATED ORAL at 21:25

## 2021-07-15 RX ADMIN — ENOXAPARIN SODIUM 40 MG: 40 INJECTION SUBCUTANEOUS at 07:48

## 2021-07-15 RX ADMIN — ESCITALOPRAM OXALATE 5 MG: 10 TABLET ORAL at 07:48

## 2021-07-15 RX ADMIN — TAMSULOSIN HYDROCHLORIDE 0.4 MG: 0.4 CAPSULE ORAL at 07:48

## 2021-07-15 RX ADMIN — LISINOPRIL 10 MG: 10 TABLET ORAL at 07:48

## 2021-07-15 RX ADMIN — LISINOPRIL 10 MG: 10 TABLET ORAL at 21:24

## 2021-07-15 RX ADMIN — FINASTERIDE 5 MG: 5 TABLET, FILM COATED ORAL at 07:48

## 2021-07-15 RX ADMIN — SENNOSIDES 17.2 MG: 8.6 TABLET, FILM COATED ORAL at 21:24

## 2021-07-15 ASSESSMENT — PAIN SCALES - GENERAL
PAINLEVEL_OUTOF10: 0
PAINLEVEL_OUTOF10: 0

## 2021-07-15 NOTE — PLAN OF CARE
Problem: Falls - Risk of:  Goal: Will remain free from falls  Description: Will remain free from falls  Outcome: Ongoing  Note: Fall precautions in place: bed locked and in lowest position, bed alarm on, 2/4 side rails raised, non-slip socks on, call light and overhead table within reach, patient knows when to appropriately call out for help. Goal: Absence of physical injury  Description: Absence of physical injury  Outcome: Ongoing     Problem: Skin Integrity:  Goal: Will show no infection signs and symptoms  Description: Will show no infection signs and symptoms  Outcome: Ongoing  Note: Zinc paste applied to sunshine area. Goal: Absence of new skin breakdown  Description: Absence of new skin breakdown  Outcome: Ongoing     Problem: Confusion - Acute:  Goal: Absence of continued neurological deterioration signs and symptoms  Description: Absence of continued neurological deterioration signs and symptoms  Outcome: Ongoing  Goal: Mental status will be restored to baseline  Description: Mental status will be restored to baseline  Outcome: Ongoing     Problem: Discharge Planning:  Goal: Ability to perform activities of daily living will improve  Description: Ability to perform activities of daily living will improve  Outcome: Ongoing  Goal: Participates in care planning  Description: Participates in care planning  Outcome: Ongoing     Problem: Injury - Risk of, Physical Injury:  Goal: Will remain free from falls  Description: Will remain free from falls  Outcome: Ongoing  Note: Fall precautions in place: bed locked and in lowest position, bed alarm on, 2/4 side rails raised, non-slip socks on, call light and overhead table within reach, patient knows when to appropriately call out for help.    Goal: Absence of physical injury  Description: Absence of physical injury  Outcome: Ongoing     Problem: Mood - Altered:  Goal: Mood stable  Description: Mood stable  Outcome: Ongoing  Goal: Absence of abusive behavior  Description: Absence of abusive behavior  Outcome: Ongoing  Goal: Verbalizations of feeling emotionally comfortable while being cared for will increase  Description: Verbalizations of feeling emotionally comfortable while being cared for will increase  Outcome: Ongoing     Problem: Psychomotor Activity - Altered:  Goal: Absence of psychomotor disturbance signs and symptoms  Description: Absence of psychomotor disturbance signs and symptoms  Outcome: Ongoing     Problem: Sensory Perception - Impaired:  Goal: Demonstrations of improved sensory functioning will increase  Description: Demonstrations of improved sensory functioning will increase  Outcome: Ongoing  Goal: Decrease in sensory misperception frequency  Description: Decrease in sensory misperception frequency  Outcome: Ongoing  Goal: Able to refrain from responding to false sensory perceptions  Description: Able to refrain from responding to false sensory perceptions  Outcome: Ongoing  Goal: Demonstrates accurate environmental perceptions  Description: Demonstrates accurate environmental perceptions  Outcome: Ongoing  Goal: Able to distinguish between reality-based and nonreality-based thinking  Description: Able to distinguish between reality-based and nonreality-based thinking  Outcome: Ongoing  Goal: Able to interrupt nonreality-based thinking  Description: Able to interrupt nonreality-based thinking  Outcome: Ongoing     Problem: Sleep Pattern Disturbance:  Goal: Appears well-rested  Description: Appears well-rested  Outcome: Ongoing     Problem: IP BLADDER/VOIDING  Goal: LTG - Patient will utilize adaptive techniques/equipment to complete bladder elimination  Outcome: Ongoing  Goal: STG - Patient demonstrates no accidents  Outcome: Ongoing  Goal: STG - Patient will state signs and symptoms of UTI  Outcome: Ongoing  Goal: STG - patient will be able to empty bladder  Outcome: Ongoing     Problem: IP BOWEL ELIMINATION  Goal: LTG - patient will have regular and routine bowel evacuation  Outcome: Ongoing     Problem: Neurological  Goal: Maximum potential motor/sensory/cognitive function  Outcome: Ongoing     Problem: Pain:  Goal: Pain level will decrease  Description: Pain level will decrease  Outcome: Ongoing  Goal: Control of acute pain  Description: Control of acute pain  Outcome: Ongoing  Goal: Control of chronic pain  Description: Control of chronic pain  Outcome: Ongoing

## 2021-07-15 NOTE — PROGRESS NOTES
ACUTE REHAB UNIT  SPEECH/LANGUAGE PATHOLOGY       [x] Daily  [] Weekly Care Conference Note  [x] Discharge    Patient:Reno Tony     :1935  AZO:[id number]  Room #: WUU-5168/8313-35   Rehab Dx/Hx: Acute embolic stroke [facility]) [D08.2]  Date of Admit: 2021      Precautions: falls and aspirations  Home situation: Per pt's daughter Lynn Nayak) pt lives with his grandson (22years old). Itz Boone reports plan is for her and her family to move into his place to provide more assistance. ST Dx: Mild-moderate oropharyngeal dysphagia; Severe cognitive linguistic deficits; Moderate dysarthria / dysphonia    Daily Treatment Info:   Date: 7/15/2021     Tx session 1 Tx session 2   Pain Denied Denied   Subjective     Pt seen sitting in chair, alert and cooperative. Pt positioned at midline with pillows for support on L side and able to remain in midline position. Pt continues with delayed responses. Pt's daughter Lynn Nayak) present during last ~10 minutes of session) Pt sitting upright in wheelchair following PT/OT session. Two of pt's daughters present at beginning of session; one left a few minutes after session began. Pt again with delayed response times, but he was alert and cooperative during session. Discharge Summary: Pt has made slow progress towards goals. Pt is tolerating a Dysphagia III Soft and Bite-Sized diet with mildly (nectar) thick liquids (with staff assisted [employer] Protocol). He demonstrates inconsistent wet vocal quality of thins in combination with solids. Pt has been more consistently alert in sessions but continues with delayed initiation/response times. He benefits from orientation aids. Pt is more aware of CVA as reason for hospital stay but demonstrates reduced insight into deficits.  He would benefit from continued speech therapy upon d/c.   Objective:  Goals     Pt will tolerate recommended diet and advanced trials with use of compensatory swallow strategies provided with < mod cues with no overt clinical s/s of aspiration or noted difficulty. Pt seen with breakfast meal consisting of chopped pancakes, scrambled eggs, and chopped sausage. SLP provided pt with trial of thin liquid water and coffee during breakfast meal (pt mostly drank coffee). Pt overall eating at more appropriate pace this session but demonstrated use of large bites and not clearing oral cavity between bites. Provided verbal cues, but pt was only responsive to cueing occasionally. No overt s/s aspiration of solids. Pt tolerated hot thin liquid in 5/5 trials in combination with other textures. Pt also tolerated thins in isolation without overt s/s aspiration. One instance of wet vocal quality noted during meal which did not appear to be related to a specific texture. Pt was able to follow verbal cue for glottal attack/re-swallow technique, which then lead to clear vocal quality. Reviewed general results of MBS with pt's daughter and discussed diet recommendations upon d/c. Recommended that pt continue with Dysphagia III Soft and Bite-Sized diet with mildly thick liquids; however, allow pt to have thin liquids (of any kid) between meals, as long as oral care is completed routinely and family ensure pt is alert, completely upright in chair, and taking small/single sips. Daughter v/u. SLP provided extensive education to pt's other daughter Sabrina Pole) re: MBS results, risk of aspiration, diet recommendations, allowance of thin liquids between meals (of any kind), and swallow strategies/aspiration precautions. Provided pt's daughter with handout with the above recommendations. She verbalized understanding of all education provided.      GOAL MET (pt is tolerating recommended diet; however, recommend ongoing dysphagia therapy at d/c to ensure diet tolerance and other texture trials to assess if appropriate for possible eventual diet upgrade--recommend continue Dysphagia III Soft and Bite-Sized with mildly (nectar) thick liquids at this time; however, allow thin liquids (of any kind) between meals). Pt will respond and/or initiate action upon verbal prompt within 3 seconds in 4 out of 5 opportunities without repetition. Pt did not respond x 4 during session when asked basic conversational questions    During breakfast meal, pt independently initiated self-feeding; however, he required verbal cues 1x during meal to re-initiate eating after distraction. Word associations:  -pt able to verbalize/fill in blank of common word associations (with visual f/3 if needed) with 100% accuracy  -pt able to provide verbal response for word associations within 3 seconds in 75% of opportunities (9/12)    GOAL NOT MET     Pt will use visual aids/ strategies to state orientation to time, place, situation with 100% accuracy across 3 consecutive sessions. Pt initially unable to answer temporal orientation questions--he was noted to look around room for answer but unable to locate where to find answers. SLP cued pt to look at digital orientation clock in room. Pt then oriented to:  -month, date, ABHINAV, year  -time of day  -place [facility])  -reason    Pt  disoriented to:  -city   Using daily schedule in room, pt oriented to month, ABHINAV, date, and year with min verbal cues. GOAL NOT MET   Pt will improve oral motor function, articulatory precision and breath support in connected speech via graded tasks to >80% acc with use of speech strategies. GOAL MET  GOAL MET    Pt will improve verbal initiation and thought organization for extended utterances (i.e. related to personal hx/ recall of recent events, etc.) >75% acc. Fx Recall   -unable to recall d/c date despite f/2 choices  -after discussing d/c planned for tomorrow, pt able to recall when MD asked that d/c is coming up. 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 Fx Recall  -pt recalled upcoming d/c date as \"soon\"   -unable to recall day of discharge given verbal f/2 choices  -did not recall family training in prior PT/OT session that had just finished    GOAL NOT MET     Patient will complete fx problem solving tasks and demonstrate insight into limitations and impact on daily functioning with >75% acc, min cues. Pt was able to state the next therapy for the day along with the time it occurs with the use of a visual written schedule and a min tactile cue. Pt needed mod tactile, verbal, and visual cues to answer what therapy is last seen today and what time. Using daily schedule, pt able to answer 1/4 questions (25%) about his day independently. Given mod cues, pt improved to 50% (2/4)    Reviewed sequence for opening pt's phone and calling important contacts (ie. Daughters). Pt able to follow sequence with mod verbal cues. GOAL NOT MET     Other areas targeted: Filled out daily schedule with date and therapy times to assist pt with orientation information and short-term recall/prospective memory. Reviewed schedule with pt. Discussed cognitively stimulating tasks pt can continue at home. Daughter indicated they keep a calendar and write down anything that happens during the day, who visited pt, what he did that day, etc for pt to review. SLP encouraged daughter to have family continue this and to keep schedules for upcoming birthdays, appointments, etc.    Education:   Provided education re rationale for treatment and plan of care. Pt has limited comprehension and requires ongoing simplified education. Educated daughter re: dysphagia recommendations; she verbalized understanding. Provided education re rationale for treatment and plan of care. Pt with limited comprehension, requires ongoing, simplified education. Educated pt re: dysphagia recommendations and cognitively stimulating tasks; she verbalized understanding.    Safety Devices: [x] Call light within reach  [x] Chair alarm activated  [] Bed alarm activated  [x] Other: Camera in place for safety  [x] Call light within reach  [x] Chair alarm activated  [] Bed alarm activated  [x] Other: Camera in place for safety     Assessment:   Speech Therapy Diagnosis  Cognitive Diagnosis: Pt presents with severe cognitive linguistic deficits due to reduced orienation, reduced processing speed, reduced insight into current situation, reduced immediate/ working/ short term memory with fx problem solving difficulty. Per chart review pt with baseline dementia, did not complete IADLs at home prior to CVA but was able to be safely left alone with daily check ins from family. Aphasia Diagnosis: Significantly reduced processing speed/ difficulty following multi-step commands. Unable to discern if due to pt being [place] vs reduced auditory comprehension/ initiation, requires further assessment. Pt c/o word finding diffiuclty, pt with minimal verbal output during evaluation, requires ongoing assessment for goal generation. Speech Diagnosis: Moderate dysarthria/ dysphonia, pt with intermittent periods of tachypnea and reduced breath support impacting intelligibility at the short phrase/ sentence level with reduced articulatory precision, pt with hoarse, gravely wet vocal quality    Current Diet Order: ADULT DIET; Dysphagia - Soft and Bite Sized; Mildly Thick (Nectar)            Plan:     Frequency:  5days/week   60 minutes/day    Discharge Recommendations:   Barriers: Pre-existing cognitive deficit; severity of deficits;  Limited initiation  Discharge Recommendations:  [] Home independently  [x] Home with assistance [x]  24 hour supervision  [] SNF [] Other:   Continued SLP Treatment:  [x] Yes [] No [] TBD based on progress while on ARU [] Vital Stim indicated [] Other:   Estimated discharge date: 7/16/21    Type of Total Treatment Minutes   Session 1   Session 2   Time In 0800 0930   Time Out 0830 1000   Timed Code MICHAELL  41 08 Individual Treatment Minutes  30 30   Co-Treatment Minutes      Group Treatment Minutes      Concurrent Treatment Minutes        TOTAL DAILY MINUTES:  60    Electronically Signed by     HONG Baker Pathologist  7/15/2021 1:53 PM

## 2021-07-15 NOTE — PROGRESS NOTES
Occupational Therapy  Facility/Department: [facility] ACUTE REHAB UNIT  Daily Treatment Note & Discharge Summary  NAME: Lucas Mitchell  : 1935  MRN: [id number]    Date of Service: 7/15/2021    Discharge Recommendations:  Home with Home health OT, S Level 3, Home with nursing aide, 24 hour supervision or assist  OT Equipment Recommendations  Equipment Needed: Yes  Other: [facility] lift, hospital bed, bed pan    Assessment   Performance deficits / Impairments: Decreased functional mobility ; Decreased endurance;Decreased coordination;Decreased posture;Decreased ADL status; Decreased ROM; Decreased strength;Decreased balance;Decreased vision/visual deficit; Decreased high-level IADLs;Decreased safe awareness;Decreased cognition;Decreased fine motor control  Assessment: Pt continues to require 2 person assist for all bed mobility, transfers, and ADL completion. Pt is limited d/t L lateral lean and pushing behavior. Recommend family utilizes sukhwinder lift at home to ensure safety w/ transfers. Pt would benefit from [facility] OT once discharged from ARU  Treatment Diagnosis: Multiple performance deficits s/p CVA  OT Education: OT Role;Plan of Care;ADL Adaptive Strategies;Transfer Training;Orientation; Energy Conservation  Patient Education: Pt is not an independent learner  Barriers to Learning: Cognition  REQUIRES OT FOLLOW UP: Yes  Activity Tolerance  Activity Tolerance: Patient Tolerated treatment well  Activity Tolerance: Pt continues to demo L lateral lean and pushing behavior requiring increased physical assistance to correct. Safety Devices  Safety Devices in place: Yes  Type of devices: All fall risk precautions in place;Call light within reach;Gait belt;Patient at risk for falls;Nurse notified; Left in chair;Chair alarm in place         Patient Diagnosis(es): CVA     has a past medical history of Dementia ([address] HTN (hypertension), Hyperlipidemia, and PONV (postoperative nausea and vomiting).    has a past surgical history that includes Knee arthroscopy; Colonoscopy; eye surgery (Right, 11/2014); Cystoscopy (04/04/2018); and TURP (04/26/2018). Restrictions  Restrictions/Precautions  Restrictions/Precautions: Fall Risk, Modified Diet  Required Braces or Orthoses?: No  Position Activity Restriction  Other position/activity restrictions: 81year old male with a history of CAD, dementia, PVD who was admitted to [facility] on 6/19 with left-sided weakness. He was recently evaluated at this facility for an episode of syncope. Work-up revealed a right watershed ischemic stroke in the right frontal and parietal regions. He was initiated on Plavix in addition to aspirin and statin. A1c 5.3. LDL 88. Echo was unremarkable for an embolic source. He was evaluated by therapy and suggested to continue in an inpatient setting prior to returning home. He has no current complaints today. He is currently sitting up with therapy and having intermittent episodes of increased respirations with decreased heart rate. Subjective   General  Chart Reviewed: Yes  Patient assessed for rehabilitation services?: Yes  Response to previous treatment: Patient with no complaints from previous session  Family / Caregiver Present: Yes (2 daughters present for family training)  Diagnosis: CVA  Subjective  Subjective: Pt seatd in recliner upon entry, agreeable to OT/PT cotx  Vital Signs  Patient Currently in Pain: No     Objective    ADL  Feeding: Supervision  Grooming: Setup;Minimal assistance (oral care while seated in w/c)  UE Bathing: Setup; Moderate assistance  LE Bathing: Setup;Maximum assistance  UE Dressing: Setup;Maximum assistance  LE Dressing: Setup;Dependent/Total  Toileting: Dependent/Total  Additional Comments: Completed UB bathing and dressing while seated in recliner for trunk support in sitting. Alicia lift>supine position in bed. Completed LB bathing/dressing while supine in bed, rolling L/R for LB clothing management.  Alicia lift>w/c Balance  Sitting Balance: Dependent/Total  Standing Balance: Dependent/Total     Transfers  Sit to stand: Dependent/Total  Stand to sit: Dependent/Total  Transfer Comments: Via sukhwinder lift     Cognition  Overall Cognitive Status: Exceptions  Arousal/Alertness: Delayed responses to stimuli  Following Commands: Follows one step commands with repetition; Follows one step commands with increased time  Attention Span: Attends with cues to redirect  Memory: Decreased recall of biographical Information;Decreased short term memory;Decreased long term memory;Decreased recall of recent events  Safety Judgement: Decreased awareness of need for safety;Decreased awareness of need for assistance  Problem Solving: Decreased awareness of errors;Assistance required to identify errors made;Assistance required to correct errors made  Insights: Not aware of deficits  Initiation: Requires cues for all  Sequencing: Requires cues for all  Cognition Comment: Increased processing time for verbal commands. Comment: Pt's daughter's present for family training this date. Discussed current diet level including need for thickened liquids, upright posture for meals. Discussed barriers to mobility, standing, unsupported sitting at this time including L lateral lean and pushing behavior. Stated that it would be unsafe to place pt on a BSC or shower chair at this time d/t poor sitting balance (pt currently requires total assist for support in sitting). Discussed completing bathing/dressing while pt is long sitting in bed so that he can participate in a more supported position, this includes completing toileting from a bedpan in bed d/t inability to sit unsupported. Pt's daughters preferred to observe during education session instead of physically assisting pt. Discussed use of sukhwinder lift, including placing pads and control system. Pt's daughters verbalized understanding.  Deferred to social work for questions regarding private duty home care list and transport home. All questions/concerns answered during session. Plan   Plan  Times per week: D/C home w/ 24 hour 2 person assist & HH OT S3  Times per day: Daily  Current Treatment Recommendations: Strengthening, Patient/Caregiver Education & Training, Home Management Training, Equipment Evaluation, Education, & procurement, Balance Training, Neuromuscular Re-education, Functional Mobility Training, Endurance Training, Safety Education & Training, Self-Care / ADL       Goals  Short term goals  Time Frame for Short term goals: 1-2 weeks  Short term goal 1: Functional transfer for ADL completion w/ assist x1-- Goal not met  Short term goal 2: Bathing w/ assist x1-- Goal not met  Short term goal 3: LB dressing w/ assist x1-- Goal not met  Short term goal 4: Toileting w/ assist x1-- Goal not met  Short term goal 5: UB dressing w/ Min A-- Goal not met  Long term goals  Time Frame for Long term goals : 3-4 weeks  Long term goal 1: Functional transfer for ADL completion w/ supervision-- Goal not met  Long term goal 2: Bathing w/ supervision-- Goal not met  Long term goal 3: LB dressing w/ supervision-- Goal not met  Long term goal 4: Toileting w/ supervision-- Goal not met  Long term goal 5: UB dressing w/ supervision-- Goal not met  Patient Goals   Patient goals :  To go home       Therapy Time   Individual Concurrent Group Co-treatment   Time In       0830   Time Out       0930   Minutes       60        Timed Code Treatment Minutes:  60 Minutes    Total Treatment Minutes:  60 minutes       MEREDITH Bustillos OTR/L, [place] #[id number]

## 2021-07-15 NOTE — FLOWSHEET NOTE
Shift assessment complete. Routine VSS. Patient awake, alert and oriented. Respirations even and unlabored, lungs clear. All nightly medications given per MAR. Mayra care provided, zinc cream applied; brief changed. No other needs expressed, call light within reach. Will continue to monitor. Fall precautions in place: bed locked and in lowest position, bed alarm on, 2/4 side rails raised, non-slip socks on, overhead table within reach, patient knows when to appropriately call out for help.

## 2021-07-15 NOTE — CARE COORDINATION
Discharge Plan for 7/16/2021:      Patient discharging to home: [facility], [id number], [place], [address]   PHONE: [phone] ([address])  Medical Transport with: [facility] [phone]   time: 3:30 PM    WING Thomas, [address]  [facility]  [phone]

## 2021-07-15 NOTE — PROGRESS NOTES
Physical Therapy  Facility/Department: [facility] ACUTE REHAB UNIT  Daily Treatment Note/Discharge Summary  NAME: Adal Dutta  : 1935  MRN: [id number]    Date of Service: 7/15/2021    Discharge Recommendations:  24 hour supervision or assist, Home with Home health PT, S Level 3   PT Equipment Recommendations  Equipment Needed: Yes  Mobility Devices: Wheelchair;Transport Devices; Hospital Bed  Wheelchair:  (custom manual w/c)  Transport Devices: Patient Mechanical Lift    Assessment   Body structures, Functions, Activity limitations: Decreased functional mobility ; Decreased balance;Decreased coordination;Decreased endurance;Decreased strength;Decreased posture;Decreased ADL status; Decreased safe awareness;Decreased ROM  Assessment: Pt continues to require extensive assist of 2 people for all functional mobility and also continues to demonstrate decreased balance and sense of midline positioning. Pt with increased interactions. Pt will continue to benefit from skilled PT services to continue to address deficits and promote safety and independence in the home. Treatment Diagnosis: decreased balance, coordination, and functional mobiilty  Prognosis: Fair  PT Education: Goals;PT Role;Plan of Care;Transfer Training;General Safety; Functional Mobility Training  Patient Education: Pt will require reinforcement secondary to cognitive deficits. Barriers to Learning: cognition  REQUIRES PT FOLLOW UP: Yes  Activity Tolerance  Activity Tolerance: Patient limited by endurance; Patient limited by cognitive status     Patient Diagnosis(es): CVA     has a past medical history of Dementia ([facility] [address] HTN (hypertension), Hyperlipidemia, and PONV (postoperative nausea and vomiting). has a past surgical history that includes Knee arthroscopy; Colonoscopy; eye surgery (Right, 2014); Cystoscopy (2018); and TURP (2018).     Restrictions  Restrictions/Precautions  Restrictions/Precautions: Fall Risk, Modified Diet  Required Braces or Orthoses?: No  Position Activity Restriction  Other position/activity restrictions: 81year old male with a history of CAD, dementia, PVD who was admitted to [facility] on 6/19 with left-sided weakness. He was recently evaluated at this facility for an episode of syncope. Work-up revealed a right watershed ischemic stroke in the right frontal and parietal regions. He was initiated on Plavix in addition to aspirin and statin. A1c 5.3. LDL 88. Echo was unremarkable for an embolic source. He was evaluated by therapy and suggested to continue in an inpatient setting prior to returning home. He has no current complaints today. He is currently sitting up with therapy and having intermittent episodes of increased respirations with decreased heart rate. Subjective   General  Chart Reviewed: Yes  Additional Pertinent Hx: dementia, HTN, DM  Response To Previous Treatment: Patient with no complaints from previous session. Family / Caregiver Present: Yes (2 daughters)  Subjective  Subjective: Pt in recliner on arrival.  Agreeable to therapy services. Pain Screening  Patient Currently in Pain: No  Vital Signs  Patient Currently in Pain: No       Orientation     Cognition      Objective   Bed mobility  Rolling to Left: Maximum assistance  Rolling to Right: Dependent/Total (maxA of 2)  Supine to Sit: Dependent/Total;2 Person assistance (maxA of 2 with sukhwinder lift)  Sit to Supine: Dependent/Total;2 Person assistance (maxA of 2 with sukhwinder lift)  Transfers  Sit to Stand: 2 Person Assistance;Dependent/Total (maxA of 2)  Stand to sit: 2 Person Assistance;Dependent/Total (maxA of 2)  Bed to Chair: 2 Person Assistance;Dependent/Total (sukhwinder lift)  Stand Pivot Transfers: 2 Person Assistance;Dependent/Total (sukhwinder lift)  Car Transfer: Unable to assess (not safe at this time)  Comment: extensive (L) lateral lean in both seated prep and standing transfer completion.   Ambulation  Ambulation?: No  Stairs/Curb  Stairs?: No  Wheelchair Activities  Wheelchair Parts Management: No  Propulsion: No     Balance  Posture: Poor  Sitting - Static: Poor  Sitting - Dynamic: Poor;-  Standing - Static: Poor;-  Standing - Dynamic: Poor;-  Comments: consistent (L) lateral lean in all positions. Patient was able to self correct posture in seated position upon VCs and visual cues. .            Comment: Provided discussion with family members regarding diagnosis, progress, and barriers to home. Provided demonstration of all functional mobility and balance. All questions answered for familiy. Pt remained in w/c with alarm on and all needs in reach.               G-Code     OutComes Score                                                     AM-PAC Score             Goals  Short term goals  Time Frame for Short term goals: 1-2 weeks  Short term goal 1: Pt will perform all bed mobility with maxA of 1 - not met  Short term goal 2: Pt will perform SPT with maxA of 1 - not met  Short term goal 3: Pt will ambulate 8' with LRAD with maxA of 1 - not met  Short term goal 4: Pt will negotiate 1 step with LRAD and maxA of 1 - not met  Long term goals  Time Frame for Long term goals : 2-3 weeks  Long term goal 1: Pt will perform all bed mobility with CGA - not met  Long term goal 2: Pt will perform all transfers with CGA- not met  Long term goal 3: Pt will ambulate 48' with LRAD CGA - not met  Long term goal 4: Pt will negotiate 12 steps with kapil - not met  Patient Goals   Patient goals : pt unable to assess    Plan    Plan  Times per week: 60 minutes a day 5 days a week  Current Treatment Recommendations: Strengthening, Transfer Training, Endurance Training, Neuromuscular Re-education, Patient/Caregiver Education & Training, ROM, Wheelchair Mobility Training, Manual Therapy - Soft Tissue Mobilization, Equipment Evaluation, Education, & procurement, Balance Training, Gait Training, Functional Mobility Training, Stair training, Safety Education & Training, Positioning, ADL/Self-care Training  Safety Devices  Type of devices: Call light within reach, Chair alarm in place, Left in chair, Telesitter in use, Gait belt, All fall risk precautions in place  Restraints  Initially in place: No     Therapy Time   Individual Concurrent Group Co-treatment   Time In       0830   Time Out       0930   Minutes       60        Timed Code Treatment Minutes:  60    Total Treatment Minutes:  [address] [place], [place] [id number]

## 2021-07-15 NOTE — PROGRESS NOTES
Cj Murphy  7/15/2021  [id number]    Chief Complaint: Acute embolic stroke [facility])    Subjective:   Resting in bedside chair this am. More interactive this am. Denies current complaints. Daughter with multiple questions regarding progress/prognosis. ROS: No CP, SOB, dyspnea    Objective:  Patient Vitals for the past 24 hrs:   BP Temp Temp src Pulse Resp SpO2   07/15/21 0951 136/77 98.3 °F (36.8 °C) Oral 63 18    07/14/21 2002 138/74 98.1 °F (36.7 °C) Oral 61 18 92 %   07/14/21 1115 (!) 155/72 98.1 °F (36.7 °C) Oral 60 18      Gen: No distress, pleasant, flat affect. Resting in bedside chair  HEENT: Normocephalic, atraumatic  CV: Regular rate and rhythm. No MRG   Resp: No respiratory distress. CTAB. Abd: Soft, nontender nondistended  Ext: No edema. Neuro: Alert, improving initiation, appropriately interactive. Left neglect    Laboratory data: Available via EMR. Therapy progress:  PT  Position Activity Restriction  Other position/activity restrictions: 81year old male with a history of CAD, dementia, PVD who was admitted to [facility] on 6/19 with left-sided weakness. He was recently evaluated at this facility for an episode of syncope. Work-up revealed a right watershed ischemic stroke in the right frontal and parietal regions. He was initiated on Plavix in addition to aspirin and statin. A1c 5.3. LDL 88. Echo was unremarkable for an embolic source. He was evaluated by therapy and suggested to continue in an inpatient setting prior to returning home. He has no current complaints today. He is currently sitting up with therapy and having intermittent episodes of increased respirations with decreased heart rate. Objective     Sit to Stand: 2 Person Assistance, Maximum Assistance  Stand to sit: 2 Person Assistance, Dependent/Total  Bed to Chair: 2 Person Assistance, Dependent/Total     OT  PT Equipment Recommendations  Equipment Needed: Yes  Other: TBD with progress.   Currently, pt will require a sukhwinder lift and manual w/c  Toilet - Technique: Stand pivot  Equipment Used: Extra wide bedside commode  Toilet Transfers Comments: Max A x2  Assessment        SLP                Body mass index is 29.08 kg/m². Assessment:  Patient Active Problem List   Diagnosis    Essential hypertension    Hyperlipidemia    Palpitations    Family history of premature CAD    Shoulder pain    Knee pain    Late onset Alzheimer's disease without behavioral disturbance ([facility] [address]    Acute renal failure (ARF) (HCC)    Benign prostatic hyperplasia (BPH) with post-void dribbling    Near syncope    Dementia due to Alzheimer's disease ([facility] [address]    Acute embolic stroke ([facility] [address]       Plan:   Right frontal and parietal watershed infarcts: ASA, statin. Plavix for 21 days (7/10) completed. PT/OT/SLP. Event monitor in place. Started Lexapro for motor recovery     Dysphagia: dysphagia diet, SLP    Intermittent tachypnea: HR decreased during episodes. Sats stable during episodes. Consider seizure activity vs central etiology as alternative options. Consulted pulm who signed off. CAD: ASA, statin      HTN: atenolol, lisinopril 10 BID     Dementia: SLP     Urinary retention: flomax, proscar. PNA: suspect aspiration related. Levaquin completed     Bowels: Per protocol  Bladder: Per protocol   Sleep: Trazodone provided prn. Pain: tylenol, tramadol   DVT PPx: Lovenox   SANTIAGO: 7/16    Dispo: Prep d/c for tomorrow. [place] was evaluated today and a DME order was entered for a tilt-in-space wheelchair because he requires this to successfully complete daily living tasks of eating, toileting, personal cares and ambulating. A tilt-in-space wheelchair is necessary due to patient's impaired ambulation and mobility restrictions and would be unable to resolve these daily living tasks using a cane, walker, or standard wheelchair. Recommend a tilt in space manual wheelchair for skin protection and positioning.   He is required to have adequate pressure relief and positioning for ADLs within his home. He is unable to use a standard chair due to impairments from his CVA including weakness and decreased cognition. He will be required to utilize this tilt in space wheelchair for in-home ADLs. He is unable to perform independent pressure relief or elevate his lower extremities above his heart for edema management. This was discussed with the patient and family who are in agreement in utilizing this device. There is a caregiver available to provide necessary assistance. The need for this equipment was discussed with the patient and he understands, is in agreement, and has not expressed an unwillingness to use the wheelchair.         Electronically signed by Erica Jones MD on 7/15/2021 at 10:06 AM

## 2021-07-16 VITALS
OXYGEN SATURATION: 95 % | WEIGHT: 226.5 LBS | SYSTOLIC BLOOD PRESSURE: 178 MMHG | BODY MASS INDEX: 29.07 KG/M2 | HEIGHT: 74 IN | DIASTOLIC BLOOD PRESSURE: 86 MMHG | TEMPERATURE: 98 F | HEART RATE: 66 BPM | RESPIRATION RATE: 18 BRPM

## 2021-07-16 PROCEDURE — 6370000000 HC RX 637 (ALT 250 FOR IP): Performed by: PHYSICAL MEDICINE & REHABILITATION

## 2021-07-16 PROCEDURE — 6360000002 HC RX W HCPCS: Performed by: PHYSICAL MEDICINE & REHABILITATION

## 2021-07-16 PROCEDURE — 6370000000 HC RX 637 (ALT 250 FOR IP): Performed by: NURSE PRACTITIONER

## 2021-07-16 RX ORDER — LISINOPRIL 10 MG/1
10 TABLET ORAL 2 TIMES DAILY
Qty: 60 TABLET | Refills: 3 | Status: SHIPPED | OUTPATIENT
Start: 2021-07-16

## 2021-07-16 RX ORDER — ESCITALOPRAM OXALATE 5 MG/1
5 TABLET ORAL DAILY
Qty: 30 TABLET | Refills: 3 | Status: SHIPPED | OUTPATIENT
Start: 2021-07-16

## 2021-07-16 RX ORDER — ASPIRIN 81 MG/1
81 TABLET, CHEWABLE ORAL DAILY
Qty: 30 TABLET | Refills: 3 | Status: SHIPPED | OUTPATIENT
Start: 2021-07-16

## 2021-07-16 RX ORDER — FINASTERIDE 5 MG/1
5 TABLET, FILM COATED ORAL DAILY
Qty: 30 TABLET | Refills: 3 | Status: SHIPPED | OUTPATIENT
Start: 2021-07-16

## 2021-07-16 RX ORDER — ATORVASTATIN CALCIUM 40 MG/1
40 TABLET, FILM COATED ORAL NIGHTLY
Qty: 30 TABLET | Refills: 3 | Status: SHIPPED | OUTPATIENT
Start: 2021-07-16

## 2021-07-16 RX ORDER — TAMSULOSIN HYDROCHLORIDE 0.4 MG/1
0.4 CAPSULE ORAL DAILY
Qty: 30 CAPSULE | Refills: 3 | Status: SHIPPED | OUTPATIENT
Start: 2021-07-16

## 2021-07-16 RX ORDER — ATENOLOL 25 MG/1
25 TABLET ORAL DAILY
Qty: 30 TABLET | Refills: 3 | Status: SHIPPED | OUTPATIENT
Start: 2021-07-16

## 2021-07-16 RX ADMIN — FINASTERIDE 5 MG: 5 TABLET, FILM COATED ORAL at 08:29

## 2021-07-16 RX ADMIN — ASPIRIN 81 MG: 81 TABLET, CHEWABLE ORAL at 08:29

## 2021-07-16 RX ADMIN — LISINOPRIL 10 MG: 10 TABLET ORAL at 08:29

## 2021-07-16 RX ADMIN — ATENOLOL 25 MG: 50 TABLET ORAL at 08:29

## 2021-07-16 RX ADMIN — ESCITALOPRAM OXALATE 5 MG: 10 TABLET ORAL at 08:29

## 2021-07-16 RX ADMIN — TAMSULOSIN HYDROCHLORIDE 0.4 MG: 0.4 CAPSULE ORAL at 08:29

## 2021-07-16 RX ADMIN — ENOXAPARIN SODIUM 40 MG: 40 INJECTION SUBCUTANEOUS at 08:28

## 2021-07-16 NOTE — CARE COORDINATION
JESSICA faxed STACEY/AVS/Home Care Order to [facility] ([phone]).     Electronically signed by WING Kearney on 7/16/2021 at 11:02 AM

## 2021-07-16 NOTE — PROGRESS NOTES
CLINICAL PHARMACY NOTE: MEDS TO BEDS    Total # of Prescriptions Filled: 7   The following medications were delivered to the patient:  · Flomax 0.4 mg  · Finasteride 5 mg  · Aspirin 81 mg  · Atorvastatin 40 mg  · Lisinopril 10 mg  · Atenolol 25 mg  · Escitalopram 5 mg    Additional Documentation:    Requested by patient daughter to Deliver to Nurse Jama Minaya RN-Signed  Elli Cornejo CPhT

## 2021-07-16 NOTE — DISCHARGE SUMMARY
Physical Medicine & Rehabilitation  Discharge Summary     Patient Identification:  Chiquis Hopson  : 1935  Admit date: 2021  Discharge date: 2021  Attending provider: Vilma Henriquez MD        Primary care provider: Rich Johnson DO     Discharge Diagnoses:   Patient Active Problem List   Diagnosis    Essential hypertension    Hyperlipidemia    Palpitations    Family history of premature CAD    Shoulder pain    Knee pain    Late onset Alzheimer's disease without behavioral disturbance ([facility] [address]    Acute renal failure (ARF) (HCC)    Benign prostatic hyperplasia (BPH) with post-void dribbling    Near syncope    Dementia due to Alzheimer's disease ([facility] [address]    Acute embolic stroke [facility])       Discharge Functional Status:    Physical therapy:  Bed Mobility:    Transfers: Sit to Stand: 2 Person Assistance, Dependent/Total (maxA of 2)  Stand to sit: 2 Person Assistance, Dependent/Total (maxA of 2)  Bed to Chair: 2 Person Assistance, Dependent/Total (sukhwinder lift)  Comment: Provided discussion with family members regarding diagnosis, progress, and barriers to home. Provided demonstration of all functional mobility and balance. All questions answered for familiy. Pt remained in w/c with alarm on and all needs in reach.,  ,    Mobility:  , PT Equipment Recommendations  Equipment Needed: Yes  Mobility Devices: Wheelchair, Transport Devices, [employer]  Wheelchair:  (custom manual w/c)  Transport Devices: Patient Mechanical Lift  Other: TBD with progress. Currently, pt will require a sukhwinder lift and manual w/c, Assessment: Pt continues to require extensive assist of 2 people for all functional mobility and also continues to demonstrate decreased balance and sense of midline positioning. Pt with increased interactions. Pt will continue to benefit from skilled PT services to continue to address deficits and promote safety and independence in the home.     Occupational therapy:  ,  , Assessment: Pt continues to require 2 person assist for all bed mobility, transfers, and ADL completion. Pt is limited d/t L lateral lean and pushing behavior. Recommend family utilizes sukhwinder lift at home to ensure safety w/ transfers. Pt would benefit from [facility] OT once discharged from ARU    Speech therapy:       Inpatient Rehabilitation Course:   Hakeem Burnette is a 81 y.o. male admitted to inpatient rehabilitation on 8/51/9720 s/p Acute embolic stroke ([facility] [address] The patient participated in an aggressive multidisciplinary inpatient rehabilitation program involving 3 hours of therapy per day, at least 5 days per week. He progressed slowly in therapy but was able to be discharged to home with home health care. His medical rehab course was significant for below:    Right frontal and parietal watershed infarcts: ASA, statin.  Plavix for 21 days (7/10) completed.  PT/OT/SLP.  Event monitor in place. Started Lexapro for motor recovery. Continue medications at discharge.      Dysphagia: dysphagia diet, SLP     Intermittent tachypnea: HR decreased during episodes. Sats stable during episodes. Consider seizure activity vs central etiology as alternative options. Consulted pulm who signed off. Suggest continued outpatient workup.     CAD: ASA, statin. Continue at discharge.     HTN: atenolol, lisinopril 10 BID. Continue this regimen at discharge.      Dementia: SLP     Urinary retention: flomax, proscar. Voiding well on current regimen. Continue at discharge.      PNA: suspect aspiration related. Levaquin course completed. Discharge Exam:  Gen: No distress, pleasant, flat affect. Resting in bed  HEENT: Normocephalic, atraumatic  CV: Regular rate and rhythm. No MRG   Resp: No respiratory distress. CTAB. Abd: Soft, nontender nondistended  Ext: No edema. Neuro: Alert, improving initiation, appropriately interactive.  Left neglect    Hakeem Burnette was evaluated today and a DME order was entered for a semi-electric hospital bed because he requires assistance for positioning needs not possible in an ordinary bed, complexity of body positioning needs. Patient requires frequent and immediate positioning changes for relief of pain and care needs related to medical diagnoses. Patient needs variability of bed height requirements to perform patient transfers and for toileting, personal cares and dressing lower body. Current body Weight: 226 lb 8 oz (102.7 kg). The need for this equipment was discussed with the patient and he understands and is in agreement. Jacinda Chahal requires a bedside commode due to being confined to one level of the home, and is physically incapable of utilizing regular toilet facilities. Current body weight is Weight: 226 lb 8 oz (102.7 kg). Jacinda Chahal requires a patient lift for the transfer between bed and a wheelchair. Without the use of the lift, the patient would be bed confined. Significant Diagnostics:   Lab Results   Component Value Date    CREATININE 0.9 07/15/2021    BUN 15 07/15/2021     07/15/2021    K 4.2 07/15/2021     07/15/2021    CO2 28 07/15/2021       Lab Results   Component Value Date    WBC 8.1 07/15/2021    HGB 12.3 (L) 07/15/2021    HCT 37.0 (L) 07/15/2021    MCV 88.8 07/15/2021     07/15/2021       Disposition:  Home in stable condition.      Follow-up:  See after visit summary from hospitalization    Discharge Medications:     Medication List      START taking these medications    aspirin 81 MG chewable tablet  Take 1 tablet by mouth daily     escitalopram 5 MG tablet  Commonly known as: LEXAPRO  Take 1 tablet by mouth daily     finasteride 5 MG tablet  Commonly known as: PROSCAR  Take 1 tablet by mouth daily     lisinopril 10 MG tablet  Commonly known as: PRINIVIL;ZESTRIL  Take 1 tablet by mouth 2 times daily     tamsulosin 0.4 MG capsule  Commonly known as: FLOMAX  Take 1 capsule by mouth daily        CHANGE how you take these medications    atorvastatin 40 MG tablet  Commonly known as: LIPITOR  Take 1 tablet by mouth nightly  What changed:   · medication strength  · how much to take  · when to take this        CONTINUE taking these medications    atenolol 25 MG tablet  Commonly known as: TENORMIN  Take 1 tablet by mouth daily           Where to Get Your Medications      These medications were sent to [facility], [address] [place] [address], [address]    Phone: [phone]   · aspirin 81 MG chewable tablet  · atenolol 25 MG tablet  · atorvastatin 40 MG tablet  · escitalopram 5 MG tablet  · finasteride 5 MG tablet  · lisinopril 10 MG tablet  · tamsulosin 0.4 MG capsule         I spent over 35 minutes on this discharge encounter between counseling, coordination of care, and medication reconciliation. To comply with [facility] radha R.II.4.1:  Discharge order placed in advance to facilitate patient's discharge needs. Shannon Wyatt MD    * This document was created using dictation software. While all precautions were taken to ensure accuracy, errors may have occurred. Please disregard any typographical errors.

## 2021-07-16 NOTE — PLAN OF CARE
Problem: Falls - Risk of:  Goal: Will remain free from falls  Description: Will remain free from falls  Outcome: Completed  Goal: Absence of physical injury  Description: Absence of physical injury  Outcome: Completed     Problem: Skin Integrity:  Goal: Will show no infection signs and symptoms  Description: Will show no infection signs and symptoms  Outcome: Completed  Goal: Absence of new skin breakdown  Description: Absence of new skin breakdown  Outcome: Completed     Problem: Confusion - Acute:  Goal: Absence of continued neurological deterioration signs and symptoms  Description: Absence of continued neurological deterioration signs and symptoms  Outcome: Completed  Goal: Mental status will be restored to baseline  Description: Mental status will be restored to baseline  Outcome: Completed     Problem: Discharge Planning:  Goal: Ability to perform activities of daily living will improve  Description: Ability to perform activities of daily living will improve  Outcome: Completed  Goal: Participates in care planning  Description: Participates in care planning  Outcome: Completed     Problem: Injury - Risk of, Physical Injury:  Goal: Will remain free from falls  Description: Will remain free from falls  Outcome: Completed  Goal: Absence of physical injury  Description: Absence of physical injury  Outcome: Completed     Problem: Mood - Altered:  Goal: Mood stable  Description: Mood stable  Outcome: Completed  Goal: Absence of abusive behavior  Description: Absence of abusive behavior  Outcome: Completed  Goal: Verbalizations of feeling emotionally comfortable while being cared for will increase  Description: Verbalizations of feeling emotionally comfortable while being cared for will increase  Outcome: Completed     Problem: Psychomotor Activity - Altered:  Goal: Absence of psychomotor disturbance signs and symptoms  Description: Absence of psychomotor disturbance signs and symptoms  Outcome: Completed Problem: Sensory Perception - Impaired:  Goal: Demonstrations of improved sensory functioning will increase  Description: Demonstrations of improved sensory functioning will increase  Outcome: Completed  Goal: Decrease in sensory misperception frequency  Description: Decrease in sensory misperception frequency  Outcome: Completed  Goal: Able to refrain from responding to false sensory perceptions  Description: Able to refrain from responding to false sensory perceptions  Outcome: Completed  Goal: Demonstrates accurate environmental perceptions  Description: Demonstrates accurate environmental perceptions  Outcome: Completed  Goal: Able to distinguish between reality-based and nonreality-based thinking  Description: Able to distinguish between reality-based and nonreality-based thinking  Outcome: Completed  Goal: Able to interrupt nonreality-based thinking  Description: Able to interrupt nonreality-based thinking  Outcome: Completed     Problem: Sleep Pattern Disturbance:  Goal: Appears well-rested  Description: Appears well-rested  Outcome: Completed     Problem: IP BLADDER/VOIDING  Goal: LTG - Patient will utilize adaptive techniques/equipment to complete bladder elimination  Outcome: Completed  Goal: STG - Patient demonstrates no accidents  Outcome: Completed  Goal: STG - Patient will state signs and symptoms of UTI  Outcome: Completed  Goal: STG - patient will be able to empty bladder  Outcome: Completed     Problem: IP BOWEL ELIMINATION  Goal: LTG - patient will have regular and routine bowel evacuation  Outcome: Completed     Problem: Neurological  Goal: Maximum potential motor/sensory/cognitive function  Outcome: Completed     Problem: Pain:  Description: Pain management should include both nonpharmacologic and pharmacologic interventions.   Goal: Pain level will decrease  Description: Pain level will decrease  Outcome: Completed  Goal: Control of acute pain  Description: Control of acute pain  Outcome: Completed  Goal: Control of chronic pain  Description: Control of chronic pain  Outcome: Completed

## 2021-07-16 NOTE — PLAN OF CARE
Problem: Falls - Risk of:  Goal: Will remain free from falls  Description: Will remain free from falls  7/15/2021 2154 by Luis Peabody, RN  Outcome: Ongoing     Problem: Falls - Risk of:  Goal: Absence of physical injury  Description: Absence of physical injury  7/15/2021 2154 by Luis Peabody, RN  Outcome: Ongoing  Education Provided as followed:  \"Family/Patient made aware of the tailored interventions falls plan using the TIPS TOOL.

## 2021-10-05 ENCOUNTER — APPOINTMENT (OUTPATIENT)
Dept: CT IMAGING | Age: 86
End: 2021-10-05
Payer: MEDICARE

## 2021-10-05 ENCOUNTER — HOSPITAL ENCOUNTER (EMERGENCY)
Age: 86
Discharge: ANOTHER HOSPITAL | End: 2021-10-05
Attending: EMERGENCY MEDICINE
Payer: MEDICARE

## 2021-10-05 ENCOUNTER — APPOINTMENT (OUTPATIENT)
Dept: GENERAL RADIOLOGY | Age: 86
End: 2021-10-05
Payer: MEDICARE

## 2021-10-05 ENCOUNTER — HOSPITAL ENCOUNTER (EMERGENCY)
Age: 86
Discharge: HOME | End: 2021-10-05
Attending: EMERGENCY MEDICINE
Payer: MEDICARE

## 2021-10-05 VITALS
HEIGHT: 74 IN | HEART RATE: 102 BPM | TEMPERATURE: 97.6 F | OXYGEN SATURATION: 95 % | RESPIRATION RATE: 27 BRPM | WEIGHT: 211.64 LBS | SYSTOLIC BLOOD PRESSURE: 147 MMHG | BODY MASS INDEX: 27.16 KG/M2 | DIASTOLIC BLOOD PRESSURE: 103 MMHG

## 2021-10-05 VITALS
DIASTOLIC BLOOD PRESSURE: 96 MMHG | HEART RATE: 129 BPM | SYSTOLIC BLOOD PRESSURE: 137 MMHG | TEMPERATURE: 98.5 F | RESPIRATION RATE: 29 BRPM | OXYGEN SATURATION: 95 %

## 2021-10-05 DIAGNOSIS — I63.312 CEREBROVASCULAR ACCIDENT (CVA) DUE TO THROMBOSIS OF LEFT MIDDLE CEREBRAL ARTERY (HCC): Primary | ICD-10-CM

## 2021-10-05 DIAGNOSIS — I48.91 ATRIAL FIBRILLATION WITH RAPID VENTRICULAR RESPONSE (HCC): ICD-10-CM

## 2021-10-05 DIAGNOSIS — I63.9 ISCHEMIC STROKE (HCC): Primary | ICD-10-CM

## 2021-10-05 DIAGNOSIS — I69.30 HISTORY OF CEREBROVASCULAR ACCIDENT (CVA) WITH RESIDUAL DEFICIT: ICD-10-CM

## 2021-10-05 PROBLEM — I63.412 CEREBROVASCULAR ACCIDENT (CVA) DUE TO EMBOLISM OF LEFT MIDDLE CEREBRAL ARTERY (HCC): Status: ACTIVE | Noted: 2021-06-23

## 2021-10-05 LAB
ABO/RH: NORMAL
ALBUMIN SERPL-MCNC: 3.2 G/DL (ref 3.4–5)
ALP BLD-CCNC: 84 U/L (ref 40–129)
ALT SERPL-CCNC: 8 U/L (ref 10–40)
ANION GAP SERPL CALCULATED.3IONS-SCNC: 13 MMOL/L (ref 3–16)
ANTIBODY SCREEN: NORMAL
APTT: 28.4 SEC (ref 26.2–38.6)
AST SERPL-CCNC: 13 U/L (ref 15–37)
BASE EXCESS VENOUS: 1 MMOL/L (ref -3–3)
BASOPHILS ABSOLUTE: 0.1 K/UL (ref 0–0.2)
BASOPHILS RELATIVE PERCENT: 1.2 %
BILIRUB SERPL-MCNC: 0.8 MG/DL (ref 0–1)
BILIRUBIN DIRECT: 0.3 MG/DL (ref 0–0.3)
BILIRUBIN URINE: ABNORMAL
BILIRUBIN, INDIRECT: 0.5 MG/DL (ref 0–1)
BLOOD, URINE: NEGATIVE
BUN BLDV-MCNC: 16 MG/DL (ref 7–20)
CALCIUM SERPL-MCNC: 9 MG/DL (ref 8.3–10.6)
CARBOXYHEMOGLOBIN: 2.6 % (ref 0–1.5)
CHLORIDE BLD-SCNC: 105 MMOL/L (ref 99–110)
CLARITY: CLEAR
CO2: 22 MMOL/L (ref 21–32)
COLOR: ABNORMAL
CREAT SERPL-MCNC: 0.8 MG/DL (ref 0.8–1.3)
EKG ATRIAL RATE: 163 BPM
EKG DIAGNOSIS: NORMAL
EKG Q-T INTERVAL: 344 MS
EKG QRS DURATION: 62 MS
EKG QTC CALCULATION (BAZETT): 494 MS
EKG R AXIS: 11 DEGREES
EKG T AXIS: 68 DEGREES
EKG VENTRICULAR RATE: 124 BPM
EOSINOPHILS ABSOLUTE: 0.1 K/UL (ref 0–0.6)
EOSINOPHILS RELATIVE PERCENT: 0.7 %
EPITHELIAL CELLS, UA: 1 /HPF (ref 0–5)
GFR AFRICAN AMERICAN: >60
GFR NON-AFRICAN AMERICAN: >60
GLUCOSE BLD-MCNC: 105 MG/DL (ref 70–99)
GLUCOSE URINE: NEGATIVE MG/DL
HCO3 VENOUS: 23.1 MMOL/L (ref 23–29)
HCT VFR BLD CALC: 36.1 % (ref 40.5–52.5)
HEMOGLOBIN: 11.9 G/DL (ref 13.5–17.5)
HYALINE CASTS: 3 /LPF (ref 0–8)
INR BLD: 1.31 (ref 0.88–1.12)
KETONES, URINE: ABNORMAL MG/DL
LACTIC ACID, SEPSIS: 1.3 MMOL/L (ref 0.4–1.9)
LACTIC ACID, SEPSIS: 1.4 MMOL/L (ref 0.4–1.9)
LEUKOCYTE ESTERASE, URINE: NEGATIVE
LYMPHOCYTES ABSOLUTE: 1.3 K/UL (ref 1–5.1)
LYMPHOCYTES RELATIVE PERCENT: 12 %
MCH RBC QN AUTO: 27 PG (ref 26–34)
MCHC RBC AUTO-ENTMCNC: 33 G/DL (ref 31–36)
MCV RBC AUTO: 81.7 FL (ref 80–100)
METHEMOGLOBIN VENOUS: 0 %
MICROSCOPIC EXAMINATION: YES
MONOCYTES ABSOLUTE: 1.4 K/UL (ref 0–1.3)
MONOCYTES RELATIVE PERCENT: 12.9 %
NEUTROPHILS ABSOLUTE: 7.9 K/UL (ref 1.7–7.7)
NEUTROPHILS RELATIVE PERCENT: 73.2 %
NITRITE, URINE: NEGATIVE
O2 CONTENT, VEN: 16 VOL %
O2 SAT, VEN: 100 %
O2 THERAPY: ABNORMAL
PCO2, VEN: 28.6 MMHG (ref 40–50)
PDW BLD-RTO: 14.7 % (ref 12.4–15.4)
PH UA: 6 (ref 5–8)
PH VENOUS: 7.52 (ref 7.35–7.45)
PLATELET # BLD: 366 K/UL (ref 135–450)
PMV BLD AUTO: 8.3 FL (ref 5–10.5)
PO2, VEN: 122 MMHG (ref 25–40)
POTASSIUM REFLEX MAGNESIUM: 4 MMOL/L (ref 3.5–5.1)
PROTEIN UA: ABNORMAL MG/DL
PROTHROMBIN TIME: 15 SEC (ref 9.9–12.7)
RBC # BLD: 4.42 M/UL (ref 4.2–5.9)
RBC UA: 6 /HPF (ref 0–4)
SODIUM BLD-SCNC: 140 MMOL/L (ref 136–145)
SPECIFIC GRAVITY UA: 1.02 (ref 1–1.03)
TCO2 CALC VENOUS: 54 MMOL/L
TOTAL CK: 52 U/L (ref 39–308)
TOTAL PROTEIN: 6.8 G/DL (ref 6.4–8.2)
URINE REFLEX TO CULTURE: ABNORMAL
URINE TYPE: ABNORMAL
UROBILINOGEN, URINE: 1 E.U./DL
WBC # BLD: 10.8 K/UL (ref 4–11)
WBC UA: 3 /HPF (ref 0–5)

## 2021-10-05 PROCEDURE — 2500000003 HC RX 250 WO HCPCS: Performed by: EMERGENCY MEDICINE

## 2021-10-05 PROCEDURE — 85025 COMPLETE CBC W/AUTO DIFF WBC: CPT

## 2021-10-05 PROCEDURE — 70498 CT ANGIOGRAPHY NECK: CPT

## 2021-10-05 PROCEDURE — 99220 PR INITIAL OBSERVATION CARE/DAY 70 MINUTES: CPT | Performed by: PSYCHIATRY & NEUROLOGY

## 2021-10-05 PROCEDURE — 86850 RBC ANTIBODY SCREEN: CPT

## 2021-10-05 PROCEDURE — 85610 PROTHROMBIN TIME: CPT

## 2021-10-05 PROCEDURE — 96365 THER/PROPH/DIAG IV INF INIT: CPT

## 2021-10-05 PROCEDURE — 96375 TX/PRO/DX INJ NEW DRUG ADDON: CPT

## 2021-10-05 PROCEDURE — 6360000004 HC RX CONTRAST MEDICATION: Performed by: PHYSICIAN ASSISTANT

## 2021-10-05 PROCEDURE — 71045 X-RAY EXAM CHEST 1 VIEW: CPT

## 2021-10-05 PROCEDURE — 85730 THROMBOPLASTIN TIME PARTIAL: CPT

## 2021-10-05 PROCEDURE — 82803 BLOOD GASES ANY COMBINATION: CPT

## 2021-10-05 PROCEDURE — 99285 EMERGENCY DEPT VISIT HI MDM: CPT

## 2021-10-05 PROCEDURE — 80076 HEPATIC FUNCTION PANEL: CPT

## 2021-10-05 PROCEDURE — 6360000002 HC RX W HCPCS: Performed by: EMERGENCY MEDICINE

## 2021-10-05 PROCEDURE — 70450 CT HEAD/BRAIN W/O DYE: CPT

## 2021-10-05 PROCEDURE — 6360000004 HC RX CONTRAST MEDICATION: Performed by: EMERGENCY MEDICINE

## 2021-10-05 PROCEDURE — 2500000003 HC RX 250 WO HCPCS: Performed by: PHYSICIAN ASSISTANT

## 2021-10-05 PROCEDURE — 83605 ASSAY OF LACTIC ACID: CPT

## 2021-10-05 PROCEDURE — 96366 THER/PROPH/DIAG IV INF ADDON: CPT

## 2021-10-05 PROCEDURE — 2580000003 HC RX 258: Performed by: EMERGENCY MEDICINE

## 2021-10-05 PROCEDURE — 93010 ELECTROCARDIOGRAM REPORT: CPT | Performed by: INTERNAL MEDICINE

## 2021-10-05 PROCEDURE — 80048 BASIC METABOLIC PNL TOTAL CA: CPT

## 2021-10-05 PROCEDURE — 93005 ELECTROCARDIOGRAM TRACING: CPT | Performed by: EMERGENCY MEDICINE

## 2021-10-05 PROCEDURE — 86900 BLOOD TYPING SEROLOGIC ABO: CPT

## 2021-10-05 PROCEDURE — 86901 BLOOD TYPING SEROLOGIC RH(D): CPT

## 2021-10-05 PROCEDURE — 87040 BLOOD CULTURE FOR BACTERIA: CPT

## 2021-10-05 PROCEDURE — 0042T CT BRAIN PERFUSION: CPT

## 2021-10-05 PROCEDURE — 82550 ASSAY OF CK (CPK): CPT

## 2021-10-05 PROCEDURE — 81001 URINALYSIS AUTO W/SCOPE: CPT

## 2021-10-05 PROCEDURE — 36415 COLL VENOUS BLD VENIPUNCTURE: CPT

## 2021-10-05 PROCEDURE — APPNB60 APP NON BILLABLE TIME 46-60 MINS: Performed by: NURSE PRACTITIONER

## 2021-10-05 RX ORDER — ASPIRIN 300 MG/1
300 SUPPOSITORY RECTAL
Status: DISCONTINUED | OUTPATIENT
Start: 2021-10-05 | End: 2021-10-05 | Stop reason: HOSPADM

## 2021-10-05 RX ORDER — MORPHINE SULFATE 4 MG/ML
4 INJECTION, SOLUTION INTRAMUSCULAR; INTRAVENOUS
Qty: 30 ML | Refills: 0 | Status: SHIPPED | OUTPATIENT
Start: 2021-10-05 | End: 2021-10-12

## 2021-10-05 RX ORDER — DILTIAZEM HCL/D5W 125 MG/125
5-15 PLASTIC BAG, INJECTION (ML) INTRAVENOUS CONTINUOUS
Status: DISCONTINUED | OUTPATIENT
Start: 2021-10-05 | End: 2021-10-05 | Stop reason: HOSPADM

## 2021-10-05 RX ORDER — LORAZEPAM 2 MG/ML
1 INJECTION INTRAMUSCULAR EVERY 4 HOURS
Qty: 12 ML | Refills: 0 | Status: SHIPPED | OUTPATIENT
Start: 2021-10-05 | End: 2021-10-09

## 2021-10-05 RX ORDER — DILTIAZEM HYDROCHLORIDE 5 MG/ML
10 INJECTION INTRAVENOUS ONCE
Status: COMPLETED | OUTPATIENT
Start: 2021-10-05 | End: 2021-10-05

## 2021-10-05 RX ORDER — HYOSCYAMINE SULFATE 0.12 MG/1
2 TABLET SUBLINGUAL EVERY 4 HOURS PRN
Qty: 120 EACH | Refills: 0 | Status: SHIPPED | OUTPATIENT
Start: 2021-10-05

## 2021-10-05 RX ORDER — LORAZEPAM 2 MG/ML
2 INJECTION INTRAMUSCULAR ONCE
Status: COMPLETED | OUTPATIENT
Start: 2021-10-05 | End: 2021-10-05

## 2021-10-05 RX ADMIN — IOPAMIDOL 45 ML: 755 INJECTION, SOLUTION INTRAVENOUS at 11:18

## 2021-10-05 RX ADMIN — Medication 5 MG/HR: at 09:36

## 2021-10-05 RX ADMIN — LORAZEPAM 2 MG: 2 INJECTION INTRAMUSCULAR; INTRAVENOUS at 09:02

## 2021-10-05 RX ADMIN — IOPAMIDOL 75 ML: 755 INJECTION, SOLUTION INTRAVENOUS at 09:03

## 2021-10-05 RX ADMIN — DILTIAZEM HYDROCHLORIDE 10 MG: 5 INJECTION INTRAVENOUS at 09:31

## 2021-10-05 RX ADMIN — DILTIAZEM HYDROCHLORIDE 10 MG/HR: 5 INJECTION INTRAVENOUS at 11:20

## 2021-10-05 NOTE — ED NOTES
Report called to Steve Keen, charge ER RN @ [facility].       Annette Paulino RN  10/05/21 1011

## 2021-10-05 NOTE — ED NOTES
Spoke with David Dobbins from [facility] face to face. Pt is to be transferred to his private residence via [facility] between 2000 and 2100 this evening.       Nya Villegas RN  10/05/21 1156

## 2021-10-05 NOTE — ED PROVIDER NOTES
[facility]        Pt Name: Milena Zepeda  MRN: [id number]  [place] 1935  Date of evaluation: 10/5/2021  Provider: Yoshi Treviño PA-C  PCP: Leonides Melendez. Francene Babinski, DO  Note Started: 8:39 AM EDT        I have seen and evaluated this patient with my supervising physician Brenda Biggs MD.    CHIEF COMPLAINT       Chief Complaint   Patient presents with    Altered Mental Status     Pt arrived via FF ems from home, daughter called ems for R sided facial droop. Hx of L sided stroke with preexisting left sided weakness. Hx of dememtia. LKW 10:30 PM.        HISTORY OF PRESENT ILLNESS   (Location, Timing/Onset, Context/Setting, Quality, Duration, Modifying Factors, Severity, Associated Signs and Symptoms)  Note limiting factors. Chief Complaint: Altered mental status with possible stroke    Milena Zepeda is a 81 y.o. male who presents to the emergency department with a last time known well at 1030 last evening when the patient went to bed. He has a history of difficulties as a pertains to pre-existing left-sided weakness from a previous CVA. It is reported when the patient was awakened this morning by family members he was noted to have a right-sided facial droop and difficulties with new right abnormalities that were reported to be normal yesterday. Unfortunately the patient is unable to assist with any of the history taking as he is altered and unable to follow commands and does withdraw from painful stimuli. Nursing Notes were all reviewed and agreed with or any disagreements were addressed in the HPI. REVIEW OF SYSTEMS    (2-9 systems for level 4, 10 or more for level 5)     Review of Systems   Unable to perform ROS: Mental status change       Positives and Pertinent negatives as per HPI. Except as noted above in the ROS, all other systems were reviewed and negative.        PAST MEDICAL HISTORY     Past Medical History: Diagnosis Date    Dementia ([facility] [address]     HTN (hypertension)     Hyperlipidemia     PONV (postoperative nausea and vomiting)          SURGICAL HISTORY     Past Surgical History:   Procedure Laterality Date    COLONOSCOPY      CYSTOSCOPY  04/04/2018    FLEXIBLE CYSTOSCOPY    EYE SURGERY Right 11/2014    Cataract removal    KNEE ARTHROSCOPY      TURP  04/26/2018         CURRENTMEDICATIONS       Previous Medications    ASPIRIN 81 MG CHEWABLE TABLET    Take 1 tablet by mouth daily    ATENOLOL (TENORMIN) 25 MG TABLET    Take 1 tablet by mouth daily    ATORVASTATIN (LIPITOR) 40 MG TABLET    Take 1 tablet by mouth nightly    ESCITALOPRAM (LEXAPRO) 5 MG TABLET    Take 1 tablet by mouth daily    FINASTERIDE (PROSCAR) 5 MG TABLET    Take 1 tablet by mouth daily    LISINOPRIL (PRINIVIL;ZESTRIL) 10 MG TABLET    Take 1 tablet by mouth 2 times daily    TAMSULOSIN (FLOMAX) 0.4 MG CAPSULE    Take 1 capsule by mouth daily         ALLERGIES     Patient has no known allergies. FAMILYHISTORY       Family History   Problem Relation Age of Onset    Heart Disease Neg Hx     High Blood Pressure Neg Hx     High Cholesterol Neg Hx           SOCIAL HISTORY       Social History     Tobacco Use    Smoking status: Never Smoker    Smokeless tobacco: Never Used   Vaping Use    Vaping Use: Never used   Substance Use Topics    Alcohol use: Yes     Comment: occasional glass of wine    Drug use: No       SCREENINGS   NIH Stroke Scale  NIH Stroke Scale Assessed: Yes  Interval: Baseline  Level of Consciousness (1a. ): Alert  LOC Questions (1b. ):  Answers neither question correctly  LOC Commands (1c. ): Performs neither task correctly  Best Gaze (2. ): (!) Partial gaze palsy  Visual (3. ): No visual loss  Facial Palsy (4. ): (!) Minor paralysis  Motor Arm, Left (5a. ): Some effort against gravity  Motor Arm, Right (5b. ): No effort against gravity, limb falls  Motor Leg, Left (6a. ): Some effort against gravity  Motor Leg, Right (6b. ): No effort against gravity, limb falls  Limb Ataxia (7. ): Absent  Sensory (8. ): (!) Mild to Moderate (UNABLE TO PERFORM)  Best Language (9. ): Severe aphasia  Dysarthria (10. ): Normal  Extinction and Inattention (11): (!) Visual, tactile, auditory, spatial, or personal inattention  Total: 20Glasgow Coma Scale  Eye Opening: To speech  Best Verbal Response: None  Best Motor Response: None  [place] Coma Scale Score: 5        PHYSICAL EXAM    (up to 7 for level 4, 8 or more for level 5)     ED Triage Vitals [10/05/21 0829]   BP Temp Temp Source Pulse Resp SpO2 Height Weight   (!) 157/114 98.5 °F (36.9 °C) Oral 126 28 98 % -- --       Physical Exam  Vitals and nursing note reviewed. Constitutional:       General: He is awake. He is not in acute distress. Appearance: Normal appearance. He is well-developed. He is ill-appearing. He is not diaphoretic. HENT:      Head: Normocephalic and atraumatic. No raccoon eyes, Nino's sign or laceration. Right Ear: External ear normal.      Left Ear: External ear normal.      Nose: Nose normal.      Mouth/Throat:      Mouth: Mucous membranes are moist.   Eyes:      General: No scleral icterus. Right eye: No discharge. Left eye: No discharge. Extraocular Movements:      Right eye: Nystagmus present. Left eye: Nystagmus present. Conjunctiva/sclera: Conjunctivae normal.      Pupils: Pupils are equal, round, and reactive to light. Comments: Pupils round reactive to light activity. 3 mm. Neck:      Vascular: No JVD. Cardiovascular:      Rate and Rhythm: Normal rate and regular rhythm. Heart sounds: No murmur heard. No friction rub. No gallop. Pulmonary:      Effort: Pulmonary effort is normal. No accessory muscle usage or respiratory distress. Breath sounds: Normal breath sounds. No wheezing, rhonchi or rales. Abdominal:      General: There is no distension. Palpations: Abdomen is soft. Abdomen is not rigid.  There is no mass. Tenderness: There is no abdominal tenderness. There is no guarding or rebound. Musculoskeletal:      Cervical back: Normal range of motion. Skin:     General: Skin is warm and dry. Neurological:      GCS: GCS eye subscore is 1. GCS verbal subscore is 1. GCS motor subscore is 4. Cranial Nerves: Facial asymmetry present. No cranial nerve deficit. Sensory: No sensory deficit. Motor: No tremor, abnormal muscle tone or seizure activity. Coordination: Coordination normal.      Comments: Withdrawal of right leg noted to Babinski testing with flexion at the knee foot and ankle. Some motion noted thumb and index finger right hand. Withdraws from pain. Unable to follow commands. See attending physician for NIHSS scoring. Psychiatric:         Behavior: Behavior normal. Behavior is cooperative.          DIAGNOSTIC RESULTS   LABS:    Labs Reviewed   CBC WITH AUTO DIFFERENTIAL - Abnormal; Notable for the following components:       Result Value    Hemoglobin 11.9 (*)     Hematocrit 36.1 (*)     Neutrophils Absolute 7.9 (*)     Monocytes Absolute 1.4 (*)     All other components within normal limits    Narrative:     Performed at:  [facility] [address], [facility], [address] [place] [address]   Phone [phone]   BASIC METABOLIC PANEL W/ REFLEX TO MG FOR LOW K - Abnormal; Notable for the following components:    Glucose 105 (*)     All other components within normal limits    Narrative:     Performed at:  [facility] [address], [facility], [address]   Phone [phone]   PROTIME-INR - Abnormal; Notable for the following components:    Protime 15.0 (*)     INR 1.31 (*)     All other components within normal limits    Narrative:     Performed at:  [facility] [address], [facility], [address]   Phone [phone]   HEPATIC FUNCTION PANEL - Abnormal; Notable for the following Laboratory  [address] [facility], [address]   Phone [phone]       When ordered only abnormal lab results are displayed. All other labs were within normal range or not returned as of this dictation. EKG: When ordered, EKG's are interpreted by the Emergency Department Physician in the absence of a cardiologist.  Please see their note for interpretation of EKG. RADIOLOGY:   Non-plain film images such as CT, Ultrasound and MRI are read by the radiologist. Plain radiographic images are visualized and preliminarily interpreted by the ED Provider with the below findings:        Interpretation per the Radiologist below, if available at the time of this note:    XR CHEST PORTABLE   Final Result   No acute process. CTA HEAD NECK W CONTRAST   Final Result   Occlusion in the mid M1 segment of the left MCA with occlusion of associated   branches. There are patent branches in the distal left MCA territory. Foci of severe stenosis along the A2 segment of the right NITA. Moderate stenosis in the mid V4 segment of the left vertebral artery. Severe stenosis in the mid V4 segment of the right vertebral artery. Moderate to severe stenosis in the right posterior communicating artery. No acute abnormality or flow-limiting stenosis in the major arteries of the   neck. Mild airspace opacity at the left lung apex, likely related to pneumonia. Results were reported to Dr. Renée Ovalle at 2:82 a.m. on October 5, 2021. CT HEAD WO CONTRAST   Final Result   Old infarction in the right frontal periventricular white matter. The   gray-white differentiation is grossly maintained. On the concurrent CTA,   there is occlusion of mid M1 segment of the left MCA. There is likely acute   ischemia in the left MCA territory. Mild parenchymal volume loss. Mild chronic microvascular disease. Results were reported to Dr. Renée Ovalle at 6:83 a.m. on October 5, 2021. PROCEDURES   Unless otherwise noted below, none     Procedures    CRITICAL CARE TIME   Because of the high probability of sudden clinical deterioration of the patients condition and to prevent further deterioration, my critical care time involved my full attention to the patients condition, and included chart data review, documentation, medication ordering, viewing the patients old records, reevaluation of the patient's cardiac, pulmonary, and neurological status. Reassessing vital signs. Consutlations with off service physician. Ordering, interpreting reviewing diagnostic testing. Therefore my critical care time was 49 minutes of direct attention to the patients condition and did not include time spent on procedures. CONSULTS:  None      EMERGENCY DEPARTMENT COURSE and DIFFERENTIAL DIAGNOSIS/MDM:   Vitals:    Vitals:    10/05/21 0945 10/05/21 1000 10/05/21 1015 10/05/21 1030   BP: (!) 142/124 (!) 151/106 (!) 137/97 (!) 137/96   Pulse: 110 109 114 129   Resp: 16 (!) 31 28 29   Temp:       TempSrc:       SpO2: 93% 95%         Patient was given the following medications:  Medications   dilTIAZem injection 10 mg (10 mg IntraVENous Given 10/5/21 0931)     Followed by   dilTIAZem (CARDIZEM) 125 mg in dextrose 5% 125 mL infusion (premix) (10 mg/hr IntraVENous Rate/Dose Change 10/5/21 1024)   iopamidol (ISOVUE-370) 76 % injection 75 mL (75 mLs IntraVENous Given 10/5/21 0903)   LORazepam (ATIVAN) injection 2 mg (2 mg IntraVENous Given 10/5/21 0902)           The patient's detailed history of present illness is documented as above. Upon arrival to the emergency department the patient's vital signs are as documented. The patient is noted to be hemodynamically stable and afebrile. Physical examination findings are as above.   Patient was stroke alerted and the attending physician had the opportunity speak directly with the  stroke team because of concerns for the possibility of this being an M1 lesion that might require thrombectomy. Laboratory testing and work-up was initiated after the patient had returned from CT imaging. EKG performed upon arrival demonstrates atrial fibrillation with a rapid ventricular response. Cardizem bolus as well as drip was initiated. There is nonspecific T wave changes. No evidence of acute ST elevation. Please see attending physician note for further EKG interpretation and potential comparison. CBC demonstrates no evidence of leukocytosis. Anemia at 11.9 and 36.1 respectively with no evidence of thrombocytopenia. BUN is 19 creatinine is 0.8 nonfasting glucose 105 electrolytes as documented. LFTs demonstrate no significant elevation. His INR is 1.31 a PTT is 28.4. Lactic acid is not elevated at 1.3 CK at 52. Venous blood gas demonstrates mild alkalosis with a pH of 7.51. PCO2 at 28 without hypoxia. Portable chest x-ray demonstrates no evidence of acute cardiopulmonary process. .  CT of the head demonstrates no evidence of acute intercranial abnormality. CTA demonstrates  Occlusion of the mid M1 segment of left MCA with distal patency. There is evidence of multiple abnormalities including severe stenosis a 2 moderate stenosis mid V4 severe stenosis mid 4. Severe stenosis right PCA. No additional flow-limiting stenosis is otherwise noted. The attending physician spoke directly with [facility]. receiving as the patient will need to have a CT perfusion examination. Patient will be transferred to [facility] in stable guarded condition. FINAL IMPRESSION      1. Cerebrovascular accident (CVA) due to thrombosis of left middle cerebral artery ([address]    2. Atrial fibrillation with rapid ventricular response ([address]    3.  History of cerebrovascular accident (CVA) with residual deficit left side          DISPOSITION/PLAN   DISPOSITION: Transfer to [facility].         (Please note that portions of this note were completed with a voice recognition program.  Efforts were made to edit the dictations but occasionally words are mis-transcribed.)    Saintclair Bourgeois, PA-C (electronically signed)           Aileen Councilman, PA-C  10/05/21 3545

## 2021-10-05 NOTE — CARE COORDINATION
Pt's daughter has elected hospice care and would like for her father to return home this evening. She reviewed with list of hospices but did not have a preference of company. A referral was called to [facility] [phone]. A liaison will be at the hospital this afternoon to meet with the daughter, Raysa Kilgore, to sign consents. The plan is to discharge home today.      Alexandra Lopez RN  Case Management  [phone]

## 2021-10-05 NOTE — CARE COORDINATION
Met with pt and his daughter at the bedside in the emergency department to complete assessment. The pt  lives at home with his daughter, Darius Domingo, who is a nurse and is his primary care giver. She has all needed DME available in the home. He has been active with [facility] for PT/OT/. However, today she is interested in electing hospice care at discharge instead of ROS with HHC. She was provided a list of [facility] hospices from which to choose a company. She would like to discuss the decision with her sister before making a commitment. Darius Domingo wanted to change pt's code status to [facility]. An [place] DNR form was completed and signed by Dr. Kalpana Chan in the ED. The original form was returned to Darius Domingo and copies were placed on the paper chart.     Angela Bedoya RN  Case Management  [phone]

## 2021-10-05 NOTE — ED PROVIDER NOTES
[facility] RESIDENT NOTE       Date of evaluation: 10/5/2021    Chief Complaint     Cerebrovascular Accident (From [place] ED for perfusion scan. Has M1 lesion on CTA. Last known well 2230 last evening. Woke with right side flaccid. Hx of dementia)      of Present Illness     Cristian Pfeiffer is a 81 y.o. male who presents for stroke as a transfer from [place] ED. The patient presented to [place] ED with signs and symptoms concerning for acute ischemic stroke. Upon the patient's arrival, a CT head was performed and was negative for hemorrhage. CTA head/neck was performed with M1 occlusion. Based on last known normal time of 22:30, the patient was outside the window for TPA. He was transferred here for CT perfusion scan and possible intervention. He has a history of dementia and previous CVA with left-sided symptoms. History unobtainable from patient as he is nonverbal. On arrival he is GCS 7, E(1) V(1) M(5) and localizes with the left upper extremity. Withdraws to pain all over lower extremities. On re-examination he moves his left lower extremity spontaneously. The rest of his exam is unchanged and he remains unresponsive without eye opening or verbal response. Does not follow commands. Review of Systems     Review of Systems   Unable to perform ROS: Patient nonverbal       Past Medical, Surgical, Family, and Social History     He has a past medical history of Dementia ([address] HTN (hypertension), Hyperlipidemia, and PONV (postoperative nausea and vomiting). He has a past surgical history that includes Knee arthroscopy; Colonoscopy; eye surgery (Right, 11/2014); Cystoscopy (04/04/2018); and TURP (04/26/2018). His family history is not on file. He reports that he has never smoked. He has never used smokeless tobacco. He reports current alcohol use. He reports that he does not use drugs.     Medications     Discharge Medication List as of 10/5/2021  8:40 PM      CONTINUE these medications which have NOT CHANGED    Details   aspirin 81 MG chewable tablet Take 1 tablet by mouth daily, Disp-30 tablet, R-3Normal      escitalopram (LEXAPRO) 5 MG tablet Take 1 tablet by mouth daily, Disp-30 tablet, R-3Normal      atorvastatin (LIPITOR) 40 MG tablet Take 1 tablet by mouth nightly, Disp-30 tablet, R-3Normal      lisinopril (PRINIVIL;ZESTRIL) 10 MG tablet Take 1 tablet by mouth 2 times daily, Disp-60 tablet, R-3Normal      atenolol (TENORMIN) 25 MG tablet Take 1 tablet by mouth daily, Disp-30 tablet, R-3Normal      finasteride (PROSCAR) 5 MG tablet Take 1 tablet by mouth daily, Disp-30 tablet, R-3Normal      tamsulosin (FLOMAX) 0.4 MG capsule Take 1 capsule by mouth daily, Disp-30 capsule, R-3Normal             Allergies     He has No Known Allergies. Physical Exam     INITIAL VITALS: BP: (!) 162/81, Temp: 97.6 °F (36.4 °C), Pulse: 111, Resp: 24, SpO2: 94 %   Physical Exam    Constitutional:  81 y.o. male, well nourished; well developed; GCS 7, no eye-opening with sternal rub, no verbal response, localizes to pain with upper extremity. HEENT:  Normocephalic, atraumatic. Mucous membranes are moist.  Eyes: Anicteric, PERRL 2mm to 1 mm, and brisk bilaterally. Neck:  Supple, Full ROM, trachea midline  Pulmonary:   Lateral questions, symmetric chest rise. Cardiac: Tachycardia, A. fib RVR on monitor  Abdomen:  Soft, non-tender, non-distended. Extremities: 2+ radial pulses. No extremity deformity, swelling or tenderness appreciated. Skin:  No rashes or bruising. Neuro: GCS 7, no eye-opening. Nonverbal.  Localizes to pain with left upper extremity. Moves right lower extremity spontaneously. Withdraws to pain right side. Psych:  Mood and affect unable to assess. DiagnosticResults     EKG   Interpreted in conjunction with emergencydepartment physician Red Sanders MD  EKG from [place] ED reviewed. Atrial fibrillation with RVR.     RADIOLOGY:  CT Brain Perfusion   Final Result      Hypoperfusion in the left MCA territory no central ischemic core, total volume of hypoperfusion of 99 mL, and penumbra of 99 mL. This is compatible with previously demonstrated left M1 occlusion. LABS:   No results found for this visit on 10/05/21. ED BEDSIDE ULTRASOUND:  None    RECENT VITALS:  BP: (!) 147/103, Temp: 97.6 °F (36.4 °C), Pulse: 102,Resp: 27, SpO2: 95 %     Procedures     None    ED Course     Nursing Notes, Past Medical Hx, Past Surgical Hx, Social Hx, Allergies, and Family Hx were reviewed. The patient was given the followingmedications:  Orders Placed This Encounter   Medications    iopamidol (ISOVUE-370) 76 % injection 80 mL    DISCONTD: dilTIAZem 125 mg in dextrose 5 % 125 mL infusion    DISCONTD: HYDROmorphone (DILAUDID) injection 1 mg    DISCONTD: aspirin suppository 300 mg    LORazepam (ATIVAN) 2 MG/ML injection     Sig: Infuse 0.5 mLs intravenously every 4 hours for 4 days. Dispense:  12 mL     Refill:  0    Hyoscyamine Sulfate SL (LEVSIN/SL) 0.125 MG SUBL     Sig: Place 2 tablets under the tongue every 4 hours as needed (for secretions)     Dispense:  120 each     Refill:  0    morphine 4 MG/ML injection     Sig: Infuse 1 mL intravenously every hour as needed for Pain for up to 7 days. Dispense:  30 mL     Refill:  0       CONSULTS:  IP CONSULT TO HOSPITALIST  IP CONSULT TO NEUROLOGY  IP CONSULT TO PALLIATIVE CARE  IP CONSULT TO HOSPICE  IP CONSULT TO [facility] / ASSESSMENT / Geryl Marylu is a 81 y.o. male with history of dementia and previous left-sided CVA, who presents with new CVA, and M1 occlusion. This patient was also evaluated by the attending physician. All care plans were discussed and agreed upon. CT perfusion scan performed on arrival. Neurosurgery was consulted and the images.   Based on the patient's baseline functional status, he is not a candidate for additional interventions. He will be admitted for acute stroke care neurology was consulted. He arrived with atrial fibrillation with RVR. He was on adult drip on arrival.  We have continued this. Stable tachycardia 110s to 120. No hypotension. I discussed admission with the hospitalist, however the patient's daughter Carlita elected to change code status to DNR. After the admitting medicine team, came to bedside evaluated the patient and spoke with his daughter, they had a palliative care discussion and with shared decision making regarding general prognosis, his daughter Carlita has elected to pursue hospice care. I spoke with our care coordinator Liset to assist with arrangements. He is clearly a candidate for hospice care and this can likely be arranged today. He will discharge from the ED with hospice, as soon as this can be set up in their home. Clinical Impression     1. Ischemic stroke ([facility] [address]        Disposition     PATIENT REFERRED TO:  No follow-up provider specified. DISCHARGE MEDICATIONS:  Discharge Medication List as of 10/5/2021  8:40 PM      START taking these medications    Details   LORazepam (ATIVAN) 2 MG/ML injection Infuse 0.5 mLs intravenously every 4 hours for 4 days. , Disp-12 mL, R-0Print      Hyoscyamine Sulfate SL (LEVSIN/SL) 0.125 MG SUBL Place 2 tablets under the tongue every 4 hours as needed (for secretions), Disp-120 each, R-0Print      morphine 4 MG/ML injection Infuse 1 mL intravenously every hour as needed for Pain for up to 7 days. , Disp-30 mL, R-0Print             DISPOSITION Decision To Discharge 10/05/2021 08:25:59 Yanira Luis MD  Resident  10/05/21 [address], MD  Resident  10/06/21 3670

## 2021-10-05 NOTE — ED PROVIDER NOTES
Emergency Department Encounter    Patient: Leonie Scott  MRN: [id number]  : 1935  Date of Evaluation: 10/6/2021  ED Provider:  Perfecto Foreman MD    Triage Chief Complaint:   Altered Mental Status (Pt arrived via [facility] ems from home, daughter called ems for R sided facial droop. Hx of L sided stroke with preexisting left sided weakness. Hx of dememtia. LKW 10:30 PM. )    [place]:  Leonie Scott is a 81 y.o. male that presents to the ER for evaluation of acute severe right arm right leg hemiplegia dense with asked aggressive and receptive aphasia mode of arrival is EMS, onset last normal known was 20-35 on 10/4/2021 it is now 08 3410 2021. He has a prior history of embolic stroke with residual left arm and leg deficits. On arrival patient is tachycardic tachypneic with accelerated hypertension.   Moderate distress on arrival    ROS - see HPI, below listed is current ROS at time of my eval:  Able to be obtained due to nonverbal status    Past Medical History:   Diagnosis Date    Dementia ([address]     HTN (hypertension)     Hyperlipidemia     PONV (postoperative nausea and vomiting)      Past Surgical History:   Procedure Laterality Date    COLONOSCOPY      CYSTOSCOPY  2018    FLEXIBLE CYSTOSCOPY    EYE SURGERY Right 2014    Cataract removal    KNEE ARTHROSCOPY      TURP  2018     Family History   Problem Relation Age of Onset    Heart Disease Neg Hx     High Blood Pressure Neg Hx     High Cholesterol Neg Hx      Social History     Socioeconomic History    Marital status:      Spouse name: Not on file    Number of children: Not on file    Years of education: Not on file    Highest education level: Not on file   Occupational History    Not on file   Tobacco Use    Smoking status: Never Smoker    Smokeless tobacco: Never Used   Vaping Use    Vaping Use: Never used   Substance and Sexual Activity    Alcohol use: Yes     Comment: occasional glass of wine    Drug use: No    Sexual activity: Not on file   Other Topics Concern    Not on file   Social History Narrative    Not on file     Social Determinants of Health     Financial Resource Strain:     Difficulty of Paying Living Expenses:    Food Insecurity:     Worried About Running Out of Food in the Last Year:     [address] in the Last Year:    Transportation Needs:     Lack of Transportation (Medical):  Lack of Transportation (Non-Medical):    Physical Activity:     Days of Exercise per Week:     Minutes of Exercise per Session:    Stress:     Feeling of Stress :    Social Connections:     Frequency of Communication with Friends and Family:     Frequency of Social Gatherings with Friends and Family:     Attends [employer] Services:     Active Member of Clubs or Organizations:     Attends Club or Organization Meetings:     Marital Status:    Intimate Partner Violence:     Fear of Current or Ex-Partner:     Emotionally Abused:     Physically Abused:     Sexually Abused:      No current facility-administered medications for this encounter. Current Outpatient Medications   Medication Sig Dispense Refill    LORazepam (ATIVAN) 2 MG/ML injection Infuse 0.5 mLs intravenously every 4 hours for 4 days. 12 mL 0    Hyoscyamine Sulfate SL (LEVSIN/SL) 0.125 MG SUBL Place 2 tablets under the tongue every 4 hours as needed (for secretions) 120 each 0    morphine 4 MG/ML injection Infuse 1 mL intravenously every hour as needed for Pain for up to 7 days.  30 mL 0    aspirin 81 MG chewable tablet Take 1 tablet by mouth daily 30 tablet 3    escitalopram (LEXAPRO) 5 MG tablet Take 1 tablet by mouth daily 30 tablet 3    atorvastatin (LIPITOR) 40 MG tablet Take 1 tablet by mouth nightly 30 tablet 3    lisinopril (PRINIVIL;ZESTRIL) 10 MG tablet Take 1 tablet by mouth 2 times daily 60 tablet 3    atenolol (TENORMIN) 25 MG tablet Take 1 tablet by mouth daily 30 tablet 3    finasteride (PROSCAR) 5 MG tablet Take 1 tablet by mouth daily 30 tablet 3    tamsulosin (FLOMAX) 0.4 MG capsule Take 1 capsule by mouth daily 30 capsule 3     No Known Allergies    Nursing Notes Reviewed    Physical Exam:  Triage VS:    ED Triage Vitals [10/05/21 0829]   Enc Vitals Group      BP (!) 157/114      Pulse 126      Resp 28      Temp 98.5 °F (36.9 °C)      Temp Source Oral      SpO2 98 %      Weight       Height       Head Circumference       Peak Flow       Pain Score       Pain Loc       Pain Edu? Excl. in [address]? My pulse ox interpretation is - normal    General appearance:  + acute distress. Skin:  Warm. Dry. No rash. Eye:  Extraocular movements intact. Ears, nose, mouth and throat:  Oral mucosa moist   Neck:  Trachea midline. Extremity:  No swelling. Normal ROM     Heart:  Regular rate and rhythm, normal S1 & S2, no extra heart sounds. Perfusion:  intact   Respiratory:  Lungs clear to auscultation bilaterally. Respirations nonlabored. Abdominal:  Normal bowel sounds. Soft. Nontender. Non distended. Neurological:      Mental Status Exam:   Nonverbal and gaze preference to the left    Cranial Tshbyf-KI-ACU Intact.     Cranial nerve II  Visual acuity: normal  Cranial nerve III  Pupils: equal, round, reactive to light  Cranial nerves III, IV, VI  Extraocular Movements: intact  Cranial nerve V  Facial sensation: intact  Cranial nerve VII  Facial strength: intact  Cranial nerve VIII  Hearing: intact  Cranial nerve IX  Palate: intact  Cranial nerve XI  Shoulder shrug: intact  Cranial nerve XII  Tongue movement: normal    Motor:   Drift: absent  Motor exam is dense hemiplegia right arm and right leg diminished strength left arm and left leg chronic  Tone: normal  Abnormal Movements: Absent    DTRs- intact bilaterally and symmetrical.  Toes-downgoing bilaterally  Sensory:  Light Touch  Right Upper Extremity: Decreased  Left Upper Extremity: Decreased  Right Lower Extremity: normal  Left Lower Extremity: normal      I have reviewed and interpreted all of the currently available lab results from this visit (if applicable):  Results for orders placed or performed during the hospital encounter of 10/05/21   CBC Auto Differential   Result Value Ref Range    WBC 10.8 4.0 - 11.0 K/uL    RBC 4.42 4.20 - 5.90 M/uL    Hemoglobin 11.9 (L) 13.5 - 17.5 g/dL    Hematocrit 36.1 (L) 40.5 - 52.5 %    MCV 81.7 80.0 - 100.0 fL    MCH 27.0 26.0 - 34.0 pg    MCHC 33.0 31.0 - 36.0 g/dL    RDW 14.7 12.4 - 15.4 %    Platelets 520 945 - 549 K/uL    MPV 8.3 5.0 - 10.5 fL    Neutrophils % 73.2 %    Lymphocytes % 12.0 %    Monocytes % 12.9 %    Eosinophils % 0.7 %    Basophils % 1.2 %    Neutrophils Absolute 7.9 (H) 1.7 - 7.7 K/uL    Lymphocytes Absolute 1.3 1.0 - 5.1 K/uL    Monocytes Absolute 1.4 (H) 0.0 - 1.3 K/uL    Eosinophils Absolute 0.1 0.0 - 0.6 K/uL    Basophils Absolute 0.1 0.0 - 0.2 K/uL   Basic Metabolic Panel w/ Reflex to MG   Result Value Ref Range    Sodium 140 136 - 145 mmol/L    Potassium reflex Magnesium 4.0 3.5 - 5.1 mmol/L    Chloride 105 99 - 110 mmol/L    CO2 22 21 - 32 mmol/L    Anion Gap 13 3 - 16    Glucose 105 (H) 70 - 99 mg/dL    BUN 16 7 - 20 mg/dL    CREATININE 0.8 0.8 - 1.3 mg/dL    GFR Non-African American >60 >60    GFR African American >60 >60    Calcium 9.0 8.3 - 10.6 mg/dL   Protime-INR   Result Value Ref Range    Protime 15.0 (H) 9.9 - 12.7 sec    INR 1.31 (H) 0.88 - 1.12   APTT   Result Value Ref Range    aPTT 28.4 26.2 - 38.6 sec   Hepatic function panel   Result Value Ref Range    Total Protein 6.8 6.4 - 8.2 g/dL    Albumin 3.2 (L) 3.4 - 5.0 g/dL    Alkaline Phosphatase 84 40 - 129 U/L    ALT 8 (L) 10 - 40 U/L    AST 13 (L) 15 - 37 U/L    Total Bilirubin 0.8 0.0 - 1.0 mg/dL    Bilirubin, Direct 0.3 0.0 - 0.3 mg/dL    Bilirubin, Indirect 0.5 0.0 - 1.0 mg/dL   Lactate, Sepsis   Result Value Ref Range    Lactic Acid, Sepsis 1.3 0.4 - 1.9 mmol/L   Lactate, Sepsis   Result Value Ref Range    Lactic Acid, ABO/Rh A POS     Antibody Screen NEG       Radiographs (if obtained):  Radiologist's Report Reviewed:  Old infarction in the right frontal periventricular white matter. The  gray-white differentiation is grossly maintained. On the concurrent CTA,  there is occlusion of mid M1 segment of the left MCA. There is likely acute  ischemia in the left MCA territory.     Mild parenchymal volume loss.     Mild chronic microvascular disease. EKG (if obtained): (All EKG's are interpreted by myself in the absence of a cardiologist)  Atrial fibrillation with rapid ventricular rate  Stroke alert timing details    1. Physician evaluation performed at: 08:30  2. Stroke alert called at:  08:36  3. CT results obtained at: Imaging reviewed and interpreted by stroke team at 09 16  4. Decision to administer tPA at: not candidtae  5. Patient/family or educated on patient's workup, results, and the decision by neurology in conjunction with emergency physician to recommend that the patient received tPA, family/patient were given TPA information/education sheet, which they reviewed, risks benefits and alternatives were discussed with the patient/family, they understand and are willing to assume all the risks, and did consent to receiving the medication      t-PA NOT given due to the following EXCLUSION CRITERIA (only those listed):  Pregnancy  Symptoms > 3 hours of onset      MDM:  09 16, the patient was found to have an M1 left occlusion, were unable to perform CT perfusion here. He is in atrial fibrillation with rapid ventricular rate with a pulse of 126 and a /114. Diltiazem bolus was initiated diltiazem infusion. #1: Acute embolic stroke. Left MCA stroke M1 lesion acute, we do not have the capability CT perfusion scan to evaluate for clot extraction, he is not a candidate for systemic TPA due to time interval well over 4.5 hours, he last was seen normal at 2230.   Chart review from care everywhere revealed his prior right-sided stroke with left deficits from June and July 2021. I do not see anticoagulation listed mildly see antiplatelet agents listed. Consultation with stroke team was performed . Identification of an M1 acute thrombus was was noted the patient will be transferred to [facility]. for CT perfusion scan to evaluate for possible thrombectomy. Daughter who is power of  was verbalized an understanding disposition management gives consent. 2: Atrial fibrillation with rapid ventricular rate. Patient received diltiazem bolus and diltiazem infusion. Rate control. Total critical care time provided today was 65 minutes. This excludes seperately billable procedures and family discussion time. Critical care time provided for obtaining history, conducting a physical exam, performing and monitoring interventions, ordering, collecting and interpreting tests, and establishing medical decision-making. There was a potential for life/limb threatening pathology requiring close evaluation and intervention with concern for patient decompensation. Clinical Impression:  1. Cerebrovascular accident (CVA) due to thrombosis of left middle cerebral artery ([address]    2. Atrial fibrillation with rapid ventricular response ([address]    3. History of cerebrovascular accident (CVA) with residual deficit left side      Disposition referral (if applicable):  No follow-up provider specified. Disposition medications (if applicable):  Discharge Medication List as of 10/5/2021 10:46 AM        ED Provider Disposition Time  DISPOSITION        Comment: Please note this report has been produced using speech recognition software and may contain errors related to that system including errors in grammar, punctuation, and spelling, as well as words and phrases that may be inappropriate. Efforts were made to edit the dictations.      Kg Bergeron MD  04/25/90 [address] [place] [address] MD Dirk  60/47/93 [id number]

## 2021-10-05 NOTE — CONSULTS
Neurology Consult Note  Reason for Consult: stroke admission  Chief complaint: stroke  Dr Artie Lim MD asked me to see Severo Amato in consultation for evaluation of stroke    History of Present Illness:  Severo Amato is a 81 y.o. male with a history of atrial fibrillation (on asa), dementia, hypertension, and hyperlipidemia who presents with acute onset of right hemiparesis and aphasia which is severe. His daughter reports he was last known well at 10:30 PM 10/4. She found him in bed at 7:30 AM with a left MCA syndrome. She tells me he had a stroke in June 2021 - which left him with left hemiparesis. He was never started on anticoagulation, and had been taking asa. He went to rehab and has been home since late July. He is able to get around his house with some difficulty and has perceived poor quality of life per his daughter who is a home health nurse. She wishes to make him a DNR. EVT was not pursued due to baseline mRS. He was admitted to [facility] from 6/19-6/23. He was placed on DAPT. BERNARD was negative for source of stroke. He had an event monitor ordered. He was found to be in afib this admission. Medical History:  Past Medical History:   Diagnosis Date    Dementia ([address]     HTN (hypertension)     Hyperlipidemia     PONV (postoperative nausea and vomiting)      Past Surgical History:   Procedure Laterality Date    COLONOSCOPY      CYSTOSCOPY  04/04/2018    FLEXIBLE CYSTOSCOPY    EYE SURGERY Right 11/2014    Cataract removal    KNEE ARTHROSCOPY      TURP  04/26/2018     Not in a hospital admission.   No Known Allergies  Family History   Problem Relation Age of Onset    Heart Disease Neg Hx     High Blood Pressure Neg Hx     High Cholesterol Neg Hx      Social History     Tobacco Use   Smoking Status Never Smoker   Smokeless Tobacco Never Used     Social History     Substance and Sexual Activity   Drug Use No     Social History     Substance and Sexual Activity   Alcohol Use Yes    Comment: occasional glass of wine     ROS:  JORGE ALBERTO given aphasia    Exam:  Blood pressure (!) 162/81, pulse 111, temperature 97.6 °F (36.4 °C), resp. rate 24, height 6' 2\" (1.88 m), weight 211 lb 10.3 oz (96 kg), SpO2 94 %. Constitutional    Vital signs: BP, HR, and RR reviewed   General Alert, no distress, well-nourished  Eyes: fundoscopic exam revealed no hemorrhage   Cardiovascular: pulses symmetric in all 4 extremities. No peripheral edema. Psychiatric: unable to assess given comatose state  Neurologic  Mental status: eyes do not open to pain  orientation unable to assess given aphasia  General fund of knowledge unable to assess given aphasia   Memory unable to assess given aphasia  Attention unable to assess given aphasia   Language aphasic  Comprehension not following any commands  CN2: Visual Fields: forced lid closure  CN 3,4,6: eyes driving to the left, unable to assess pupillary response given forced lid closure   CN5: facial sensation unable to assess given aphasia  CN7: right facial weakness  CN8: hearing unable to full assess   CN9: unable to assess given aphasia  CN11: unable to assess given aphasia   CN12: unable to assess given aphasia   Strength: minimal movement in all four limbs but brisker response to pain on the left   Deep tendon reflexes: normal and symmetric in all 4 extremities  Sensory: grimace and moans to pain in all 4 extremities. Withdraws from pain in LUE, LLE, and RLE. No withdrawal from pain in RUE  Cerebellar/coordination: unable to assess given aphasia  Tone: reduced on the right; normal to increased on the left   Gait: deferred for safety given weakness    Labs  A1c 5.3% (June 2021)  LDL 88 mg/dL (June 2021)    Studies  CT-A head and neck 10/5/21 9 AM  Occlusion in the mid M1 segment of the left MCA with occlusion of associated   branches. There are patent branches in the distal left MCA territory.       Foci of severe stenosis along the A2 segment of the right NITA.      Moderate stenosis in the mid V4 segment of the left vertebral artery.       Severe stenosis in the mid V4 segment of the right vertebral artery.       Moderate to severe stenosis in the right posterior communicating artery.       No acute abnormality or flow-limiting stenosis in the major arteries of the   neck.       Mild airspace opacity at the left lung apex, likely related to pneumonia. Echo June 2021: EF 60-65%; LA normal in size. EKG: atrial fibrillation    Impression:  Leonie Scott is a 81 y.o. male with newly diagnosed atrial fibrillation, not on anticoagulation, who presents with left MCA syndrome found to have left M1 occlusion, not deemed suitable for thrombectomy given poor baseline function. His daughter (a home health nurse) tells me he would not want to be resuscitated or intubated, and most likely would not want a PEG. Recommendations:  - NPO until able to assess swallowing - SLP consult. Do not suspect he will be able to swallow effectively. - Palliative care consultation given probable transition to comfort care   - [place] asa until taking PO  - SCDs for DVT prophylaxis  - HOB elevated to help prevent aspiration  - Q4H Neurologic Exams & Vitals  - Telemetry given atrial fibrillation   - PT/OT: eval and treat when able  - Stroke Education at Discharge  - Follow up w/ Neurology in 3 months     A copy of this note was provided for Dr Selam Houser MD.     Erma Feliz NP  Neurology and Neurocritical care  [facility] Neurology line: [phone]  [employer]: [facility] Neurology & Neurocritical Care NPs    I spent 45 minutes in the care of this patient. Over 50% of that time was in face-to-face counseling regarding disease process, diagnostic testing, preventative measures, and answering patient and family questions.

## 2021-10-05 NOTE — ED PROVIDER NOTES
ED Attending Attestation Note     Date of evaluation: 10/5/2021    This patient was seen by the resident. I have seen and examined the patient, agree with the workup, evaluation, management and diagnosis. The care plan has been discussed. My assessment reveal an 45-year-old male who was transferred from outside hospital for concerns for a left M1 occlusion which was potentially acute.   On initial examination for me the patient was sleepy but arousable to painful stimuli and was able to localize with the left upper extremity but only had some flickering motions in response to pain on the right upper and lower extremity     Donavon Perez MD  10/05/21 121

## 2021-10-05 NOTE — CONSULTS
D/w Dr Mehreen Pressley and CM RN Yani Nesbitt. Family has opted for comfort care/hospice. Goals are established. Palliative care will sign off, please call for changes/concerns.      [address] [facility]  Inpatient Palliative Care  [phone]

## 2021-10-05 NOTE — ED TRIAGE NOTES
From [place] ED for perfusion scan. Has M1 lesion on CTA. Last known well 2230 last evening. Woke with right side flaccid.  Hx of dementia

## 2021-10-05 NOTE — ED NOTES
Bed: 20  Expected date:   Expected time:   Means of arrival: [facility]  Comments:  Medic 31/ Facial droop     [address]  10/05/21 0433

## 2021-10-09 LAB
BLOOD CULTURE, ROUTINE: NORMAL
CULTURE, BLOOD 2: NORMAL
